# Patient Record
Sex: MALE | Race: BLACK OR AFRICAN AMERICAN | Employment: FULL TIME | URBAN - METROPOLITAN AREA
[De-identification: names, ages, dates, MRNs, and addresses within clinical notes are randomized per-mention and may not be internally consistent; named-entity substitution may affect disease eponyms.]

---

## 2019-08-05 ENCOUNTER — HOSPITAL ENCOUNTER (OUTPATIENT)
Dept: ULTRASOUND IMAGING | Age: 64
Discharge: HOME | End: 2019-08-05
Attending: FAMILY MEDICINE
Payer: COMMERCIAL

## 2019-08-05 DIAGNOSIS — R59.1 LYMPHADENOPATHY: ICD-10-CM

## 2019-08-05 PROCEDURE — 76536 US EXAM OF HEAD AND NECK: CPT

## 2019-08-28 ENCOUNTER — HOSPITAL ENCOUNTER (OUTPATIENT)
Dept: CT IMAGING | Age: 64
Discharge: HOME | End: 2019-08-28
Attending: FAMILY MEDICINE
Payer: COMMERCIAL

## 2019-08-28 DIAGNOSIS — R59.1 LYMPHADENOPATHY: ICD-10-CM

## 2019-08-28 PROCEDURE — 70491 CT SOFT TISSUE NECK W/DYE: CPT

## 2019-08-28 PROCEDURE — 74011250636 HC RX REV CODE- 250/636: Performed by: RADIOLOGY

## 2019-08-28 PROCEDURE — 74011636320 HC RX REV CODE- 636/320: Performed by: RADIOLOGY

## 2019-08-28 RX ORDER — SODIUM CHLORIDE 9 MG/ML
10 INJECTION INTRAMUSCULAR; INTRAVENOUS; SUBCUTANEOUS ONCE
Status: COMPLETED | OUTPATIENT
Start: 2019-08-28 | End: 2019-08-28

## 2019-08-28 RX ORDER — SODIUM CHLORIDE 9 MG/ML
50 INJECTION, SOLUTION INTRAVENOUS
Status: COMPLETED | OUTPATIENT
Start: 2019-08-28 | End: 2019-08-28

## 2019-08-28 RX ADMIN — SODIUM CHLORIDE 10 ML: 9 INJECTION INTRAMUSCULAR; INTRAVENOUS; SUBCUTANEOUS at 19:38

## 2019-08-28 RX ADMIN — SODIUM CHLORIDE 50 ML/HR: 900 INJECTION, SOLUTION INTRAVENOUS at 19:37

## 2019-08-28 RX ADMIN — IOPAMIDOL 100 ML: 612 INJECTION, SOLUTION INTRAVENOUS at 19:38

## 2019-09-10 ENCOUNTER — HOSPITAL ENCOUNTER (OUTPATIENT)
Dept: LAB | Age: 64
Discharge: HOME | End: 2019-09-10

## 2019-09-25 ENCOUNTER — DOCUMENTATION ONLY (OUTPATIENT)
Dept: ONCOLOGY | Age: 64
End: 2019-09-25

## 2019-09-25 ENCOUNTER — OFFICE VISIT (OUTPATIENT)
Dept: ONCOLOGY | Age: 64
End: 2019-09-25

## 2019-09-25 VITALS
WEIGHT: 155.6 LBS | HEIGHT: 69 IN | OXYGEN SATURATION: 99 % | DIASTOLIC BLOOD PRESSURE: 75 MMHG | HEART RATE: 81 BPM | SYSTOLIC BLOOD PRESSURE: 115 MMHG | BODY MASS INDEX: 23.05 KG/M2 | TEMPERATURE: 98.3 F

## 2019-09-25 DIAGNOSIS — C09.9 TONSIL CANCER (HCC): Primary | ICD-10-CM

## 2019-09-25 DIAGNOSIS — F17.200 SMOKING: ICD-10-CM

## 2019-09-25 RX ORDER — HYDROCHLOROTHIAZIDE 12.5 MG/1
12.5 TABLET ORAL DAILY
COMMUNITY

## 2019-09-25 RX ORDER — ATORVASTATIN CALCIUM 20 MG/1
TABLET, FILM COATED ORAL DAILY
COMMUNITY

## 2019-09-25 RX ORDER — OMEPRAZOLE 20 MG/1
20 CAPSULE, DELAYED RELEASE ORAL DAILY
COMMUNITY
End: 2019-12-04

## 2019-09-25 RX ORDER — BISMUTH SUBSALICYLATE 262 MG
1 TABLET,CHEWABLE ORAL DAILY
COMMUNITY

## 2019-09-25 NOTE — PROGRESS NOTES
[facility] [place] at [place] [address] [address], [address]: [phone]  F: [phone]    Reason for Visit:   Escobar Perez is a 60 y.o. male who is seen in consultation at the request of Dr. 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 Size is measuring up to 2.5 cm in short axis. Right internal jugular vein is nearly fully compressed, no contralateral adenopathy seen. · 2019-ENT exam showed very inflamed tonsil on the right side with right Epley and surface, reportedly invading the pharyngeal wall-biopsy of the right tonsil showed invasive nonkeratinizing squamous cell carcinoma moderately differentiated      History of Present Illness:   Patient is a 60 y.o. male seen for SCC of the R tonsil    3 months ago he noted a small R neck swelling. Saw Dr. Carlie Vogt. Tried antibiotics for 10 days. Returned 19 and had an USG that showed adenopathy and then referred to Dr. Deborah Marion. This led to above mentioned diagnosis. He has no fevers, chills, sweats, no dysphagia, has no bleeding, discharge, he has R neck pain and ear pain. He has no hearing impairment. He has no neuropathy. He has SOB. He has no rashes. No weight changes     He walks 5-6 miles a day without SOB  He quit smoking 10 years ago, had smoked 1 ppd for 28 years    Sister had stage IV endometrial cancer in her 46s  Another sister  of ovarian cancer in her 62s     [address] [facility] and [address] [facility] reviewed above       Reviewed under HPI      Reviewed under HPI       Current Outpatient Medications   Medication Sig    hydroCHLOROthiazide (HYDRODIURIL) 12.5 mg tablet Take 12.5 mg by mouth daily.  omeprazole (PRILOSEC) 20 mg capsule Take 20 mg by mouth daily.  atorvastatin (LIPITOR) 20 mg tablet Take  by mouth daily.  multivitamin (ONE A DAY) tablet Take 1 Tab by mouth daily. No current facility-administered medications for this visit. Allergies not on file     Review of Systems: A complete review of systems was obtained, negative except as described above. Physical Exam:     Visit Vitals  /75   Pulse 81   Temp 98.3 °F (36.8 °C)   Ht 5' 9\" (1.753 m)   Wt 155 lb 9.6 oz (70.6 kg)   SpO2 99%   BMI 22.98 kg/m²     ECOG PS: 1  General: No distress  Eyes: PERRLA, anicteric sclerae  HENT: R tonsil enlarged with irregular contour, with erythema, exudates, cant appreciate the posterior pharyngeal wall., palpable Left cervical nodes  Neck: Supple  Lymphatic: No cervical, supraclavicular, or inguinal adenopathy  Respiratory: CTAB, normal respiratory effort  CV: Normal rate, regular rhythm, no murmurs, no peripheral edema  GI: Soft, nontender, nondistended, no masses, no hepatomegaly, no splenomegaly  MS: Normal gait and station. Digits without clubbing or cyanosis. Skin: No rashes, ecchymoses, or petechiae. Normal temperature, turgor, and texture. Psych: Alert, oriented, appropriate affect, normal judgment/insight    Results:   No results found for: WBC, HGB, HCT, PLT, MCV, ANEU, HGBPOC, HCTPOC, HGBEXT, HCTEXT, PLTEXT  No results found for: NA, K, CL, CO2, GLU, BUN, CREA, GFRAA, GFRNA, CA, NAPOC, KPOCT, CLPOC, GLUCPOC, IBUN, CREAPOC, ICAI  No results found for: TBILI, ALT, SGOT, AP, TP, ALB, GLOB  Records reviewed and summarized above. Pathology report(s) reviewed above. Radiology report(s) reviewed above.       Assessment:   1) R tonsil SCC    CT neck and ENT exam reports reviewed  Per discussion with Dr. Devora Rich the tonsillar mass may be involving the pharyngeal wall  CT suggests multiple ipsilateral nodes , atleast one ~ cm and none > 6 cm    Hence clinically at least T3N1- though PET is necessary to accurrately stage as is HPV status  He does have a h/o smoking for 35 years    If HPV positive atleast stage II  If HPV negative atleast stage III    Surgery not recommended by ENT due to disease extent and likelihood of poor functional outcomes    Regardless of HPV status he is likely to need definitive ChemoRT  He has an otherwise excellent PS       [facility] meta analysis: confirmed that regimens that included both chemotherapy and RT significantly decreased the risk of death in 5872 patients with oropharyngeal carcinoma compared with RT alone (hazard ratio [HR] 0.88, 95% CI 0.82-0.93). This corresponded to an absolute improvement of 5 percent in overall survival at five years and higher for concurrent chemoradiotherapy. Standard of care in those with excellent health is Cis 100 mg/m2 Q 3 weeks for 2-3 cycles and 70 Gy in 35 fractions. We discussed the chemotherapy regimen, it's logistics, and potential toxicities in detail. Potential side effects include, but are not limited to, nausea, vomiting, diarrhea, taste changes, myelosuppression, infection, fatigue, allergic reactions, rash, edema, neuropathy, and rarely, death. The patient asked several well thought out questions which I answered to the best of my ability and to their apparent satisfaction. The patient has given consent for chemotherapy. He meets with Dr. Mariah Roe Friday and I will see him right after his PET to finalize plan  He understands the need for port and PEG placement    2) H/O Smoking  Quit  PET    3)  Fh OF Cancers  Should consider genetic counseling    4) Psychosocial  Coping well  Met with SW    Plan:     · PET CT Monday  · See Dr. Mariah Roe as scheduled  · Review in tumor board  · P16 added to path  · RTC Tuesday Oct 1    > 50% of this 60 min encounter spent in counseling and care co ordination    I appreciate the opportunity to participate in Susanna Antonietta Sutton forrest.     Signed By: Iam Saxena MD

## 2019-09-25 NOTE — PROGRESS NOTES
This note will not be viewable in [address] Oncology Navigator  Psychosocial Assessment    Reason for Assessment:    []Depression  []Anxiety  []Caregiver [place]  []Maladaptive Coping with Serious Illness   [] Social Work Referral [x] Initial Assessment  [] Other     Sources of Information:    [x]Patient  [x]Family  []Staff  []Medical Record    Advance Care Planning:  No flowsheet data found. Patient does not have an advanced medical directive, and did not express interest in completing one today.     Mental Status:    [x]Alert  []Lethargic  []Unresponsive   [] Unable to assess   Oriented to:  [x]Person  [x]Place  [x]Time  [x]Situation      Barriers to Learning:    []Language  []Developmental  []Cognitive  []Altered Mental Status  []Visual/Hearing Impairment  []Unable to Read/Write  []Motivational   [x]No Barriers Identified  []Other:    Relationship Status:  []Single  [x]  []Significant Other/Life Partner  []  []  []      Living Circumstances:  []Lives Alone  [x]Family/Significant Other in Household  []Roommates  []Children in the Home  []Paid Caregivers  []Assisted Living Facility/Group Home  []Skilled [address]  []Homeless  []Incarcerated  []Environmental/Care Concerns  []other:    Employment Status:  [x]Employed Full-time []Employed Part-time []Homemaker [] Disabled  [] Retired []Other:    Support System:    [x]Strong  []Fair  []Limited    Financial/Legal Concerns:    []Uninsured  []Limited Income/Resources  []Non-Citizen  [x]No Concerns Identified  []Financial POA:    []Other:    Gnosticist/Spiritual/Existential:  []Strong Sense of Spirituality  []Involved in [employer]  []Request  Visit  []Expressing Cristofer Wallace  [x]No Concerns Identified    Coping with Illness:         Patient: Family/Caregiver:   Understanding and Acceptance of Illness/Prognosis  [x] [x]   Strong Sense of Resilience [x] [x]   Self Reflection [x] [x]   Engaged Support System [x] [x]   Does not Readily Discuss Illness [] []   Denial of Terminal Status [] []   Anger [] []   Depression [] []   Anxiety/Fear [] []   Bargaining [] []   Recent Diagnosis/Prognosis [] []   Difficulties with Body Image [] []   Loss of Identity [] []   Excessive Substance Use [] []   Mental Health History [] []   Enmeshed Relationships [] []   History of Loss [] []   Anticipatory Grief [] []   Concern for Complicated Grief [] []   Suicidal Ideation or Plan [] []   Unable to assess [] []            Narrative:  Met with the patient during his initial office visit along with his siblings, Tova Alvarez, and Hermes Tejeda. 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 He plans to meet with a radiation oncologist, and get a PET scan to help determine a treatment plan soon. The patient lives with his wife. He has 3 grown children who are supportive. The patient is employed at the [employer] in [place], [place]. He also has a \"football team\" of siblings, counting 13. He explained that he is from [facility] [place]. His sister China Myers) lives in [place]. His brother Britt Escobar), present today, is visiting from [place]. Hermes Tejeda has just been diagnosed with a high PSA, and will be seeing an MD tomorrow to determine if he has prostate cancer. Tova  has metastatic breast cancer. They have sister who just  from ovarian cancer. He plans to get genetic testing soon. Discussed [facility] as a back up to family providing transportation. Provided this 's contact information as well as information regarding the complementary services provided by the [facility]. The patient provided FMLA and short term disability paperwork to be completed by our office once a treatment plan is determined; this was given to colleague Liane Elliott RN. Assessment/Action:   1.   Introduced self and role of this  in the [facility]. 2.  Informed the patient of the [employer] and available resources there. 3.  Continue to meet with the patient when he returns to the clinic for ongoing assessment of the patients adjustment to his diagnosis and treatment. 4.  Ongoing psychosocial support as desired by patient.       Plan/Referral:     Transportation referral (Discussed [facility] as a back up to family providing transportation)   Complementary therapies referral  Insurance/Entitlements referral (Patient provided FMLA and short term disability paperwork to be completed by our office once a treatment plan is determined)    Amy Berman LCSW

## 2019-09-27 ENCOUNTER — HOSPITAL ENCOUNTER (OUTPATIENT)
Dept: RADIATION THERAPY | Age: 64
Discharge: HOME | End: 2019-09-27

## 2019-09-30 ENCOUNTER — HOSPITAL ENCOUNTER (OUTPATIENT)
Dept: PET IMAGING | Age: 64
Discharge: HOME | End: 2019-09-30
Attending: OTOLARYNGOLOGY
Payer: COMMERCIAL

## 2019-09-30 VITALS — BODY MASS INDEX: 22.81 KG/M2 | HEIGHT: 69 IN | WEIGHT: 154 LBS

## 2019-09-30 DIAGNOSIS — C09.9 SQUAMOUS CELL CARCINOMA OF TONSILS (HCC): ICD-10-CM

## 2019-09-30 PROCEDURE — A9552 F18 FDG: HCPCS

## 2019-09-30 RX ORDER — SODIUM CHLORIDE 0.9 % (FLUSH) 0.9 %
10 SYRINGE (ML) INJECTION
Status: COMPLETED | OUTPATIENT
Start: 2019-09-30 | End: 2019-09-30

## 2019-09-30 RX ADMIN — Medication 10 ML: at 07:10

## 2019-10-01 ENCOUNTER — DOCUMENTATION ONLY (OUTPATIENT)
Dept: ONCOLOGY | Age: 64
End: 2019-10-01

## 2019-10-01 ENCOUNTER — OFFICE VISIT (OUTPATIENT)
Dept: ONCOLOGY | Age: 64
End: 2019-10-01

## 2019-10-01 VITALS
OXYGEN SATURATION: 98 % | TEMPERATURE: 99.6 F | HEART RATE: 84 BPM | RESPIRATION RATE: 17 BRPM | SYSTOLIC BLOOD PRESSURE: 135 MMHG | WEIGHT: 155.2 LBS | DIASTOLIC BLOOD PRESSURE: 79 MMHG | HEIGHT: 69 IN | BODY MASS INDEX: 22.99 KG/M2

## 2019-10-01 DIAGNOSIS — C09.9 TONSIL CANCER (HCC): Primary | ICD-10-CM

## 2019-10-01 DIAGNOSIS — F17.200 SMOKING: ICD-10-CM

## 2019-10-01 RX ORDER — DIPHENHYDRAMINE HYDROCHLORIDE 50 MG/ML
50 INJECTION, SOLUTION INTRAMUSCULAR; INTRAVENOUS AS NEEDED
Status: CANCELLED
Start: 2019-11-06

## 2019-10-01 RX ORDER — HEPARIN 100 UNIT/ML
300-500 SYRINGE INTRAVENOUS AS NEEDED
Status: CANCELLED
Start: 2019-11-06

## 2019-10-01 RX ORDER — ACETAMINOPHEN 325 MG/1
650 TABLET ORAL AS NEEDED
Status: CANCELLED
Start: 2019-12-18

## 2019-10-01 RX ORDER — HYDROCORTISONE SODIUM SUCCINATE 100 MG/2ML
100 INJECTION, POWDER, FOR SOLUTION INTRAMUSCULAR; INTRAVENOUS AS NEEDED
Status: CANCELLED | OUTPATIENT
Start: 2019-11-27

## 2019-10-01 RX ORDER — HYDROCORTISONE SODIUM SUCCINATE 100 MG/2ML
100 INJECTION, POWDER, FOR SOLUTION INTRAMUSCULAR; INTRAVENOUS AS NEEDED
Status: CANCELLED | OUTPATIENT
Start: 2019-11-06

## 2019-10-01 RX ORDER — SODIUM CHLORIDE 9 MG/ML
25 INJECTION, SOLUTION INTRAVENOUS CONTINUOUS
Status: CANCELLED | OUTPATIENT
Start: 2019-11-27

## 2019-10-01 RX ORDER — DIPHENHYDRAMINE HYDROCHLORIDE 50 MG/ML
50 INJECTION, SOLUTION INTRAMUSCULAR; INTRAVENOUS AS NEEDED
Status: CANCELLED
Start: 2019-11-27

## 2019-10-01 RX ORDER — SODIUM CHLORIDE 0.9 % (FLUSH) 0.9 %
10 SYRINGE (ML) INJECTION AS NEEDED
Status: CANCELLED
Start: 2019-11-06

## 2019-10-01 RX ORDER — SODIUM CHLORIDE 9 MG/ML
25 INJECTION, SOLUTION INTRAVENOUS CONTINUOUS
Status: CANCELLED | OUTPATIENT
Start: 2019-11-06

## 2019-10-01 RX ORDER — SODIUM CHLORIDE 9 MG/ML
25 INJECTION, SOLUTION INTRAVENOUS CONTINUOUS
Status: CANCELLED | OUTPATIENT
Start: 2019-12-18

## 2019-10-01 RX ORDER — ALBUTEROL SULFATE 0.83 MG/ML
2.5 SOLUTION RESPIRATORY (INHALATION) AS NEEDED
Status: CANCELLED
Start: 2019-11-06

## 2019-10-01 RX ORDER — ONDANSETRON 2 MG/ML
8 INJECTION INTRAMUSCULAR; INTRAVENOUS AS NEEDED
Status: CANCELLED | OUTPATIENT
Start: 2019-11-27

## 2019-10-01 RX ORDER — ONDANSETRON 2 MG/ML
8 INJECTION INTRAMUSCULAR; INTRAVENOUS ONCE
Status: CANCELLED | OUTPATIENT
Start: 2019-12-18

## 2019-10-01 RX ORDER — DIPHENHYDRAMINE HYDROCHLORIDE 50 MG/ML
50 INJECTION, SOLUTION INTRAMUSCULAR; INTRAVENOUS AS NEEDED
Status: CANCELLED
Start: 2019-12-18

## 2019-10-01 RX ORDER — SODIUM CHLORIDE 9 MG/ML
10 INJECTION INTRAMUSCULAR; INTRAVENOUS; SUBCUTANEOUS AS NEEDED
Status: CANCELLED | OUTPATIENT
Start: 2019-12-18

## 2019-10-01 RX ORDER — HEPARIN 100 UNIT/ML
300-500 SYRINGE INTRAVENOUS AS NEEDED
Status: CANCELLED
Start: 2019-12-18

## 2019-10-01 RX ORDER — ONDANSETRON 2 MG/ML
8 INJECTION INTRAMUSCULAR; INTRAVENOUS AS NEEDED
Status: CANCELLED | OUTPATIENT
Start: 2019-12-18

## 2019-10-01 RX ORDER — ONDANSETRON 2 MG/ML
8 INJECTION INTRAMUSCULAR; INTRAVENOUS AS NEEDED
Status: CANCELLED | OUTPATIENT
Start: 2019-11-06

## 2019-10-01 RX ORDER — ALBUTEROL SULFATE 0.83 MG/ML
2.5 SOLUTION RESPIRATORY (INHALATION) AS NEEDED
Status: CANCELLED
Start: 2019-12-18

## 2019-10-01 RX ORDER — HYDROCORTISONE SODIUM SUCCINATE 100 MG/2ML
100 INJECTION, POWDER, FOR SOLUTION INTRAMUSCULAR; INTRAVENOUS AS NEEDED
Status: CANCELLED | OUTPATIENT
Start: 2019-12-18

## 2019-10-01 RX ORDER — ONDANSETRON 2 MG/ML
8 INJECTION INTRAMUSCULAR; INTRAVENOUS ONCE
Status: CANCELLED | OUTPATIENT
Start: 2019-11-27

## 2019-10-01 RX ORDER — EPINEPHRINE 1 MG/ML
0.3 INJECTION, SOLUTION, CONCENTRATE INTRAVENOUS AS NEEDED
Status: CANCELLED | OUTPATIENT
Start: 2019-11-06

## 2019-10-01 RX ORDER — SODIUM CHLORIDE 9 MG/ML
10 INJECTION INTRAMUSCULAR; INTRAVENOUS; SUBCUTANEOUS AS NEEDED
Status: CANCELLED | OUTPATIENT
Start: 2019-11-06

## 2019-10-01 RX ORDER — EPINEPHRINE 1 MG/ML
0.3 INJECTION, SOLUTION, CONCENTRATE INTRAVENOUS AS NEEDED
Status: CANCELLED | OUTPATIENT
Start: 2019-12-18

## 2019-10-01 RX ORDER — ONDANSETRON 2 MG/ML
8 INJECTION INTRAMUSCULAR; INTRAVENOUS ONCE
Status: CANCELLED | OUTPATIENT
Start: 2019-11-06

## 2019-10-01 RX ORDER — ALBUTEROL SULFATE 0.83 MG/ML
2.5 SOLUTION RESPIRATORY (INHALATION) AS NEEDED
Status: CANCELLED
Start: 2019-11-27

## 2019-10-01 RX ORDER — ACETAMINOPHEN 325 MG/1
650 TABLET ORAL AS NEEDED
Status: CANCELLED
Start: 2019-11-27

## 2019-10-01 RX ORDER — SODIUM CHLORIDE 0.9 % (FLUSH) 0.9 %
10 SYRINGE (ML) INJECTION AS NEEDED
Status: CANCELLED
Start: 2019-12-18

## 2019-10-01 RX ORDER — HEPARIN 100 UNIT/ML
300-500 SYRINGE INTRAVENOUS AS NEEDED
Status: CANCELLED
Start: 2019-11-27

## 2019-10-01 RX ORDER — ACETAMINOPHEN 325 MG/1
650 TABLET ORAL AS NEEDED
Status: CANCELLED
Start: 2019-11-06

## 2019-10-01 RX ORDER — SODIUM CHLORIDE 0.9 % (FLUSH) 0.9 %
10 SYRINGE (ML) INJECTION AS NEEDED
Status: CANCELLED
Start: 2019-11-27

## 2019-10-01 RX ORDER — SODIUM CHLORIDE 9 MG/ML
10 INJECTION INTRAMUSCULAR; INTRAVENOUS; SUBCUTANEOUS AS NEEDED
Status: CANCELLED | OUTPATIENT
Start: 2019-11-27

## 2019-10-01 RX ORDER — EPINEPHRINE 1 MG/ML
0.3 INJECTION, SOLUTION, CONCENTRATE INTRAVENOUS AS NEEDED
Status: CANCELLED | OUTPATIENT
Start: 2019-11-27

## 2019-10-01 NOTE — PROGRESS NOTES
Dick Rodriguez is a 60 y.o. male  Chief Complaint   Patient presents with    Follow-up     tonsil cancer     1. Have you been to the ER, urgent care clinic since your last visit? Hospitalized since your last visit? No    2. Have you seen or consulted any other health care providers outside of the [address] [place] [address] since your last visit? Include any pap smears or colon screening.  No

## 2019-10-01 NOTE — PROGRESS NOTES
Pharmacy Note- Chemotherapy Education    Wilda Cisneros is a  60 y.o.male  diagnosed with Head and Neck Cancer here today for  chemotherapy counseling and consent. Mr. Danyelle Schreiber is being treated with CISplatin every 3 weeks with radiation. Provided education on CISplatin and premedications - fosaprepitant, ondansetron, dexamethasone. Side effects of chemotherapy reviewed included s/s infection, anemia, appetite changes, thromboyctopenia, fatigue, hair loss/alopecia, bone pain, skin and nail changes, diarrhea/constipation, peripheral neuropathy, electrolyte disturbances, kidney changes, and hearing changes. Patient given ways to manage these side effects and when to contact office. [address] Handout of medications provided to patient. Mr. Danyelle Schreiber verbalized understanding of the information presented and all of the patient's questions were answered.     Addison Harrison, PharmD, BCPS, BCOP

## 2019-10-01 NOTE — PROGRESS NOTES
[facility] [place] at [place] [address] [address], [address]  W: [phone]  F: [phone]    Reason for Visit:   Lalo Guan is a 60 y.o. 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 Size is measuring up to 2.5 cm in short axis. Right internal jugular vein is nearly fully compressed, no contralateral adenopathy seen. · 2019-ENT exam showed very inflamed tonsil on the right side with right Epley and surface, reportedly invading the pharyngeal wall-biopsy of the right tonsil showed invasive nonkeratinizing squamous cell carcinoma moderately differentiated  · 2019: PET CT Right tonsillar mass with hypermetabolic right cervical lymphadenopathy    History of Present Illness:   Patient is a 60 y.o. male seen for SCC of the R tonsil    3 months ago he noted a small R neck swelling. Saw Dr. Kinsey Gómez. Tried antibiotics for 10 days. Returned 19 and had an USG that showed adenopathy and then referred to Dr. Jose Noonan. This led to above mentioned diagnosis. Comes after a PET CT and seeing Rad Onc    He has no fevers, chills, sweats, no dysphagia, has no bleeding, discharge, he has R neck pain and ear pain. He has no hearing impairment. He has no neuropathy. He has SOB. He has no rashes. No weight changes     He walks 5-6 miles a day without SOB  He quit smoking 10 years ago, had smoked 1 ppd for 28 years    Sister had stage IV endometrial cancer in her 46s  Another sister  of ovarian cancer in her 62s     [address] and [address] [facility] reviewed above       Reviewed under HPI    FH  Reviewed under HPI       Current Outpatient Medications   Medication Sig    hydroCHLOROthiazide (HYDRODIURIL) 12.5 mg tablet Take 12.5 mg by mouth daily.  omeprazole (PRILOSEC) 20 mg capsule Take 20 mg by mouth daily.     atorvastatin (LIPITOR) 20 mg tablet Take  by mouth daily.  multivitamin (ONE A DAY) tablet Take 1 Tab by mouth daily. No current facility-administered medications for this visit. Not on File     Review of Systems: A complete review of systems was obtained, negative except as described above. Physical Exam:     Visit Vitals  /79   Pulse 84   Temp 99.6 °F (37.6 °C) (Oral)   Resp 17   Ht 5' 9\" (1.753 m)   Wt 155 lb 3.2 oz (70.4 kg)   SpO2 98%   BMI 22.92 kg/m²     ECOG PS: 1  General: No distress  Eyes: PERRLA, anicteric sclerae  HENT: R tonsil enlarged with irregular contour, with erythema, exudates, cant appreciate the posterior pharyngeal wall., palpable Left cervical nodes  Neck: Supple  Lymphatic: No cervical, supraclavicular, or inguinal adenopathy  Respiratory: CTAB, normal respiratory effort  CV: Normal rate, regular rhythm, no murmurs, no peripheral edema  GI: Soft, nontender, nondistended, no masses, no hepatomegaly, no splenomegaly  MS: Normal gait and station. Digits without clubbing or cyanosis. Skin: No rashes, ecchymoses, or petechiae. Normal temperature, turgor, and texture. Psych: Alert, oriented, appropriate affect, normal judgment/insight    Results:   No results found for: WBC, HGB, HCT, PLT, MCV, ANEU, HGBPOC, HCTPOC, HGBEXT, HCTEXT, PLTEXT, HGBEXT, HCTEXT, PLTEXT  No results found for: NA, K, CL, CO2, GLU, BUN, CREA, GFRAA, GFRNA, CA, NAPOC, KPOCT, CLPOC, GLUCPOC, IBUN, CREAPOC, ICAI  No results found for: TBILI, ALT, SGOT, AP, TP, ALB, GLOB  Records reviewed and summarized above. Pathology report(s) reviewed above. Radiology report(s) reviewed above.       Assessment:   1) R tonsil SCC    CT neck and ENT exam reports reviewed  Per discussion with Dr. Jackie Mckinley the tonsillar mass may be involving the pharyngeal wall  CT suggests multiple ipsilateral nodes , atleast one ~3  cm and none > 6 cm  PET CT shows no distant mets  HPV positive    Clinically at least T2N1- Though could be T3N1 - HPV postive- stage I-II  He does have a h/o smoking for 35 years    Surgery not recommended by ENT due to disease extent and likelihood of poor functional outcomes    He has an otherwise excellent PS   Has met with Michael Truong and we both agree with definitive Chemo RT    [facility] meta analysis: confirmed that regimens that included both chemotherapy and RT significantly decreased the risk of death in 5872 patients with oropharyngeal carcinoma compared with RT alone (hazard ratio [HR] 0.88, 95% CI 0.82-0.93). This corresponded to an absolute improvement of 5 percent in overall survival at five years and higher for concurrent chemoradiotherapy. Standard of care in those with excellent health is Cis 100 mg/m2 Q 3 weeks for 2-3 cycles and 70 Gy in 35 fractions. We discussed the chemotherapy regimen, it's logistics, and potential toxicities in detail. Potential side effects include, but are not limited to, nausea, vomiting, diarrhea, taste changes, myelosuppression, infection, fatigue, allergic reactions, rash, edema, neuropathy, and rarely, death. The patient asked several well thought out questions which I answered to the best of my ability and to their apparent satisfaction. The patient has given consent for chemotherapy. He understands the need for port and PEG placement    2) H/O Smoking  Quit  PET    3)  Fh OF Cancers  Should consider genetic counseling    4) Risk of dysphagia and dehydration  PEG placement and RD consult    5) Psychosocial  Coping well  Met with SW  Son, wife and brother here today    Plan:     · Port and PEG  · RD Consult  · Cisplatin 100 mg/m² q21 Days x 3 Cycles with Concurrent Radiation  · Emend to premeds  · Call in day 2, 3 dexamethasone and zofran prn on day 1 cycle 1  · Weekly OPIC labs and hydration    RTC C1D1    > 50% of this 40 min encounter spent in counseling and care co ordination    I appreciate the opportunity to participate in Mr. Ton clayton.     Signed By: Serg Zheng MD

## 2019-10-02 ENCOUNTER — TELEPHONE (OUTPATIENT)
Dept: ONCOLOGY | Age: 64
End: 2019-10-02

## 2019-10-04 ENCOUNTER — TELEPHONE (OUTPATIENT)
Dept: ONCOLOGY | Age: 64
End: 2019-10-04

## 2019-10-04 NOTE — TELEPHONE ENCOUNTER
Dentist  Dr. Raymundo Juan called and stated that she would like to talk to Dr. Ambar Carrasquillo about patients treatment plan.  # [phone]

## 2019-10-07 ENCOUNTER — TELEPHONE (OUTPATIENT)
Dept: ONCOLOGY | Age: 64
End: 2019-10-07

## 2019-10-07 NOTE — TELEPHONE ENCOUNTER
Cancer [place] at [address], [address] [facility] [address]  [address]: [phone]  F: [phone]    Medical Nutrition Therapy    Nutrition Referral:    Called and left a message. Contact information provided.       Signed By: Hermes Gandhi, [address], [address] [place] , [address] [facility]

## 2019-10-10 ENCOUNTER — HOSPITAL ENCOUNTER (OUTPATIENT)
Dept: INTERVENTIONAL RADIOLOGY/VASCULAR | Age: 64
Discharge: HOME | End: 2019-10-10
Attending: RADIOLOGY | Admitting: RADIOLOGY
Payer: COMMERCIAL

## 2019-10-10 ENCOUNTER — HOSPITAL ENCOUNTER (OUTPATIENT)
Dept: INTERVENTIONAL RADIOLOGY/VASCULAR | Age: 64
Discharge: HOME | End: 2019-10-10
Attending: INTERNAL MEDICINE
Payer: COMMERCIAL

## 2019-10-10 ENCOUNTER — DOCUMENTATION ONLY (OUTPATIENT)
Dept: ONCOLOGY | Age: 64
End: 2019-10-10

## 2019-10-10 VITALS
DIASTOLIC BLOOD PRESSURE: 73 MMHG | RESPIRATION RATE: 15 BRPM | OXYGEN SATURATION: 97 % | HEIGHT: 69 IN | WEIGHT: 154 LBS | TEMPERATURE: 98.4 F | SYSTOLIC BLOOD PRESSURE: 137 MMHG | BODY MASS INDEX: 22.81 KG/M2 | HEART RATE: 79 BPM

## 2019-10-10 DIAGNOSIS — C09.9 TONSIL CANCER (HCC): ICD-10-CM

## 2019-10-10 PROCEDURE — 77030011229 HC DIL VESL COON COOK -A

## 2019-10-10 PROCEDURE — 36561 INSERT TUNNELED CV CATH: CPT

## 2019-10-10 PROCEDURE — 74011250636 HC RX REV CODE- 250/636: Performed by: RADIOLOGY

## 2019-10-10 PROCEDURE — C1769 GUIDE WIRE: HCPCS

## 2019-10-10 PROCEDURE — 77030011893 HC TY CUT DN TRIS -B

## 2019-10-10 PROCEDURE — 77030004561 HC CATH ANGI DX COBRA ANGI -B

## 2019-10-10 PROCEDURE — 74011636320 HC RX REV CODE- 636/320: Performed by: RADIOLOGY

## 2019-10-10 PROCEDURE — C1894 INTRO/SHEATH, NON-LASER: HCPCS

## 2019-10-10 PROCEDURE — 74011000250 HC RX REV CODE- 250: Performed by: RADIOLOGY

## 2019-10-10 PROCEDURE — 77030031139 HC SUT VCRL2 J&J -A

## 2019-10-10 PROCEDURE — 99153 MOD SED SAME PHYS/QHP EA: CPT

## 2019-10-10 PROCEDURE — 77030018617 HC TU FEED GASTMY1 COOK -B

## 2019-10-10 PROCEDURE — 77030039266 HC ADH SKN EXOFIN S2SG -A

## 2019-10-10 RX ORDER — FENTANYL CITRATE 50 UG/ML
25 INJECTION, SOLUTION INTRAMUSCULAR; INTRAVENOUS
Status: DISCONTINUED | OUTPATIENT
Start: 2019-10-10 | End: 2019-10-10 | Stop reason: HOSPADM

## 2019-10-10 RX ORDER — MIDAZOLAM HYDROCHLORIDE 1 MG/ML
5 INJECTION, SOLUTION INTRAMUSCULAR; INTRAVENOUS
Status: DISCONTINUED | OUTPATIENT
Start: 2019-10-10 | End: 2019-10-10 | Stop reason: HOSPADM

## 2019-10-10 RX ORDER — LIDOCAINE HYDROCHLORIDE 20 MG/ML
JELLY TOPICAL AS NEEDED
Status: DISCONTINUED | OUTPATIENT
Start: 2019-10-10 | End: 2019-10-14 | Stop reason: HOSPADM

## 2019-10-10 RX ORDER — LIDOCAINE HYDROCHLORIDE AND EPINEPHRINE 10; 10 MG/ML; UG/ML
30 INJECTION, SOLUTION INFILTRATION; PERINEURAL ONCE
Status: COMPLETED | OUTPATIENT
Start: 2019-10-10 | End: 2019-10-10

## 2019-10-10 RX ORDER — LIDOCAINE HYDROCHLORIDE 20 MG/ML
20 INJECTION, SOLUTION INFILTRATION; PERINEURAL ONCE
Status: DISCONTINUED | OUTPATIENT
Start: 2019-10-10 | End: 2019-10-10

## 2019-10-10 RX ORDER — CEFAZOLIN SODIUM/WATER 2 G/20 ML
2 SYRINGE (ML) INTRAVENOUS
Status: COMPLETED | OUTPATIENT
Start: 2019-10-10 | End: 2019-10-10

## 2019-10-10 RX ORDER — LIDOCAINE HYDROCHLORIDE 10 MG/ML
10 INJECTION, SOLUTION EPIDURAL; INFILTRATION; INTRACAUDAL; PERINEURAL ONCE
Status: COMPLETED | OUTPATIENT
Start: 2019-10-10 | End: 2019-10-10

## 2019-10-10 RX ORDER — SODIUM CHLORIDE 9 MG/ML
25 INJECTION, SOLUTION INTRAVENOUS ONCE
Status: COMPLETED | OUTPATIENT
Start: 2019-10-10 | End: 2019-10-10

## 2019-10-10 RX ORDER — HYDROCODONE BITARTRATE AND ACETAMINOPHEN 5; 325 MG/1; MG/1
1 TABLET ORAL
COMMUNITY
End: 2019-11-25

## 2019-10-10 RX ORDER — HEPARIN 100 UNIT/ML
300 SYRINGE INTRAVENOUS AS NEEDED
Status: DISCONTINUED | OUTPATIENT
Start: 2019-10-10 | End: 2019-10-14 | Stop reason: HOSPADM

## 2019-10-10 RX ADMIN — MIDAZOLAM 1 MG: 1 INJECTION INTRAMUSCULAR; INTRAVENOUS at 13:10

## 2019-10-10 RX ADMIN — MIDAZOLAM 1 MG: 1 INJECTION INTRAMUSCULAR; INTRAVENOUS at 12:55

## 2019-10-10 RX ADMIN — Medication 2 G: at 12:46

## 2019-10-10 RX ADMIN — SODIUM CHLORIDE 25 ML/HR: 900 INJECTION, SOLUTION INTRAVENOUS at 12:07

## 2019-10-10 RX ADMIN — MIDAZOLAM 1 MG: 1 INJECTION INTRAMUSCULAR; INTRAVENOUS at 13:05

## 2019-10-10 RX ADMIN — IOHEXOL 20 ML: 240 INJECTION, SOLUTION INTRATHECAL; INTRAVASCULAR; INTRAVENOUS; ORAL at 13:40

## 2019-10-10 RX ADMIN — HEPARIN 300 UNITS: 100 SYRINGE at 13:10

## 2019-10-10 RX ADMIN — LIDOCAINE HYDROCHLORIDE 8 ML: 10 INJECTION, SOLUTION EPIDURAL; INFILTRATION; INTRACAUDAL; PERINEURAL at 13:05

## 2019-10-10 RX ADMIN — FENTANYL CITRATE 25 MCG: 50 INJECTION INTRAMUSCULAR; INTRAVENOUS at 13:06

## 2019-10-10 RX ADMIN — MIDAZOLAM 1 MG: 1 INJECTION INTRAMUSCULAR; INTRAVENOUS at 13:00

## 2019-10-10 RX ADMIN — LIDOCAINE HYDROCHLORIDE AND EPINEPHRINE 8 ML: 10; 10 INJECTION, SOLUTION INFILTRATION; PERINEURAL at 13:05

## 2019-10-10 RX ADMIN — FENTANYL CITRATE 25 MCG: 50 INJECTION INTRAMUSCULAR; INTRAVENOUS at 13:19

## 2019-10-10 RX ADMIN — FENTANYL CITRATE 25 MCG: 50 INJECTION INTRAMUSCULAR; INTRAVENOUS at 13:01

## 2019-10-10 RX ADMIN — FENTANYL CITRATE 25 MCG: 50 INJECTION INTRAMUSCULAR; INTRAVENOUS at 12:55

## 2019-10-10 RX ADMIN — MIDAZOLAM 1 MG: 1 INJECTION INTRAMUSCULAR; INTRAVENOUS at 13:22

## 2019-10-10 NOTE — PROGRESS NOTES
Escorted to angio holding prior to port and G-tube placement. Wife at bedside. Dr. Severiano Prince in to evaluate patient and obtain consent. Patient verbalizes understanding of procedure and consent signed by same. Anabel Omer RD at bedside to explain G-tube to patient and wife. Both verbalize understanding,.

## 2019-10-10 NOTE — DISCHARGE INSTRUCTIONS
[facility]  Angiography Department      Radiologist:   Ellen Fischer MD      Date:   10-10-19      [facility] Discharge Instructions      Watch for signs of infection:    1. Redness,   2. Fever, chills,   3. Increased pain, and/or drainage from the site. If this occurs, call your physician at once. If you have an appointment with a provider or at the infusion center in the upcoming week,  they may check your site. Continue your previous diet and restart your regularly prescribed medications. You may take Tylenol, as directed on the label, for pain if needed. Avoid ibuprofen (Advil, Motrin) and aspirin as they may cause you to bleed. Because you received sedation, you are not to drive or sign any legal documents for the next 24 hours. Do not lift anything heavier than 5 pounds with the affected arm and avoid pushing and pulling movements for several days. Gastrostomy Feeding Tube Placement Discharge Instructions      Go home and rest and restrict your activity the next 24 hours. You have been given sedating medications, so do not drive or drink alcohol for 24 hours. Resume your previous diet and medications. You may take Tylenol, as directed on the label, for pain or discomfort. Avoid Ibuprofen (Advil, Motrin etc.) and Aspirin today as they may increase your risk of bleeding. Your new tube can be used tomorrow, position was verified with fluoroscopy. Keep the dressing around the tube clean and dry. Replace the dressing if it becomes moist or soiled. Watch for any signs of infection. .... Redness, drainage, pus, foul odor or fever. Follow up with your doctor as previously discussed. You have been instructed on care of the feeding tube by Fabiola Maza RD and she has given her your number if you have any questions. WE are here to help!! Kristy Duncan RN  10-10-19      .        Should you experience any of these significant changes, please call [phone] between the hours of 7:30 am and 10 pm or [phone] after hours.  After hours, ask the  to page the [address] Technologist, and describe the problem to the technologist.

## 2019-10-14 ENCOUNTER — TELEPHONE (OUTPATIENT)
Dept: ONCOLOGY | Age: 64
End: 2019-10-14

## 2019-10-14 NOTE — TELEPHONE ENCOUNTER
[facility] [place] [facility]   [address] [facility] [address], [address]  [place]: [phone]  F: [phone]    Medical Nutrition Therapy    Nutrition Referral:    Called and left a message. Called to see how feeding tube placement went and if he had any questions or concerns. Tube placed on 10/10. Contact information provided.       Signed By: Taniya Liriano, [address] [facility] [address], [address] [place] , [address] [facility]

## 2019-10-14 NOTE — PROGRESS NOTES
Cancer [place] at [address]  [address]: [phone]  F: [phone]    Medical Nutrition Therapy      Nutrition Encounter: Met with patient and wife prior to Gtube placement. He had several teeth removed and reports he still needs to 2-3 on the right side removed. He reports he is still able to eat and drink despite dental work. We discussed importance of nutrition during treatment. Reviewed increased energy demands. Discussed side effects that may impact nutrition status. Discussed feeding tube. The following was discussed:   - During feedings and when giving medications; sit upright and remain sitting up at least 30 minutes afterwards. Do not lay down during feeding.   - Formula Storage- keep at room temperature. Once open store in refrigerator and throw away all unused formula after 24 hours. Allow formula to return to room temperature before giving feeds.    - Explained pinching or bending the end of the feeding tube to keep the contents from leaking out. Used a sample feeding tube to visually show. - Discussed and visually showed patient how to give a water flush using the syringe. -Explained that patient is to give at least one water flush to clean the tubing once daily if she is not using the feeding tube. -Discussed giving formula via tube. Suggested to start with ½ can and give slowly using syringe.   -Discussed formulas that can go through the tube. - Explained site care- cleaning area with warm soapy water.       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 Plan:   - Flush tube once daily with water. - Keep tube site clean  - Continue with oral intake as tolerated. I appreciate the opportunity to participate in Susanna Bogdan Vinaykevin forrest.     Signed By: Kathya Rich, 66 [address], [address] [place] [address] , [address]     Contact: [phone]

## 2019-10-24 ENCOUNTER — TELEPHONE (OUTPATIENT)
Dept: ONCOLOGY | Age: 64
End: 2019-10-24

## 2019-10-25 ENCOUNTER — HOSPITAL ENCOUNTER (OUTPATIENT)
Dept: INFUSION THERAPY | Age: 64
End: 2019-10-25

## 2019-10-28 ENCOUNTER — DOCUMENTATION ONLY (OUTPATIENT)
Dept: ONCOLOGY | Age: 64
End: 2019-10-28

## 2019-10-28 NOTE — PROGRESS NOTES
Received phone call from radiation oncology. Patient has CT simulation on Friday and ready or scheduling. Please schedule chemotherapy for pt. Let me know date so I can notify rad onc. Call patient to schedule, sometime next week but not Friday as rad onc does not start patients on Fridays. Needs OV with me same day.

## 2019-10-29 ENCOUNTER — DOCUMENTATION ONLY (OUTPATIENT)
Dept: ONCOLOGY | Age: 64
End: 2019-10-29

## 2019-11-01 ENCOUNTER — APPOINTMENT (OUTPATIENT)
Dept: INFUSION THERAPY | Age: 64
End: 2019-11-01
Payer: COMMERCIAL

## 2019-11-06 ENCOUNTER — HOSPITAL ENCOUNTER (OUTPATIENT)
Dept: INFUSION THERAPY | Age: 64
Discharge: HOME | End: 2019-11-06
Payer: COMMERCIAL

## 2019-11-06 ENCOUNTER — OFFICE VISIT (OUTPATIENT)
Dept: ONCOLOGY | Age: 64
End: 2019-11-06

## 2019-11-06 VITALS
RESPIRATION RATE: 18 BRPM | BODY MASS INDEX: 22.5 KG/M2 | TEMPERATURE: 99 F | HEIGHT: 69 IN | WEIGHT: 151.9 LBS | SYSTOLIC BLOOD PRESSURE: 138 MMHG | HEART RATE: 78 BPM | DIASTOLIC BLOOD PRESSURE: 65 MMHG

## 2019-11-06 VITALS
RESPIRATION RATE: 18 BRPM | WEIGHT: 151.9 LBS | DIASTOLIC BLOOD PRESSURE: 76 MMHG | TEMPERATURE: 100 F | SYSTOLIC BLOOD PRESSURE: 122 MMHG | HEIGHT: 69 IN | HEART RATE: 78 BPM | OXYGEN SATURATION: 98 % | BODY MASS INDEX: 22.5 KG/M2

## 2019-11-06 DIAGNOSIS — C09.9 TONSIL CANCER (HCC): Primary | ICD-10-CM

## 2019-11-06 LAB
ALBUMIN SERPL-MCNC: 3.8 G/DL (ref 3.5–5)
ALBUMIN/GLOB SERPL: 1 {RATIO} (ref 1.1–2.2)
ALP SERPL-CCNC: 100 U/L (ref 45–117)
ALT SERPL-CCNC: 24 U/L (ref 12–78)
ANION GAP SERPL CALC-SCNC: 4 MMOL/L (ref 5–15)
AST SERPL-CCNC: 17 U/L (ref 15–37)
BASOPHILS # BLD: 0 K/UL (ref 0–0.1)
BASOPHILS NFR BLD: 0 % (ref 0–1)
BILIRUB SERPL-MCNC: 0.3 MG/DL (ref 0.2–1)
BUN SERPL-MCNC: 14 MG/DL (ref 6–20)
BUN/CREAT SERPL: 18 (ref 12–20)
CALCIUM SERPL-MCNC: 9.1 MG/DL (ref 8.5–10.1)
CHLORIDE SERPL-SCNC: 110 MMOL/L (ref 97–108)
CO2 SERPL-SCNC: 29 MMOL/L (ref 21–32)
CREAT SERPL-MCNC: 0.79 MG/DL (ref 0.7–1.3)
DIFFERENTIAL METHOD BLD: ABNORMAL
EOSINOPHIL # BLD: 0.4 K/UL (ref 0–0.4)
EOSINOPHIL NFR BLD: 7 % (ref 0–7)
ERYTHROCYTE [DISTWIDTH] IN BLOOD BY AUTOMATED COUNT: 16.7 % (ref 11.5–14.5)
GLOBULIN SER CALC-MCNC: 3.8 G/DL (ref 2–4)
GLUCOSE SERPL-MCNC: 104 MG/DL (ref 65–100)
HCT VFR BLD AUTO: 34.8 % (ref 36.6–50.3)
HGB BLD-MCNC: 11.4 G/DL (ref 12.1–17)
IMM GRANULOCYTES # BLD AUTO: 0 K/UL (ref 0–0.04)
IMM GRANULOCYTES NFR BLD AUTO: 0 % (ref 0–0.5)
LYMPHOCYTES # BLD: 1.9 K/UL (ref 0.8–3.5)
LYMPHOCYTES NFR BLD: 37 % (ref 12–49)
MAGNESIUM SERPL-MCNC: 2.1 MG/DL (ref 1.6–2.4)
MCH RBC QN AUTO: 22.8 PG (ref 26–34)
MCHC RBC AUTO-ENTMCNC: 32.8 G/DL (ref 30–36.5)
MCV RBC AUTO: 69.7 FL (ref 80–99)
MONOCYTES # BLD: 0.6 K/UL (ref 0–1)
MONOCYTES NFR BLD: 12 % (ref 5–13)
NEUTS SEG # BLD: 2.2 K/UL (ref 1.8–8)
NEUTS SEG NFR BLD: 44 % (ref 32–75)
NRBC # BLD: 0 K/UL (ref 0–0.01)
NRBC BLD-RTO: 0 PER 100 WBC
PLATELET # BLD AUTO: 221 K/UL (ref 150–400)
PMV BLD AUTO: 9.8 FL (ref 8.9–12.9)
POTASSIUM SERPL-SCNC: 4.3 MMOL/L (ref 3.5–5.1)
PROT SERPL-MCNC: 7.6 G/DL (ref 6.4–8.2)
RBC # BLD AUTO: 4.99 M/UL (ref 4.1–5.7)
RBC MORPH BLD: ABNORMAL
SODIUM SERPL-SCNC: 143 MMOL/L (ref 136–145)
WBC # BLD AUTO: 5.1 K/UL (ref 4.1–11.1)

## 2019-11-06 PROCEDURE — 83735 ASSAY OF MAGNESIUM: CPT

## 2019-11-06 PROCEDURE — 96367 TX/PROPH/DG ADDL SEQ IV INF: CPT

## 2019-11-06 PROCEDURE — 74011000258 HC RX REV CODE- 258: Performed by: INTERNAL MEDICINE

## 2019-11-06 PROCEDURE — 77030012965 HC NDL HUBR BBMI -A

## 2019-11-06 PROCEDURE — 96415 CHEMO IV INFUSION ADDL HR: CPT

## 2019-11-06 PROCEDURE — 85025 COMPLETE CBC W/AUTO DIFF WBC: CPT

## 2019-11-06 PROCEDURE — 74011250636 HC RX REV CODE- 250/636: Performed by: INTERNAL MEDICINE

## 2019-11-06 PROCEDURE — 80053 COMPREHEN METABOLIC PANEL: CPT

## 2019-11-06 PROCEDURE — 96368 THER/DIAG CONCURRENT INF: CPT

## 2019-11-06 PROCEDURE — 96375 TX/PRO/DX INJ NEW DRUG ADDON: CPT

## 2019-11-06 PROCEDURE — 36415 COLL VENOUS BLD VENIPUNCTURE: CPT

## 2019-11-06 PROCEDURE — 96413 CHEMO IV INFUSION 1 HR: CPT

## 2019-11-06 RX ORDER — ONDANSETRON 2 MG/ML
8 INJECTION INTRAMUSCULAR; INTRAVENOUS ONCE
Status: COMPLETED | OUTPATIENT
Start: 2019-11-06 | End: 2019-11-06

## 2019-11-06 RX ORDER — DEXAMETHASONE 4 MG/1
TABLET ORAL
Qty: 12 TAB | Refills: 0 | Status: SHIPPED | OUTPATIENT
Start: 2019-11-06 | End: 2019-11-26

## 2019-11-06 RX ORDER — ONDANSETRON 8 MG/1
8 TABLET, ORALLY DISINTEGRATING ORAL
Qty: 30 TAB | Refills: 2 | Status: SHIPPED | OUTPATIENT
Start: 2019-11-06 | End: 2020-11-16

## 2019-11-06 RX ORDER — HEPARIN 100 UNIT/ML
300-500 SYRINGE INTRAVENOUS AS NEEDED
Status: ACTIVE | OUTPATIENT
Start: 2019-11-06 | End: 2019-11-06

## 2019-11-06 RX ORDER — SODIUM CHLORIDE 9 MG/ML
10 INJECTION INTRAMUSCULAR; INTRAVENOUS; SUBCUTANEOUS AS NEEDED
Status: ACTIVE | OUTPATIENT
Start: 2019-11-06 | End: 2019-11-06

## 2019-11-06 RX ORDER — SODIUM CHLORIDE 0.9 % (FLUSH) 0.9 %
10 SYRINGE (ML) INJECTION AS NEEDED
Status: ACTIVE | OUTPATIENT
Start: 2019-11-06 | End: 2019-11-06

## 2019-11-06 RX ORDER — SODIUM CHLORIDE 9 MG/ML
25 INJECTION, SOLUTION INTRAVENOUS CONTINUOUS
Status: DISPENSED | OUTPATIENT
Start: 2019-11-06 | End: 2019-11-06

## 2019-11-06 RX ADMIN — SODIUM CHLORIDE 25 ML/HR: 900 INJECTION, SOLUTION INTRAVENOUS at 13:15

## 2019-11-06 RX ADMIN — CISPLATIN 185 MG: 100 INJECTION, SOLUTION INTRAVENOUS at 14:16

## 2019-11-06 RX ADMIN — POTASSIUM CHLORIDE: 2 INJECTION, SOLUTION, CONCENTRATE INTRAVENOUS at 11:54

## 2019-11-06 RX ADMIN — Medication 500 UNITS: at 17:20

## 2019-11-06 RX ADMIN — DEXAMETHASONE SODIUM PHOSPHATE 12 MG: 4 INJECTION, SOLUTION INTRA-ARTICULAR; INTRALESIONAL; INTRAMUSCULAR; INTRAVENOUS; SOFT TISSUE at 13:15

## 2019-11-06 RX ADMIN — ONDANSETRON 8 MG: 2 INJECTION, SOLUTION INTRAMUSCULAR; INTRAVENOUS at 13:15

## 2019-11-06 RX ADMIN — Medication 10 ML: at 09:15

## 2019-11-06 RX ADMIN — SODIUM CHLORIDE 10 ML: 9 INJECTION INTRAMUSCULAR; INTRAVENOUS; SUBCUTANEOUS at 09:15

## 2019-11-06 RX ADMIN — SODIUM CHLORIDE 150 MG: 900 INJECTION, SOLUTION INTRAVENOUS at 13:35

## 2019-11-06 RX ADMIN — POTASSIUM CHLORIDE: 2 INJECTION, SOLUTION, CONCENTRATE INTRAVENOUS at 14:15

## 2019-11-06 RX ADMIN — Medication 10 ML: at 17:20

## 2019-11-06 NOTE — Clinical Note
Hello! He is starting chemoRT today. Can you check in with him Thursday/Friday? He is doing great so far and has not had to use feeding tube. Thanks!

## 2019-11-06 NOTE — PROGRESS NOTES
Pharmacy Note- Chemotherapy Education     Yarelis Ochoa is a  60 y.o.male  diagnosed with Head and Neck Cancer here today for Cycle 1, Day 1 chemotherapy counseling. Mr. Uma Aquino is being treated with CISplatin every 3 weeks with radiation. Provided education on CISplatin and premedications - fosaprepitant, ondansetron, dexamethasone. Provided Mr. Uma Aquino with a handout and reviewed home use of ondansetron and dexamethasone for nause/vomiting.      Reviewed side effects of chemotherapy reviewed included s/s infection, anemia, appetite changes, thromboyctopenia, fatigue, hair loss/alopecia, bone pain, skin and nail changes, diarrhea/constipation, peripheral neuropathy, electrolyte disturbances, kidney changes, and hearing changes.     Patient given ways to manage these side effects and when to contact office.      [address] Handout of medications provided to patient.  Mr. Uma Aquino verbalized understanding of the information presented and all of the patient's questions were answered.     Lele Jung, PharmD, BCPS, BCOP

## 2019-11-06 NOTE — PROGRESS NOTES
[facility] [place] [facility] [place] [address], [address]  W: [phone]  F: [phone]    Reason for Visit:   Dinh Mary is a 60 y.o. 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 Size is measuring up to 2.5 cm in short axis. Right internal jugular vein is nearly fully compressed, no contralateral adenopathy seen. · 2019-ENT exam showed very inflamed tonsil on the right side with right Epley and surface, reportedly invading the pharyngeal wall-biopsy of the right tonsil showed invasive nonkeratinizing squamous cell carcinoma moderately differentiated  · 2019: PET CT Right tonsillar mass with hypermetabolic right cervical lymphadenopathy  · 19: cycle 1 cisplatin + RT    History of Present Illness:   Patient is a 60 y.o. male seen for SCC of the R tonsil    3 months ago he noted a small R neck swelling. Saw Dr. Pierre Zhang. Tried antibiotics for 10 days. Returned 19 and had an USG that showed adenopathy and then referred to Dr. Alon Paz. This led to above mentioned diagnosis. Comes in today for cycle 1 of Cisplatin + RT. Started radiation this morning. He feels very well. He has no dysphagia or mouth sores. He is eating and drinking without difficulty by mouth. He has PEG in place but has not had to use this, flushes this daily. He denies SOB, CP, cough, fevers/chills or bleeding. He has no baseline hearing impairment. Denies numbness/tingling in extremities.      He quit smoking 10 years ago, had smoked 1 ppd for 28 years    Sister had stage IV endometrial cancer in her 46s  Another sister  of ovarian cancer in her 62s     [address] and [address] reviewed above    [address]   Reviewed under HPI    Kb Garza  Reviewed under HPI    Current Outpatient Medications   Medication Sig    HYDROcodone-acetaminophen (NORCO) 5-325 mg per tablet Take 1 Tab by mouth.  hydroCHLOROthiazide (HYDRODIURIL) 12.5 mg tablet Take 12.5 mg by mouth daily.  omeprazole (PRILOSEC) 20 mg capsule Take 20 mg by mouth daily.  atorvastatin (LIPITOR) 20 mg tablet Take  by mouth daily.  multivitamin (ONE A DAY) tablet Take 1 Tab by mouth daily. No current facility-administered medications for this visit. No Known Allergies     Review of Systems: A complete review of systems was obtained, negative except as described above. Physical Exam:     Visit Vitals  /76   Pulse 78   Temp 100 °F (37.8 °C) (Oral)   Resp 18   Ht 5' 9\" (1.753 m)   Wt 151 lb 14.4 oz (68.9 kg)   SpO2 98%   BMI 22.43 kg/m²     ECOG PS: 1  General: No distress  Eyes: PERRLA, anicteric sclerae  HENT: R tonsil enlarged with irregular contour, with erythema, exudates, cant appreciate the posterior pharyngeal wall, palpable left cervical nodes  Neck: Supple, PAC looks good on R chest wall  Lymphatic: palpable L cervical nodes  Respiratory: CTAB, normal respiratory effort  CV: Normal rate, regular rhythm, no murmurs, no peripheral edema  GI: Soft, nontender, nondistended, no masses, no hepatomegaly, no splenomegaly, PEG tube in place  MS: Normal gait and station. Digits without clubbing or cyanosis. Skin: No rashes, ecchymoses, or petechiae. Normal temperature, turgor, and texture. Psych: Alert, oriented, appropriate affect, normal judgment/insight    Results:   No results found for: WBC, HGB, HCT, PLT, MCV, ANEU, HGBPOC, HCTPOC, HGBEXT, HCTEXT, PLTEXT, HGBEXT, HCTEXT, PLTEXT  No results found for: NA, K, CL, CO2, GLU, BUN, CREA, GFRAA, GFRNA, CA, NAPOC, KPOCT, CLPOC, GLUCPOC, IBUN, CREAPOC, ICAI  No results found for: TBILI, ALT, SGOT, AP, TP, ALB, GLOB  Records reviewed and summarized above. Pathology report(s) reviewed above. Radiology report(s) reviewed above.       Assessment:   1) R tonsil SCC    CT neck and ENT exam reports reviewed  Per discussion with Dr. Yandel Potts the tonsillar mass may be involving the pharyngeal wall  CT suggests multiple ipsilateral nodes , atleast one ~3  cm and none > 6 cm  PET CT shows no distant mets  HPV positive    Clinically at least T2N1- Though could be T3N1 - HPV postive- stage I-II  He does have a h/o smoking for 35 years    Surgery not recommended by ENT due to disease extent and likelihood of poor functional outcomes    He has an otherwise excellent PS     Today is cycle 1 day 1 of cisplatin + RT. Anti-emetics, steroids, and side effects reviewed with him today. 2) H/O Smoking  Quit    3)  FH of Cancers  Should consider genetic counseling    4) Risk of dysphagia and dehydration  PEG placed   RD following   Weekly hydration arranged     5) Psychosocial  Coping well  Met with SW  Supportive family    Plan:     · Proceed today with cycle 1 of Cisplatin 100 mg/m² q21 Days x 3 Cycles with concurrent Radiation  · Emend, Zofran, Dex for premeds   · Labs to include cbc with diff, CMP, and mag   · Dexamethasone 8mg PO daily on days 2&3 sent to pharmacy on file  · Zofran PRN sent to pharmacy on file  · Magic mouthwash PRN rx given to patient   · RD following   · Weekly [facility] apts for IVF and anti-emetics if needed    Follow-up in 1 week for hydration & symptom assessment     I appreciate the opportunity to participate in Mr. Rosa Isela clayton.     Signed By: Ruby Ortiz MD

## 2019-11-06 NOTE — PROGRESS NOTES
Karson Canales is a 60 y.o. male  Chief Complaint   Patient presents with    Follow-up     Tonsil cancer     1. Have you been to the ER, urgent care clinic since your last visit? Hospitalized since your last visit? No    2. Have you seen or consulted any other health care providers outside of the [address] [place] [address] since your last visit? Include any pap smears or colon screening.  Yes Reason for visit: oral surgery

## 2019-11-06 NOTE — PROGRESS NOTES
0915 Pt admit to [facility] for C1D1 Cisplatin ambulatory in stable condition. Assessment completed. No new concerns voiced. Port accessed and flushed with positive blood return. Labs drawn per order and sent for processing. Patient to MD office. Patient returned to \[facility]\"", Normal Saline started at [facility].     Visit Vitals  /65   Pulse 78   Temp 99 °F (37.2 °C)   Resp 18   Ht 5' 9\" (1.753 m)   Wt 68.9 kg (151 lb 14.4 oz)   BMI 22.43 kg/m²       Medications:  Medications Administered     0.9% sodium chloride 1,000 mL with potassium chloride 10 mEq, magnesium sulfate 2 g infusion     Admin Date  11/06/2019 Action  Given Dose   Rate  1,000 mL/hr Route  IntraVENous Administered By  Umer Pradhan RN           Admin Date  11/06/2019 Action  Given Dose   Rate  1,000 mL/hr Route  IntraVENous Administered By  Umer Pradhan RN          0.9% sodium chloride infusion     Admin Date  11/06/2019 Action  New Bag Dose  25 mL/hr Rate  25 mL/hr Route  IntraVENous Administered By  Umer Pradhan RN          CISplatin (PLATINOL) 185 mg in 0.9% sodium chloride 500 mL, overfill volume 50 mL chemo infusion     Admin Date  11/06/2019 Action  New Bag Dose  185 mg Rate  367.5 mL/hr Route  IntraVENous Administered By  Umer Pradhan RN          dexamethasone (DECADRON) 12 mg in 0.9% sodium chloride 50 mL, overfill volume 5 mL IVPB     Admin Date  11/06/2019 Action  Given Dose  12 mg Rate  232 mL/hr Route  IntraVENous Administered By  Umer Pradhan RN          fosaprepitant (EMEND) 150 mg in 0.9% sodium chloride 150 mL IVPB     Admin Date  11/06/2019 Action  Given Dose  150 mg Rate  450 mL/hr Route  IntraVENous Administered By  Umer Pradhan RN          heparin (porcine) pf 300-500 Units     Admin Date  11/06/2019 Action  Given Dose  500 Units Route  InterCATHeter Administered By  Umer Pradhan RN          ondansetron [facility]) injection 8 mg     Admin Date  11/06/2019 Action  Given Dose  8 mg Route  IntraVENous Administered By  Sparkle Christopher RN          saline peripheral flush soln 10 mL     Admin Date  11/06/2019 Action  Given Dose  10 mL Route  InterCATHeter Administered By  Sparkle Christopher RN           Admin Date  11/06/2019 Action  Given Dose  10 mL Route  InterCATHeter Administered By  Sparkle Christopher RN          sodium chloride 0.9% injection 10 mL     Admin Date  11/06/2019 Action  Given Dose  10 mL Route  IntraVENous Administered By  Sparkle Christopher RN                  1720 Pt tolerated treatment well. Port maintained positive blood return throughout treatment, flushed with positive blood return at conclusion and port heparinized and de-accessed. D/c home ambulatory in no distress. Pt aware of [facility] appointment scheduled for 11/13/19. Recent Results (from the past 12 hour(s))   CBC WITH AUTOMATED DIFF    Collection Time: 11/06/19  8:53 AM   Result Value Ref Range    WBC 5.1 4.1 - 11.1 K/uL    RBC 4.99 4. 10 - 5.70 M/uL    HGB 11.4 (L) 12.1 - 17.0 g/dL    HCT 34.8 (L) 36.6 - 50.3 %    MCV 69.7 (L) 80.0 - 99.0 FL    MCH 22.8 (L) 26.0 - 34.0 PG    MCHC 32.8 30.0 - 36.5 g/dL    RDW 16.7 (H) 11.5 - 14.5 %    PLATELET 078 008 - 902 K/uL    MPV 9.8 8.9 - 12.9 FL    NRBC 0.0 0  WBC    ABSOLUTE NRBC 0.00 0.00 - 0.01 K/uL    NEUTROPHILS 44 32 - 75 %    LYMPHOCYTES 37 12 - 49 %    MONOCYTES 12 5 - 13 %    EOSINOPHILS 7 0 - 7 %    BASOPHILS 0 0 - 1 %    IMMATURE GRANULOCYTES 0 0.0 - 0.5 %    ABS. NEUTROPHILS 2.2 1.8 - 8.0 K/UL    ABS. LYMPHOCYTES 1.9 0.8 - 3.5 K/UL    ABS. MONOCYTES 0.6 0.0 - 1.0 K/UL    ABS. EOSINOPHILS 0.4 0.0 - 0.4 K/UL    ABS. BASOPHILS 0.0 0.0 - 0.1 K/UL    ABS. IMM.  GRANS. 0.0 0.00 - 0.04 K/UL    DF SMEAR SCANNED      RBC COMMENTS ANISOCYTOSIS  1+        RBC COMMENTS MICROCYTOSIS  2+        RBC COMMENTS HYPOCHROMIA  1+       METABOLIC PANEL, COMPREHENSIVE    Collection Time: 11/06/19  8:53 AM   Result Value Ref Range    Sodium 143 136 - 145 mmol/L    Potassium 4.3 3.5 - 5.1 mmol/L    Chloride 110 (H) 97 - 108 mmol/L    CO2 29 21 - 32 mmol/L    Anion gap 4 (L) 5 - 15 mmol/L    Glucose 104 (H) 65 - 100 mg/dL    BUN 14 6 - 20 MG/DL    Creatinine 0.79 0.70 - 1.30 MG/DL    BUN/Creatinine ratio 18 12 - 20      GFR est AA >60 >60 ml/min/1.73m2    GFR est non-AA >60 >60 ml/min/1.73m2    Calcium 9.1 8.5 - 10.1 MG/DL    Bilirubin, total 0.3 0.2 - 1.0 MG/DL    ALT (SGPT) 24 12 - 78 U/L    AST (SGOT) 17 15 - 37 U/L    Alk.  phosphatase 100 45 - 117 U/L    Protein, total 7.6 6.4 - 8.2 g/dL    Albumin 3.8 3.5 - 5.0 g/dL    Globulin 3.8 2.0 - 4.0 g/dL    A-G Ratio 1.0 (L) 1.1 - 2.2     MAGNESIUM    Collection Time: 11/06/19  8:53 AM   Result Value Ref Range    Magnesium 2.1 1.6 - 2.4 mg/dL

## 2019-11-07 ENCOUNTER — TELEPHONE (OUTPATIENT)
Dept: ONCOLOGY | Age: 64
End: 2019-11-07

## 2019-11-07 DIAGNOSIS — R06.6 HICCUPS: Primary | ICD-10-CM

## 2019-11-07 RX ORDER — BACLOFEN 10 MG/1
10 TABLET ORAL 3 TIMES DAILY
Qty: 21 TAB | Refills: 0 | Status: SHIPPED | OUTPATIENT
Start: 2019-11-07 | End: 2020-02-17 | Stop reason: SDUPTHER

## 2019-11-07 NOTE — TELEPHONE ENCOUNTER
Patient called and stated that he had his first chemo yesterday and now has the hiccups. Patient stated that they started this morning. Patient would like to know what he can do because nothing is helping.  # [phone]

## 2019-11-07 NOTE — TELEPHONE ENCOUNTER
Called patient per NP Vanessa Quick request; no answer; LVM to call office back     [phone]: patient returned call; Sudarshan Nugent confirmed x2 identifiers   Informed patient that hiccups are most likely to dexamethasone and prescription for baclofen has been ordered   Informed patient to take medication as prescribed, steroid would be modified next treatment, and will re-assess tomorrow afternoon     Patient verbalized understanding

## 2019-11-07 NOTE — TELEPHONE ENCOUNTER
[facility] determine if patient has been scheduled to meeting SLP. He is currently scheduled on November 18th.

## 2019-11-07 NOTE — TELEPHONE ENCOUNTER
I will send him baclofen for hiccups to his pharmacy on file. He can follow directions on bottle. Likely due to dexamethasone so if hiccups continue we can switch to a different steroid for cycle 2.

## 2019-11-08 ENCOUNTER — DOCUMENTATION ONLY (OUTPATIENT)
Dept: ONCOLOGY | Age: 64
End: 2019-11-08

## 2019-11-08 ENCOUNTER — TELEPHONE (OUTPATIENT)
Dept: ONCOLOGY | Age: 64
End: 2019-11-08

## 2019-11-08 ENCOUNTER — APPOINTMENT (OUTPATIENT)
Dept: INFUSION THERAPY | Age: 64
End: 2019-11-08
Payer: COMMERCIAL

## 2019-11-08 NOTE — TELEPHONE ENCOUNTER
Called patient to re-assess; patient answered and HIPPA verified x2 identifiers   Patient stated hiccups have improved   Denies nausea, vomiting, diarrhea     Went over upcoming appointments with patient   Patient verbalized understanding   No new questions or concerns at this time

## 2019-11-11 NOTE — PROGRESS NOTES
[facility] [place] at [facility], [address]  [address]: [phone]  F: [phone]    Medical Nutrition Therapy      Reason for nutrition visit: He started chemo on 11/6/19. He is eating and drinking without difficulty. Stressed importance of nutrition. Drinking a homemade shake. Results:   Diagnosis: Squamous cell carcinoma of the right tonsil - HPV positive   Chemotherapy- cisplatin every 3 weeks - first cycle on 11/6/19  Radiation- Started on 11/6/19 and end date planned for 12/26/19     Wt Readings from Last 5 Encounters:   11/06/19 151 lb 14.4 oz (68.9 kg)   11/06/19 151 lb 14.4 oz (68.9 kg)   10/10/19 154 lb (69.9 kg)   10/01/19 155 lb 3.2 oz (70.4 kg)   09/30/19 154 lb (69.9 kg)     Estimated Nutrition Needs:   Calorie Range: 2380-2756kcal/day     Protein Range: 81-91g/day     Fluid Needs: 2600ml     Assessment:   Inadequate energy intake (potential) related to concurrent chemotherapy/radiation  as evidence by treatment side effects. Plan:   - Increase calorie intake. - Follow up with SLP at scheduled appointment.  - No diet restrictions. I appreciate the opportunity to participate in Mr. Anuj clayton.     Signed By: Taniya Liriano, [address] [place] [address], [address] [place] [address] , [address]     Contact: [phone]

## 2019-11-13 ENCOUNTER — OFFICE VISIT (OUTPATIENT)
Dept: ONCOLOGY | Age: 64
End: 2019-11-13

## 2019-11-13 ENCOUNTER — HOSPITAL ENCOUNTER (OUTPATIENT)
Dept: INFUSION THERAPY | Age: 64
Discharge: HOME | End: 2019-11-13
Payer: COMMERCIAL

## 2019-11-13 VITALS
HEART RATE: 80 BPM | RESPIRATION RATE: 18 BRPM | OXYGEN SATURATION: 97 % | TEMPERATURE: 98.1 F | SYSTOLIC BLOOD PRESSURE: 127 MMHG | DIASTOLIC BLOOD PRESSURE: 63 MMHG

## 2019-11-13 VITALS
OXYGEN SATURATION: 97 % | DIASTOLIC BLOOD PRESSURE: 64 MMHG | HEIGHT: 69 IN | RESPIRATION RATE: 18 BRPM | SYSTOLIC BLOOD PRESSURE: 98 MMHG | TEMPERATURE: 99 F | WEIGHT: 151.9 LBS | BODY MASS INDEX: 22.5 KG/M2 | HEART RATE: 94 BPM

## 2019-11-13 DIAGNOSIS — C09.9 TONSIL CANCER (HCC): Primary | ICD-10-CM

## 2019-11-13 DIAGNOSIS — R11.0 CHEMOTHERAPY-INDUCED NAUSEA: ICD-10-CM

## 2019-11-13 DIAGNOSIS — T45.1X5A CHEMOTHERAPY-INDUCED NAUSEA: ICD-10-CM

## 2019-11-13 PROCEDURE — 74011250636 HC RX REV CODE- 250/636: Performed by: REGISTERED NURSE

## 2019-11-13 RX ORDER — ONDANSETRON 2 MG/ML
8 INJECTION INTRAMUSCULAR; INTRAVENOUS ONCE
Status: COMPLETED | OUTPATIENT
Start: 2019-11-13 | End: 2019-11-13

## 2019-11-13 RX ORDER — OLANZAPINE 10 MG/1
10 TABLET ORAL
Qty: 30 TAB | Refills: 2 | Status: SHIPPED | OUTPATIENT
Start: 2019-11-13 | End: 2019-12-03

## 2019-11-13 RX ADMIN — ONDANSETRON 8 MG: 2 INJECTION INTRAMUSCULAR; INTRAVENOUS at 12:55

## 2019-11-13 RX ADMIN — SODIUM CHLORIDE 1000 ML: 900 INJECTION, SOLUTION INTRAVENOUS at 12:53

## 2019-11-13 NOTE — PROGRESS NOTES
[facility] [place] at [place] [address], [address]  W: [phone]  F: [phone]    Reason for Visit:   Charlotte Hurd is a 60 y.o. 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 Size is measuring up to 2.5 cm in short axis. Right internal jugular vein is nearly fully compressed, no contralateral adenopathy seen. · 2019-ENT exam showed very inflamed tonsil on the right side with right Epley and surface, reportedly invading the pharyngeal wall-biopsy of the right tonsil showed invasive nonkeratinizing squamous cell carcinoma moderately differentiated  · 2019: PET CT Right tonsillar mass with hypermetabolic right cervical lymphadenopathy  · 19: cycle 1 cisplatin + RT    History of Present Illness:   Patient is a 60 y.o. male seen for SCC of the R tonsil    3 months ago he noted a small R neck swelling. Saw Dr. Coni Ortega. Tried antibiotics for 10 days. Returned 19 and had an USG that showed adenopathy and then referred to Dr. Kayla Eastman. This led to above mentioned diagnosis. Comes in today for follow-up. He has nausea. He has been taking zofran but not consistently. He has had no emesis. He feels tired. He has no dysphagia or mouth sores. He is eating and drinking without difficulty by mouth. He has PEG in place but has not had to use this, flushes this daily. He denies SOB, CP, cough, fevers/chills or bleeding. Denies numbness/tingling in extremities.      He quit smoking 10 years ago, had smoked 1 ppd for 28 years    Sister had stage IV endometrial cancer in her 46s  Another sister  of ovarian cancer in her 62s     [address] [facility]    [address]   Reviewed under HPI      Reviewed under HPI    Current Outpatient Medications   Medication Sig    baclofen (LIORESAL) 10 mg tablet Take 1 Tab by mouth three (3) times daily.  dexAMETHasone (DECADRON) 4 mg tablet Take 2 tabs (8mg) on days 2&3 of chemotherapy    aluminum-magnesium hydroxide 200-200 mg/5 mL susp 5 mL, diphenhydrAMINE 12.5 mg/5 mL liqd 12.5 mg, lidocaine 2 % soln 5 mL 5 mL by Swish and Spit route two (2) times a day. Maalox  Lidocaine 2% viscous   Diphenhydramine oral solution     Pharmacy to mix equal portions of ingredients to a total volume as indicated in the dispense amount. Swish&spit 5mL (1 tsp) 4 times daily as needed    ondansetron (ZOFRAN ODT) 8 mg disintegrating tablet Take 1 Tab by mouth every eight (8) hours as needed for Nausea.  HYDROcodone-acetaminophen (NORCO) 5-325 mg per tablet Take 1 Tab by mouth.  hydroCHLOROthiazide (HYDRODIURIL) 12.5 mg tablet Take 12.5 mg by mouth daily.  omeprazole (PRILOSEC) 20 mg capsule Take 20 mg by mouth daily.  atorvastatin (LIPITOR) 20 mg tablet Take  by mouth daily.  multivitamin (ONE A DAY) tablet Take 1 Tab by mouth daily. No current facility-administered medications for this visit. Facility-Administered Medications Ordered in Other Visits   Medication Dose Route Frequency    sodium chloride 0.9 % bolus infusion 1,000 mL  1,000 mL IntraVENous ONCE      No Known Allergies     Review of Systems: A complete review of systems was obtained, negative except as described above.     Physical Exam:     Visit Vitals  BP 98/64   Pulse 94   Temp 99 °F (37.2 °C)   Resp 18   Ht 5' 9\" (1.753 m)   Wt 151 lb 14.4 oz (68.9 kg)   SpO2 97%   BMI 22.43 kg/m²     ECOG PS: 1  General: No distress  Eyes: PERRLA, anicteric sclerae  HENT: R tonsil enlarged with irregular contour, with erythema, exudates, cant appreciate the posterior pharyngeal wall, palpable left cervical nodes  Neck: Supple, PAC looks good on R chest wall  Lymphatic: palpable L cervical nodes  Respiratory: CTAB, normal respiratory effort  CV: Normal rate, regular rhythm, no murmurs, no peripheral edema  GI: Soft, nontender, nondistended, no masses, no hepatomegaly, no splenomegaly, PEG tube in place  MS: Normal gait and station. Digits without clubbing or cyanosis. Skin: No rashes, ecchymoses, or petechiae. Normal temperature, turgor, and texture. Psych: Alert, oriented, appropriate affect, normal judgment/insight    Results:     Lab Results   Component Value Date/Time    WBC 5.1 11/06/2019 08:53 AM    HGB 11.4 (L) 11/06/2019 08:53 AM    HCT 34.8 (L) 11/06/2019 08:53 AM    PLATELET 567 75/57/5010 08:53 AM    MCV 69.7 (L) 11/06/2019 08:53 AM    ABS. NEUTROPHILS 2.2 11/06/2019 08:53 AM     Lab Results   Component Value Date/Time    Sodium 143 11/06/2019 08:53 AM    Potassium 4.3 11/06/2019 08:53 AM    Chloride 110 (H) 11/06/2019 08:53 AM    CO2 29 11/06/2019 08:53 AM    Glucose 104 (H) 11/06/2019 08:53 AM    BUN 14 11/06/2019 08:53 AM    Creatinine 0.79 11/06/2019 08:53 AM    GFR est AA >60 11/06/2019 08:53 AM    GFR est non-AA >60 11/06/2019 08:53 AM    Calcium 9.1 11/06/2019 08:53 AM     Lab Results   Component Value Date/Time    Bilirubin, total 0.3 11/06/2019 08:53 AM    ALT (SGPT) 24 11/06/2019 08:53 AM    AST (SGOT) 17 11/06/2019 08:53 AM    Alk. phosphatase 100 11/06/2019 08:53 AM    Protein, total 7.6 11/06/2019 08:53 AM    Albumin 3.8 11/06/2019 08:53 AM    Globulin 3.8 11/06/2019 08:53 AM     Records reviewed and summarized above. Pathology report(s) reviewed above. Radiology report(s) reviewed above.       Assessment:   1) R tonsil SCC    CT neck and ENT exam reports reviewed  Per discussion with Dr. Jackie Mckinley the tonsillar mass may be involving the pharyngeal wall  CT suggests multiple ipsilateral nodes , atleast one ~3  cm and none > 6 cm  PET CT shows no distant mets  HPV positive    Clinically at least T2N1- Though could be T3N1 - HPV postive- stage I-II  He does have a h/o smoking for 35 years    Surgery not recommended by ENT due to disease extent and likelihood of poor functional outcomes    He has an otherwise excellent PS     Received cycle 1 of Cisplatin last week, continues daily radiation  Today he has grade 1 nausea without emesis, see below  Also has grade 1 fatigue    2) H/O Smoking  Quit    3)  FH of Cancers  Should consider genetic counseling    4) Risk of dysphagia and dehydration  PEG placed   RD following   Weekly hydration arranged     5) Psychosocial  Coping well  Met with SW  Supportive family    6) Nausea  Grade 1  Instructed on taking zofran every 8 hours on a regular schedule  Will add Zyprexa QHS  zofran IV today in [facility]     Plan:     · Continue concurrent chemoRT   · Zofran 8mg q8 hours on a regular schedule  · Zyprexa QHS   · Magic mouthwash PRN  · RD following   · Weekly [facility] apts for IVF     Follow-up in 1 week for hydration & symptom assessment     I appreciate the opportunity to participate in Mr. Taylor clayton.     Signed By: Debra Quinteros MD

## 2019-11-13 NOTE — PROGRESS NOTES
Mala Geiger is a 60 y.o. male  Chief Complaint   Patient presents with    Follow-up     Squamous cell carcinoma      1. Have you been to the ER, urgent care clinic since your last visit? Hospitalized since your last visit? No    2. Have you seen or consulted any other health care providers outside of the [address] [place] [address] since your last visit? Include any pap smears or colon screening.  No

## 2019-11-14 ENCOUNTER — DOCUMENTATION ONLY (OUTPATIENT)
Dept: ONCOLOGY | Age: 64
End: 2019-11-14

## 2019-11-14 ENCOUNTER — PATIENT MESSAGE (OUTPATIENT)
Dept: ONCOLOGY | Age: 64
End: 2019-11-14

## 2019-11-15 ENCOUNTER — APPOINTMENT (OUTPATIENT)
Dept: INFUSION THERAPY | Age: 64
End: 2019-11-15
Payer: COMMERCIAL

## 2019-11-15 NOTE — PROGRESS NOTES
[facility] [place] at [facility]   [address] [facility] [address], [address]  [address]: [phone]  F: [phone] Nutrition Therapy      Reason for nutrition visit:  He is having more fatigue and some nausea. Taking zofran. Yesterday he had a hard boiled egg, homemade shake (yogurt, banana, carrots, almond milk), and for dinner he had rice and a sandwich. He states he did have a boost to drink too during the day. Weight stable, but expressed concern about inadequate calories. Reinforced importance of nutrition and getting enough calories. He report some mild throat pain with swallowing. Results:   Diagnosis: Squamous cell carcinoma of the right tonsil - HPV positive   Chemotherapy- cisplatin every 3 weeks - first cycle on 11/6/19  Radiation- Started on 11/6/19 and end date planned for 12/26/19     Wt Readings from Last 5 Encounters:   11/13/19 151 lb 14.4 oz (68.9 kg)   11/06/19 151 lb 14.4 oz (68.9 kg)   11/06/19 151 lb 14.4 oz (68.9 kg)   10/10/19 154 lb (69.9 kg)   10/01/19 155 lb 3.2 oz (70.4 kg)     Estimated Nutrition Needs:   Calorie Range: 2380-2756kcal/day     Protein Range: 81-91g/day     Fluid Needs: 2600ml     Assessment:   Inadequate energy intake (potential) related to concurrent chemotherapy/radiation  as evidence by treatment side effects. Plan:   - Increase calorie intake. - Follow up with SLP at scheduled appointment.  - No diet restrictions. I appreciate the opportunity to participate in Mr. Roma clayton.     Signed By: Andrew Cormier, [address], [address] [place] [address] , [address]     Contact: [phone]

## 2019-11-19 ENCOUNTER — TELEPHONE (OUTPATIENT)
Dept: ONCOLOGY | Age: 64
End: 2019-11-19

## 2019-11-19 NOTE — TELEPHONE ENCOUNTER
Called the patient and verified ID x 2. Informed the patient that per Eusebio Cortes NP the requested letter is ready for him to . The patient verbalized understanding and denied any questions or concerns.

## 2019-11-20 ENCOUNTER — HOSPITAL ENCOUNTER (OUTPATIENT)
Dept: INFUSION THERAPY | Age: 64
Discharge: HOME | End: 2019-11-20
Payer: COMMERCIAL

## 2019-11-20 ENCOUNTER — OFFICE VISIT (OUTPATIENT)
Dept: ONCOLOGY | Age: 64
End: 2019-11-20

## 2019-11-20 VITALS
TEMPERATURE: 97.1 F | DIASTOLIC BLOOD PRESSURE: 73 MMHG | RESPIRATION RATE: 18 BRPM | SYSTOLIC BLOOD PRESSURE: 111 MMHG | HEART RATE: 73 BPM

## 2019-11-20 VITALS
DIASTOLIC BLOOD PRESSURE: 67 MMHG | WEIGHT: 151 LBS | HEIGHT: 69 IN | SYSTOLIC BLOOD PRESSURE: 96 MMHG | RESPIRATION RATE: 17 BRPM | TEMPERATURE: 98.3 F | OXYGEN SATURATION: 98 % | BODY MASS INDEX: 22.36 KG/M2 | HEART RATE: 89 BPM

## 2019-11-20 DIAGNOSIS — F17.200 SMOKING: ICD-10-CM

## 2019-11-20 DIAGNOSIS — C09.9 TONSIL CANCER (HCC): Primary | ICD-10-CM

## 2019-11-20 PROCEDURE — 74011250636 HC RX REV CODE- 250/636: Performed by: REGISTERED NURSE

## 2019-11-20 PROCEDURE — 77030012965 HC NDL HUBR BBMI -A

## 2019-11-20 PROCEDURE — 74011250636 HC RX REV CODE- 250/636: Performed by: INTERNAL MEDICINE

## 2019-11-20 PROCEDURE — 96360 HYDRATION IV INFUSION INIT: CPT

## 2019-11-20 RX ORDER — SODIUM CHLORIDE 9 MG/ML
10 INJECTION INTRAMUSCULAR; INTRAVENOUS; SUBCUTANEOUS AS NEEDED
Status: DISCONTINUED | OUTPATIENT
Start: 2019-11-20 | End: 2019-11-21 | Stop reason: HOSPADM

## 2019-11-20 RX ORDER — SODIUM CHLORIDE 0.9 % (FLUSH) 0.9 %
5-10 SYRINGE (ML) INJECTION AS NEEDED
Status: DISCONTINUED | OUTPATIENT
Start: 2019-11-20 | End: 2019-11-21 | Stop reason: HOSPADM

## 2019-11-20 RX ORDER — HEPARIN 100 UNIT/ML
500 SYRINGE INTRAVENOUS AS NEEDED
Status: DISCONTINUED | OUTPATIENT
Start: 2019-11-20 | End: 2019-11-21 | Stop reason: HOSPADM

## 2019-11-20 RX ADMIN — Medication 10 ML: at 11:17

## 2019-11-20 RX ADMIN — SODIUM CHLORIDE 1000 ML: 900 INJECTION, SOLUTION INTRAVENOUS at 11:00

## 2019-11-20 RX ADMIN — SODIUM CHLORIDE 10 ML: 9 INJECTION INTRAMUSCULAR; INTRAVENOUS; SUBCUTANEOUS at 11:17

## 2019-11-20 RX ADMIN — Medication 500 UNITS: at 11:16

## 2019-11-20 NOTE — PROGRESS NOTES
Salvador Tejeda is a 60 y.o. male  Chief Complaint   Patient presents with    Follow-up     tonsil cancer      1. Have you been to the ER, urgent care clinic since your last visit? Hospitalized since your last visit? No    2. Have you seen or consulted any other health care providers outside of the [address] [place] [address] since your last visit? Include any pap smears or colon screening.  No

## 2019-11-20 NOTE — PROGRESS NOTES
[facility] [place] at [place] [address], [address]  W: [phone]  F: [phone]    Reason for Visit:   Ligia Bruner is a 60 y.o. 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 Size is measuring up to 2.5 cm in short axis. Right internal jugular vein is nearly fully compressed, no contralateral adenopathy seen. · 2019-ENT exam showed very inflamed tonsil on the right side with right Epley and surface, reportedly invading the pharyngeal wall-biopsy of the right tonsil showed invasive nonkeratinizing squamous cell carcinoma moderately differentiated  · 2019: PET CT Right tonsillar mass with hypermetabolic right cervical lymphadenopathy  · 19: cycle 1 cisplatin + RT    History of Present Illness:   Patient is a 60 y.o. male seen for SCC of the R tonsil    3 months ago he noted a small R neck swelling. Saw Dr. Ellen Lemon. Tried antibiotics for 10 days. Returned 19 and had an USG that showed adenopathy and then referred to Dr. Roscoe Delaney. This led to above mentioned diagnosis. Comes today for interm visit and IVF. Nausea has improved and zofran is helping as needed. He has fatigue that is relieved by rest. He has some soreness over mass that radiates to his right ear. He has no ringing in hears or no diminished hearing. He is eating and drinking well but last lost a little bit of weight. He has not required PEG tube. He denies mouth sores but reports some dysphagia. He denies SOB, CP, cough, fevers/chills or bleeding. Denies numbness/tingling in extremities.      He quit smoking 10 years ago, had smoked 1 ppd for 28 years    Sister had stage IV endometrial cancer in her 46s  Another sister  of ovarian cancer in her 62s     PMH and PSH reviewed above    [address]   Reviewed under HPI      Reviewed under HPI    Current Outpatient Medications   Medication Sig    baclofen (LIORESAL) 10 mg tablet Take 1 Tab by mouth three (3) times daily.  dexAMETHasone (DECADRON) 4 mg tablet Take 2 tabs (8mg) on days 2&3 of chemotherapy    aluminum-magnesium hydroxide 200-200 mg/5 mL susp 5 mL, diphenhydrAMINE 12.5 mg/5 mL liqd 12.5 mg, lidocaine 2 % soln 5 mL 5 mL by Swish and Spit route two (2) times a day. Maalox  Lidocaine 2% viscous   Diphenhydramine oral solution     Pharmacy to mix equal portions of ingredients to a total volume as indicated in the dispense amount. Swish&spit 5mL (1 tsp) 4 times daily as needed    ondansetron (ZOFRAN ODT) 8 mg disintegrating tablet Take 1 Tab by mouth every eight (8) hours as needed for Nausea.  hydroCHLOROthiazide (HYDRODIURIL) 12.5 mg tablet Take 12.5 mg by mouth daily.  atorvastatin (LIPITOR) 20 mg tablet Take  by mouth daily.  OLANZapine (ZYPREXA) 10 mg tablet Take 1 Tab by mouth nightly.  HYDROcodone-acetaminophen (NORCO) 5-325 mg per tablet Take 1 Tab by mouth.  omeprazole (PRILOSEC) 20 mg capsule Take 20 mg by mouth daily.  multivitamin (ONE A DAY) tablet Take 1 Tab by mouth daily. No current facility-administered medications for this visit. Facility-Administered Medications Ordered in Other Visits   Medication Dose Route Frequency    heparin (porcine) pf 500 Units  500 Units IntraVENous PRN    sodium chloride (NS) flush 5-10 mL  5-10 mL IntraVENous PRN    sodium chloride 0.9% injection 10 mL  10 mL IntraVENous PRN      No Known Allergies     Review of Systems: A complete review of systems was obtained, negative except as described above.     Physical Exam:     Visit Vitals  BP 96/67   Pulse 89   Temp 98.3 °F (36.8 °C) (Oral)   Resp 17   Ht 5' 9\" (1.753 m)   Wt 151 lb (68.5 kg)   SpO2 98%   BMI 22.30 kg/m²     ECOG PS: 1  General: No distress  Eyes: PERRLA, anicteric sclerae  HENT: R tonsil enlarged with irregular contour, with erythema, exudates, cant appreciate the posterior pharyngeal wall, palpable left cervical nodes unchanged   Neck: Supple, PAC looks good on R chest wall  Lymphatic: palpable L cervical nodes  Respiratory: CTAB, normal respiratory effort  CV: Normal rate, regular rhythm, no murmurs, no peripheral edema  GI: Soft, nontender, nondistended, no masses, no hepatomegaly, no splenomegaly, PEG tube in place  MS: Normal gait and station. Digits without clubbing or cyanosis. Skin: No rashes, ecchymoses, or petechiae. Normal temperature, turgor, and texture. Psych: Alert, oriented, appropriate affect, normal judgment/insight    Results:     Lab Results   Component Value Date/Time    WBC 5.1 11/06/2019 08:53 AM    HGB 11.4 (L) 11/06/2019 08:53 AM    HCT 34.8 (L) 11/06/2019 08:53 AM    PLATELET 433 95/45/2960 08:53 AM    MCV 69.7 (L) 11/06/2019 08:53 AM    ABS. NEUTROPHILS 2.2 11/06/2019 08:53 AM     Lab Results   Component Value Date/Time    Sodium 143 11/06/2019 08:53 AM    Potassium 4.3 11/06/2019 08:53 AM    Chloride 110 (H) 11/06/2019 08:53 AM    CO2 29 11/06/2019 08:53 AM    Glucose 104 (H) 11/06/2019 08:53 AM    BUN 14 11/06/2019 08:53 AM    Creatinine 0.79 11/06/2019 08:53 AM    GFR est AA >60 11/06/2019 08:53 AM    GFR est non-AA >60 11/06/2019 08:53 AM    Calcium 9.1 11/06/2019 08:53 AM     Lab Results   Component Value Date/Time    Bilirubin, total 0.3 11/06/2019 08:53 AM    ALT (SGPT) 24 11/06/2019 08:53 AM    AST (SGOT) 17 11/06/2019 08:53 AM    Alk. phosphatase 100 11/06/2019 08:53 AM    Protein, total 7.6 11/06/2019 08:53 AM    Albumin 3.8 11/06/2019 08:53 AM    Globulin 3.8 11/06/2019 08:53 AM     Records reviewed and summarized above. Pathology report(s) reviewed above. Radiology report(s) reviewed above.       Assessment:   1) R tonsil SCC    CT neck and ENT exam reports reviewed  Per discussion with Dr. Mariia Sotomayor the tonsillar mass may be involving the pharyngeal wall  CT suggests multiple ipsilateral nodes , atleast one ~3  cm and none > 6 cm  PET CT shows no distant mets  HPV positive    Clinically at least T2N1- Though could be T3N1 - HPV postive- stage I-II  He does have a h/o smoking for 35 years    Surgery not recommended by ENT due to disease extent and likelihood of poor functional outcomes    He has an otherwise excellent PS     Received cycle 1 of Cisplatin, toxicities include grade 1 nausea, grade 1 fatigue, and grade 1 mucositis    2) H/O Smoking  Quit    3)  FH of Cancers  Should consider genetic counseling    4) Risk of dysphagia and dehydration  PEG placed   RD following   Weekly hydration arranged     5) Psychosocial  Coping well  Met with SW  Supportive family    6) Nausea  Grade 1  Instructed on taking zofran every 8 hours on a regular schedule  Will add Zyprexa QHS  Improved some today     7) Dysphagia  Grade 1  Instructed him to swish and swallow magic mouthwash     Plan:     · Continue concurrent chemoRT   · Cycle 2 scheduled Wednesday   · Zofran 8mg q8 hours on a regular schedule  · Zyprexa QHS   · Magic mouthwash PRN; swish and swallow   · RD following   · Weekly [facility] apts for IVF     RTC Wednesday for cycle 2 of chemo    I appreciate the opportunity to participate in Mr. Bogdan Montalvo forrest.     Signed By: Dora Naylor MD

## 2019-11-20 NOTE — PROGRESS NOTES
[facility] Note: 
[id number] arrived at [facility] ambulatory and in no distress for hydration. Assessment stable, no new complaints voiced. Port used. Pt has appt at 1300 with MD 
Medications received: 
NS 1000 ml 
 
1210 Tolerated treatment well, no adverse reaction noted. D/Cd from [facility] ambulatory and in no distress accompanied by family. Next appt 11/26  1400 Visit Vitals /73 Pulse 73 Temp 97.1 °F (36.2 °C) Resp 18 No results found for this or any previous visit (from the past 12 hour(s)).

## 2019-11-22 ENCOUNTER — APPOINTMENT (OUTPATIENT)
Dept: INFUSION THERAPY | Age: 64
End: 2019-11-22
Payer: COMMERCIAL

## 2019-11-22 ENCOUNTER — DOCUMENTATION ONLY (OUTPATIENT)
Dept: ONCOLOGY | Age: 64
End: 2019-11-22

## 2019-11-22 NOTE — PROGRESS NOTES
[facility] [place] at [address], [address] [facility] [place]: [phone]  F: [phone]    Medical Nutrition Therapy      Reason for nutrition visit:  Met with patient and wife. He is having increase dysphagia making it difficult to meet nutrition needs by mouth. He has started to use the feeding tube. He reports putting his own smoothies through the tube. Encouraged him to use only Boost via Gtube to prevent clogging. Reviewed how to give the feeds: give the water before and after the formula. He can remove the plunger and use the feeding tube like a funnel. Explained that we need to start to utilize the feeding tube given inability to meet nutrition needs by mouth and weight loss of  5.72# x 1 week. Anything consumed by mouth will be merely to preserve swallow integrity. Given increased energy demands tube feeds will exceed 2000 calories. Will plan for Boost VHC 6 cans per day to provide 3180kcal, 132g protein and 954ml free water. Will need to give 120ml water flushes before and after which will provide additional 1440ml free water. Results:   Diagnosis: Squamous cell carcinoma of the right tonsil - HPV positive   Chemotherapy- cisplatin every 3 weeks - first cycle on 11/6/19  Radiation- Started on 11/6/19 and end date planned for 12/26/19     Wt Readings from Last 5 Encounters:   11/20/19 151 lb (68.5 kg)   11/13/19 151 lb 14.4 oz (68.9 kg)   11/06/19 151 lb 14.4 oz (68.9 kg)   11/06/19 151 lb 14.4 oz (68.9 kg)   10/10/19 154 lb (69.9 kg)     Radiation weights  9/27/19: 155#  11/12/19: 152. 12#  11/19/19: 146.4#         Estimated Nutrition Needs:   Calorie Range: 2380-2756kcal/day     Protein Range: 81-91g/day     Fluid Needs: 2600ml     Assessment:   Inadequate energy intake (potential) related to concurrent chemotherapy/radiation  as evidence by treatment side effects.     Plan:   -Start tube feeds: Boost VHC bolus feed 1 can 6 times a day flush 120ml water before and after.   - Home health referral through [address]    I appreciate the opportunity to participate in Mr. Matamoros Branch care.     Signed By: Agata Morales, [address], [address] [place] [address] , [address]     Contact: [phone]

## 2019-11-22 NOTE — PROGRESS NOTES
Patient now with progressive dsyphagia and weight loss of 5# x 1 week. He is unable to meet nutrition needs by mouth and tube feeds will be primary source of nutrition.

## 2019-11-25 ENCOUNTER — TELEPHONE (OUTPATIENT)
Dept: ONCOLOGY | Age: 64
End: 2019-11-25

## 2019-11-25 DIAGNOSIS — C09.9 TONSIL CANCER (HCC): ICD-10-CM

## 2019-11-25 DIAGNOSIS — C09.9 TONSIL CANCER (HCC): Primary | ICD-10-CM

## 2019-11-25 RX ORDER — MORPHINE SULFATE ORAL SOLUTION 10 MG/5ML
5 SOLUTION ORAL
Qty: 1 BOTTLE | Refills: 0 | Status: SHIPPED | OUTPATIENT
Start: 2019-11-25 | End: 2019-11-25 | Stop reason: SDUPTHER

## 2019-11-25 RX ORDER — MORPHINE SULFATE ORAL SOLUTION 10 MG/5ML
5 SOLUTION ORAL
Qty: 120 ML | Refills: 0 | Status: SHIPPED | OUTPATIENT
Start: 2019-11-25 | End: 2019-12-18 | Stop reason: SDUPTHER

## 2019-11-25 NOTE — TELEPHONE ENCOUNTER
[facility] [place] at [facility]   [address], [address], [address]   W: [phone]  F: [phone]    Medical Nutrition Therapy      Telephone Encounter:  Called and left a message with patient regarding tube feeds. Provided him with a case of Boost VHC on Friday. Submitted insurance coverage for tube feeds through [address]. [address] reached out to me that his insurance only covers the supplies not the formula. And the formula would be a $500/out of pocket expense per month. Called to discuss this with the patient and discuss options:  He has the following options:  1) pay out of pocket $500/month  2) Order boost vhc online via [employer]/etc and he would need 7 cases of month which would be about $350-400 depending on website. 3) RD can provide samples of other formulas but he would require more than 6 cans per day as they do not have as many calories. Waiting for patient to call back and discuss above items.       Signed By: Renata Prabhakar, [address] [place] [address], [address] [place] [address] , [address]     Contact: [phone]

## 2019-11-25 NOTE — TELEPHONE ENCOUNTER
Patient called and stated he would like a call from [facility] give reason      [phone]

## 2019-11-25 NOTE — TELEPHONE ENCOUNTER
Returned call to patient. States magic mouthwash is helping some but he still has increased pain in his throat. He has been taking tylenol with little relief. I will send him in liquid morphine q4 hours as needed. He understands and has no other questions/concerns at this time. Will evaluate tomorrow in office at previous scheduled appointment.

## 2019-11-26 ENCOUNTER — HOSPITAL ENCOUNTER (OUTPATIENT)
Dept: INFUSION THERAPY | Age: 64
Discharge: HOME | End: 2019-11-26
Payer: COMMERCIAL

## 2019-11-26 ENCOUNTER — OFFICE VISIT (OUTPATIENT)
Dept: ONCOLOGY | Age: 64
End: 2019-11-26

## 2019-11-26 VITALS
HEIGHT: 69 IN | TEMPERATURE: 98.2 F | OXYGEN SATURATION: 98 % | WEIGHT: 149.1 LBS | RESPIRATION RATE: 16 BRPM | HEART RATE: 103 BPM | DIASTOLIC BLOOD PRESSURE: 63 MMHG | SYSTOLIC BLOOD PRESSURE: 120 MMHG | BODY MASS INDEX: 22.08 KG/M2

## 2019-11-26 VITALS
SYSTOLIC BLOOD PRESSURE: 127 MMHG | TEMPERATURE: 97 F | HEART RATE: 103 BPM | RESPIRATION RATE: 18 BRPM | DIASTOLIC BLOOD PRESSURE: 74 MMHG

## 2019-11-26 DIAGNOSIS — C09.9 TONSIL CANCER (HCC): Primary | ICD-10-CM

## 2019-11-26 LAB
ALBUMIN SERPL-MCNC: 3.6 G/DL (ref 3.5–5)
ALBUMIN/GLOB SERPL: 1 {RATIO} (ref 1.1–2.2)
ALP SERPL-CCNC: 102 U/L (ref 45–117)
ALT SERPL-CCNC: 30 U/L (ref 12–78)
ANION GAP SERPL CALC-SCNC: 7 MMOL/L (ref 5–15)
AST SERPL-CCNC: 23 U/L (ref 15–37)
BASOPHILS # BLD: 0 K/UL (ref 0–0.1)
BASOPHILS NFR BLD: 0 % (ref 0–1)
BILIRUB SERPL-MCNC: 0.2 MG/DL (ref 0.2–1)
BUN SERPL-MCNC: 21 MG/DL (ref 6–20)
BUN/CREAT SERPL: 27 (ref 12–20)
CALCIUM SERPL-MCNC: 8.8 MG/DL (ref 8.5–10.1)
CHLORIDE SERPL-SCNC: 103 MMOL/L (ref 97–108)
CO2 SERPL-SCNC: 28 MMOL/L (ref 21–32)
CREAT SERPL-MCNC: 0.77 MG/DL (ref 0.7–1.3)
DIFFERENTIAL METHOD BLD: ABNORMAL
EOSINOPHIL # BLD: 0.1 K/UL (ref 0–0.4)
EOSINOPHIL NFR BLD: 4 % (ref 0–7)
ERYTHROCYTE [DISTWIDTH] IN BLOOD BY AUTOMATED COUNT: 16.2 % (ref 11.5–14.5)
GLOBULIN SER CALC-MCNC: 3.6 G/DL (ref 2–4)
GLUCOSE SERPL-MCNC: 116 MG/DL (ref 65–100)
HCT VFR BLD AUTO: 33.8 % (ref 36.6–50.3)
HGB BLD-MCNC: 11 G/DL (ref 12.1–17)
IMM GRANULOCYTES # BLD AUTO: 0 K/UL (ref 0–0.04)
IMM GRANULOCYTES NFR BLD AUTO: 0 % (ref 0–0.5)
LYMPHOCYTES # BLD: 0.8 K/UL (ref 0.8–3.5)
LYMPHOCYTES NFR BLD: 24 % (ref 12–49)
MAGNESIUM SERPL-MCNC: 2.2 MG/DL (ref 1.6–2.4)
MCH RBC QN AUTO: 22.7 PG (ref 26–34)
MCHC RBC AUTO-ENTMCNC: 32.5 G/DL (ref 30–36.5)
MCV RBC AUTO: 69.8 FL (ref 80–99)
MONOCYTES # BLD: 0.5 K/UL (ref 0–1)
MONOCYTES NFR BLD: 14 % (ref 5–13)
NEUTS SEG # BLD: 1.9 K/UL (ref 1.8–8)
NEUTS SEG NFR BLD: 58 % (ref 32–75)
NRBC # BLD: 0 K/UL (ref 0–0.01)
NRBC BLD-RTO: 0 PER 100 WBC
PLATELET # BLD AUTO: 296 K/UL (ref 150–400)
PMV BLD AUTO: 9.3 FL (ref 8.9–12.9)
POTASSIUM SERPL-SCNC: 3.5 MMOL/L (ref 3.5–5.1)
PROT SERPL-MCNC: 7.2 G/DL (ref 6.4–8.2)
RBC # BLD AUTO: 4.84 M/UL (ref 4.1–5.7)
RBC MORPH BLD: ABNORMAL
SODIUM SERPL-SCNC: 138 MMOL/L (ref 136–145)
WBC # BLD AUTO: 3.3 K/UL (ref 4.1–11.1)

## 2019-11-26 PROCEDURE — 36591 DRAW BLOOD OFF VENOUS DEVICE: CPT

## 2019-11-26 PROCEDURE — 77030012965 HC NDL HUBR BBMI -A

## 2019-11-26 PROCEDURE — 83735 ASSAY OF MAGNESIUM: CPT

## 2019-11-26 PROCEDURE — 36415 COLL VENOUS BLD VENIPUNCTURE: CPT

## 2019-11-26 PROCEDURE — 85025 COMPLETE CBC W/AUTO DIFF WBC: CPT

## 2019-11-26 PROCEDURE — 74011250636 HC RX REV CODE- 250/636: Performed by: INTERNAL MEDICINE

## 2019-11-26 PROCEDURE — 80053 COMPREHEN METABOLIC PANEL: CPT

## 2019-11-26 RX ORDER — SODIUM CHLORIDE 9 MG/ML
10 INJECTION INTRAMUSCULAR; INTRAVENOUS; SUBCUTANEOUS AS NEEDED
Status: DISCONTINUED | OUTPATIENT
Start: 2019-11-26 | End: 2019-11-27 | Stop reason: HOSPADM

## 2019-11-26 RX ORDER — SODIUM CHLORIDE 0.9 % (FLUSH) 0.9 %
5-10 SYRINGE (ML) INJECTION AS NEEDED
Status: DISCONTINUED | OUTPATIENT
Start: 2019-11-26 | End: 2019-11-27 | Stop reason: HOSPADM

## 2019-11-26 RX ORDER — SCOLOPAMINE TRANSDERMAL SYSTEM 1 MG/1
1 PATCH, EXTENDED RELEASE TRANSDERMAL
Qty: 6 PATCH | Refills: 2 | Status: SHIPPED | OUTPATIENT
Start: 2019-11-26 | End: 2020-11-16

## 2019-11-26 RX ORDER — METHYLPREDNISOLONE 8 MG/1
40 TABLET ORAL SEE ADMIN INSTRUCTIONS
Qty: 30 TAB | Refills: 0 | Status: ON HOLD | OUTPATIENT
Start: 2019-11-26 | End: 2022-09-22

## 2019-11-26 RX ORDER — HEPARIN 100 UNIT/ML
500 SYRINGE INTRAVENOUS AS NEEDED
Status: DISCONTINUED | OUTPATIENT
Start: 2019-11-26 | End: 2019-11-27 | Stop reason: HOSPADM

## 2019-11-26 RX ADMIN — SODIUM CHLORIDE, PRESERVATIVE FREE 500 UNITS: 5 INJECTION INTRAVENOUS at 14:25

## 2019-11-26 RX ADMIN — Medication 10 ML: at 14:25

## 2019-11-26 RX ADMIN — SODIUM CHLORIDE 10 ML: 9 INJECTION INTRAMUSCULAR; INTRAVENOUS; SUBCUTANEOUS at 14:21

## 2019-11-26 NOTE — PROGRESS NOTES
Emmy Miguel is a 60 y.o. male  Chief Complaint   Patient presents with    Follow-up     tonsil cancer      1. Have you been to the ER, urgent care clinic since your last visit? Hospitalized since your last visit? No  2. Have you seen or consulted any other health care providers outside of the [address] [place] [address] since your last visit? Include any pap smears or colon screening.  No

## 2019-11-26 NOTE — PROGRESS NOTES
[facility] [place] at [place] [address], [address]  W: [phone]  F: [phone]    Reason for Visit:   Chris Knapp is a 60 y.o. 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 Size is measuring up to 2.5 cm in short axis. Right internal jugular vein is nearly fully compressed, no contralateral adenopathy seen. · 9/5/2019-ENT exam showed very inflamed tonsil on the right side with right Epley and surface, reportedly invading the pharyngeal wall-biopsy of the right tonsil showed invasive nonkeratinizing squamous cell carcinoma moderately differentiated  · 9/2019: PET CT Right tonsillar mass with hypermetabolic right cervical lymphadenopathy  · 11/6/19: cycle 1 cisplatin + RT    History of Present Illness:   Patient is a 60 y.o. male seen for SCC of the R tonsil    3 months ago he noted a small R neck swelling. Saw Dr. Shirin Tate. Tried antibiotics for 10 days. Returned 8/9/19 and had an USG that showed adenopathy and then referred to Dr. Gabriele Chavez. This led to above mentioned diagnosis. Comes in today for follow-up and evaluation for cycle 2 of Cisplatin which is scheduled tomorrow. He has some increased soreness when he swallows but no open sores. He was given liquid morphine yesterday which he states is helping. He also has increased mucous. He has started to use his PEG tube and using 4 cans/days. He is still able to drink by mouth and eat things such as milk shakes. He has rare nausea which is resolved with zofran. He has fatigue that is relieved by rest. He has some soreness over mass that radiates to his right ear. He has no ringing in hears or no diminished hearing. He denies SOB, CP, cough, fevers/chills or bleeding. Denies numbness/tingling in extremities.  He also reports some constipation. He quit smoking 10 years ago, had smoked 1 ppd for 28 years    Sister had stage IV endometrial cancer in her 46s  Another sister  of ovarian cancer in her 62s     [address] reviewed above    [address]   Reviewed under HPI      Reviewed under HPI    Current Outpatient Medications   Medication Sig    morphine 10 mg/5 mL oral solution Take 2.5 mL by mouth every four (4) hours as needed for Pain for up to 30 days. Max Daily Amount: 30 mg.    nut tx, lact-reduced, iron (BOOST VHC) 0.09-2.25 gram-kcal/mL liqd 6 Cans by Per G Tube route daily.  OLANZapine (ZYPREXA) 10 mg tablet Take 1 Tab by mouth nightly.  baclofen (LIORESAL) 10 mg tablet Take 1 Tab by mouth three (3) times daily.  dexAMETHasone (DECADRON) 4 mg tablet Take 2 tabs (8mg) on days 2&3 of chemotherapy    aluminum-magnesium hydroxide 200-200 mg/5 mL susp 5 mL, diphenhydrAMINE 12.5 mg/5 mL liqd 12.5 mg, lidocaine 2 % soln 5 mL 5 mL by Swish and Spit route two (2) times a day. Maalox  Lidocaine 2% viscous   Diphenhydramine oral solution     Pharmacy to mix equal portions of ingredients to a total volume as indicated in the dispense amount. Swish&spit 5mL (1 tsp) 4 times daily as needed    ondansetron (ZOFRAN ODT) 8 mg disintegrating tablet Take 1 Tab by mouth every eight (8) hours as needed for Nausea.  hydroCHLOROthiazide (HYDRODIURIL) 12.5 mg tablet Take 12.5 mg by mouth daily.  omeprazole (PRILOSEC) 20 mg capsule Take 20 mg by mouth daily.  atorvastatin (LIPITOR) 20 mg tablet Take  by mouth daily.  multivitamin (ONE A DAY) tablet Take 1 Tab by mouth daily. No current facility-administered medications for this visit.       Facility-Administered Medications Ordered in Other Visits   Medication Dose Route Frequency    0.9% sodium chloride injection 10 mL  10 mL IntraVENous PRN    heparin (porcine) pf 500 Units  500 Units IntraVENous PRN    sodium chloride (NS) flush 5-10 mL  5-10 mL IntraVENous PRN      No Known Allergies     Review of Systems: A complete review of systems was obtained, negative except as described above. Physical Exam:     Visit Vitals  /63   Pulse (!) 103   Temp 98.2 °F (36.8 °C)   Resp 16   Ht 5' 9\" (1.753 m)   Wt 149 lb 1.6 oz (67.6 kg)   SpO2 98%   BMI 22.02 kg/m²     ECOG PS: 1  General: No distress  Eyes: PERRLA, anicteric sclerae  HENT: R tonsil enlarged with irregular contour, with erythema, exudates, cant appreciate the posterior pharyngeal wall, palpable left cervical nodes unchanged, OP with no sores but erythema and increased mucous noted   Neck: Supple, PAC looks good on R chest wall, not accessed   Lymphatic: palpable L cervical nodes  Respiratory: CTAB, normal respiratory effort  CV: Normal rate, regular rhythm, no murmurs, no peripheral edema  GI: Soft, nontender, nondistended, no masses, no hepatomegaly, no splenomegaly, PEG tube in place  MS: Normal gait and station. Digits without clubbing or cyanosis. Skin: No rashes, ecchymoses, or petechiae. Normal temperature, turgor, and texture. Psych: Alert, oriented, appropriate affect, normal judgment/insight    Results:     Lab Results   Component Value Date/Time    WBC 5.1 11/06/2019 08:53 AM    HGB 11.4 (L) 11/06/2019 08:53 AM    HCT 34.8 (L) 11/06/2019 08:53 AM    PLATELET 523 27/71/1327 08:53 AM    MCV 69.7 (L) 11/06/2019 08:53 AM    ABS.  NEUTROPHILS 2.2 11/06/2019 08:53 AM     Lab Results   Component Value Date/Time    Sodium 143 11/06/2019 08:53 AM    Potassium 4.3 11/06/2019 08:53 AM    Chloride 110 (H) 11/06/2019 08:53 AM    CO2 29 11/06/2019 08:53 AM    Glucose 104 (H) 11/06/2019 08:53 AM    BUN 14 11/06/2019 08:53 AM    Creatinine 0.79 11/06/2019 08:53 AM    GFR est AA >60 11/06/2019 08:53 AM    GFR est non-AA >60 11/06/2019 08:53 AM    Calcium 9.1 11/06/2019 08:53 AM     Lab Results   Component Value Date/Time    Bilirubin, total 0.3 11/06/2019 08:53 AM    ALT (SGPT) 24 11/06/2019 08:53 AM    AST (SGOT) 17 11/06/2019 08:53 AM    Alk. phosphatase 100 11/06/2019 08:53 AM    Protein, total 7.6 11/06/2019 08:53 AM    Albumin 3.8 11/06/2019 08:53 AM    Globulin 3.8 11/06/2019 08:53 AM     Records reviewed and summarized above. Pathology report(s) reviewed above. Radiology report(s) reviewed above. Assessment:   1) R tonsil SCC    CT neck and ENT exam reports reviewed  Per discussion with Dr. Yandel Potts the tonsillar mass may be involving the pharyngeal wall  CT suggests multiple ipsilateral nodes , atleast one ~3  cm and none > 6 cm  PET CT shows no distant mets  HPV positive    Clinically at least T2N1- Though could be T3N1 - HPV postive- stage I-II  He does have a h/o smoking for 35 years    Surgery not recommended by ENT due to disease extent and likelihood of poor functional outcomes    He has an otherwise excellent PS     Received cycle 1 of Cisplatin, toxicities include grade 1 nausea, grade 1 fatigue, and grade 1 mucositis (see below)    Cycle 2 due tomorrow. Labs reviewed today and stable. 2) H/O Smoking  Quit    3)  FH of Cancers  Should consider genetic counseling    4) Psychosocial  Coping well  Met with SW  Supportive family    5) Nausea  Grade 1  Instructed on taking zofran every 8 hours on a regular schedule  Zyprexa QHS  Improved some today     6) Dysphagia and mucositis   Grade 1  Instructed him to swish and swallow magic mouthwash  Liquid morphine PRN for pain   Now requiring PEG tube, RD following     7) Increased secretions  Will trial Mucinex + Scopolamine patch     8) Constipation  Will start Miralax     Plan:     · Continue concurrent chemoRT   · Proceed tomorrow with cycle 2 of Cisplatin (100mg/m2) given every 2 weeks x 3 cycles if tolerated   · Labs to include CBC with diff and CMP prior to each infusion  · Pre-meds to include Emend and Zofran.  Will switch Dexamethasone --> Methylprednisolone due to hiccups  · Methylprednisolone on day 2 & 3 of chemo  · Zofran 8mg q8 hours on a regular schedule  · Zyprexa QHS   · Magic mouthwash PRN; swish and swallow   · Liquid morphine PRN for pain  · Mucinex + Scopolamine patch for increased secretions   · RD following, continue feeds via PEG tube   · Weekly OPIC apts for IVF & OV     RTC in 1 week for IVF & symptom assessment     I appreciate the opportunity to participate in Mr. [place] Freddie clayton.     Signed By: Ana Oliva MD

## 2019-11-26 NOTE — Clinical Note
Sandra Hunter! He had hiccups with dex so I switched to methylpred for pre-med as well as home dosing, can you double check my conversion? Thanks!

## 2019-11-27 ENCOUNTER — DOCUMENTATION ONLY (OUTPATIENT)
Dept: ONCOLOGY | Age: 64
End: 2019-11-27

## 2019-11-27 ENCOUNTER — HOSPITAL ENCOUNTER (OUTPATIENT)
Dept: INFUSION THERAPY | Age: 64
Discharge: HOME | End: 2019-11-27
Payer: COMMERCIAL

## 2019-11-27 VITALS
HEIGHT: 69 IN | DIASTOLIC BLOOD PRESSURE: 66 MMHG | HEART RATE: 110 BPM | RESPIRATION RATE: 18 BRPM | BODY MASS INDEX: 21.89 KG/M2 | WEIGHT: 147.8 LBS | TEMPERATURE: 98.1 F | SYSTOLIC BLOOD PRESSURE: 136 MMHG

## 2019-11-27 DIAGNOSIS — C09.9 TONSIL CANCER (HCC): Primary | ICD-10-CM

## 2019-11-27 PROCEDURE — 96413 CHEMO IV INFUSION 1 HR: CPT

## 2019-11-27 PROCEDURE — 96415 CHEMO IV INFUSION ADDL HR: CPT

## 2019-11-27 PROCEDURE — 96367 TX/PROPH/DG ADDL SEQ IV INF: CPT

## 2019-11-27 PROCEDURE — 74011000258 HC RX REV CODE- 258: Performed by: INTERNAL MEDICINE

## 2019-11-27 PROCEDURE — 96375 TX/PRO/DX INJ NEW DRUG ADDON: CPT

## 2019-11-27 PROCEDURE — 96361 HYDRATE IV INFUSION ADD-ON: CPT

## 2019-11-27 PROCEDURE — 74011250636 HC RX REV CODE- 250/636: Performed by: INTERNAL MEDICINE

## 2019-11-27 PROCEDURE — 74011250636 HC RX REV CODE- 250/636: Performed by: REGISTERED NURSE

## 2019-11-27 RX ORDER — ONDANSETRON 2 MG/ML
8 INJECTION INTRAMUSCULAR; INTRAVENOUS ONCE
Status: COMPLETED | OUTPATIENT
Start: 2019-11-27 | End: 2019-11-27

## 2019-11-27 RX ORDER — SODIUM CHLORIDE 9 MG/ML
25 INJECTION, SOLUTION INTRAVENOUS CONTINUOUS
Status: DISPENSED | OUTPATIENT
Start: 2019-11-27 | End: 2019-11-27

## 2019-11-27 RX ORDER — SODIUM CHLORIDE 9 MG/ML
10 INJECTION INTRAMUSCULAR; INTRAVENOUS; SUBCUTANEOUS AS NEEDED
Status: ACTIVE | OUTPATIENT
Start: 2019-11-27 | End: 2019-11-27

## 2019-11-27 RX ORDER — SODIUM CHLORIDE 0.9 % (FLUSH) 0.9 %
10 SYRINGE (ML) INJECTION AS NEEDED
Status: ACTIVE | OUTPATIENT
Start: 2019-11-27 | End: 2019-11-27

## 2019-11-27 RX ORDER — HEPARIN 100 UNIT/ML
300-500 SYRINGE INTRAVENOUS AS NEEDED
Status: ACTIVE | OUTPATIENT
Start: 2019-11-27 | End: 2019-11-27

## 2019-11-27 RX ADMIN — SODIUM CHLORIDE 1000 ML: 900 INJECTION, SOLUTION INTRAVENOUS at 09:25

## 2019-11-27 RX ADMIN — POTASSIUM CHLORIDE: 2 INJECTION, SOLUTION, CONCENTRATE INTRAVENOUS at 12:15

## 2019-11-27 RX ADMIN — ONDANSETRON 8 MG: 2 INJECTION, SOLUTION INTRAMUSCULAR; INTRAVENOUS at 11:28

## 2019-11-27 RX ADMIN — METHYLPREDNISOLONE SODIUM SUCCINATE 60 MG: 125 INJECTION, POWDER, FOR SOLUTION INTRAMUSCULAR; INTRAVENOUS at 11:31

## 2019-11-27 RX ADMIN — CISPLATIN 185 MG: 100 INJECTION, SOLUTION INTRAVENOUS at 12:16

## 2019-11-27 RX ADMIN — SODIUM CHLORIDE 150 MG: 900 INJECTION, SOLUTION INTRAVENOUS at 11:33

## 2019-11-27 RX ADMIN — POTASSIUM CHLORIDE: 2 INJECTION, SOLUTION, CONCENTRATE INTRAVENOUS at 10:30

## 2019-11-27 NOTE — PROGRESS NOTES
Cancer [place] at [facility]   [address] [facility] [address], [address] [place], [place]: [phone]  F: [phone]    Medical Nutrition Therapy      Reason for nutrition visit:  Met with patient. He is having increase pain with swallowing. Unable to meet nutrition needs by mouth, started to using the feeding tube. He is taking about 2-3 Boost VHC via feeding tube which is providing 1060-1590kcal, 44-66g protein. He is also drinking about 1 Boost VHC by mouth. He is also doing milkshakes from [employer], [employer] etc.    Insurance will cover his tube feed supplies but not the formula. It is more cost effective for him to order the formula online. Provided him with several samples of Nutren 1.5 and Osmolite 1.5 which are less calories so he would need about 8 per day to meet 100% of estimated needs. The Goal is: Boost VHC 6 cans per day to provide 3180kcal, 132g protein and 954ml free water. Will need to give 120ml water flushes before and after which will provide additional 1440ml free water. Results:   Diagnosis: Squamous cell carcinoma of the right tonsil - HPV positive   Chemotherapy- cisplatin every 3 weeks - first cycle on 11/6/19  Radiation- Started on 11/6/19 and end date planned for 12/26/19     Wt Readings from Last 5 Encounters:   11/27/19 147 lb 12.8 oz (67 kg)   11/26/19 149 lb 1.6 oz (67.6 kg)   11/20/19 151 lb (68.5 kg)   11/13/19 151 lb 14.4 oz (68.9 kg)   11/06/19 151 lb 14.4 oz (68.9 kg)     Radiation weights  9/27/19: 155#  11/12/19: 152. 12#  11/19/19: 146.4#         Estimated Nutrition Needs:   Calorie Range: 2380-2756kcal/day     Protein Range: 81-91g/day     Fluid Needs: 2600ml     Assessment:   Inadequate energy intake (potential) related to concurrent chemotherapy/radiation  as evidence by treatment side effects. Plan:   Tube feeds: Boost VHC bolus feed 1 can 6 times a day flush 120ml water before and after. Or Osmolite 1.5 or Nutren 1.5 formula.       I appreciate the opportunity to participate in Mr. Anuj clayton.     Signed By: Leonie Boykin, [address] , [address]     Contact: [phone]

## 2019-11-27 NOTE — PROGRESS NOTES
[facility] Short Visit Progress Note    0915 Pt admit to [facility] for Cisplatin/C1D22 ambulatory in stable condition. Assessment completed. Port accessed yesterday, 11/26; flushed w/ positive blood return; line flushed, hydration infusing. No new concerns voiced. Patient Vitals for the past 12 hrs:   Temp Pulse Resp BP   11/27/19 0915 98.1 °F (36.7 °C) (!) 110 18 136/66        Medications:  Hydration NS 1L bolus  Pre-hydration  SoluMedrol 60 mg IVP  Emend 150 mg  Zofran 8 mg  Cisplatin  Post-hydration    1430 Pt tolerated treatment well. D/c home ambulatory in no distress. Pt aware of next appointment scheduled for 12/04/2019. Please see labs in CC from 11/26/19.

## 2019-12-02 ENCOUNTER — TELEPHONE (OUTPATIENT)
Dept: ONCOLOGY | Age: 64
End: 2019-12-02

## 2019-12-02 ENCOUNTER — HOSPITAL ENCOUNTER (OUTPATIENT)
Dept: INFUSION THERAPY | Age: 64
Discharge: HOME | End: 2019-12-02
Payer: COMMERCIAL

## 2019-12-02 VITALS
TEMPERATURE: 99.5 F | SYSTOLIC BLOOD PRESSURE: 110 MMHG | DIASTOLIC BLOOD PRESSURE: 74 MMHG | HEART RATE: 105 BPM | RESPIRATION RATE: 18 BRPM

## 2019-12-02 DIAGNOSIS — T45.1X5A CHEMOTHERAPY INDUCED NAUSEA AND VOMITING: Primary | ICD-10-CM

## 2019-12-02 DIAGNOSIS — R11.2 CHEMOTHERAPY INDUCED NAUSEA AND VOMITING: Primary | ICD-10-CM

## 2019-12-02 DIAGNOSIS — C09.9 TONSIL CANCER (HCC): Primary | ICD-10-CM

## 2019-12-02 PROCEDURE — 74011250636 HC RX REV CODE- 250/636: Performed by: REGISTERED NURSE

## 2019-12-02 PROCEDURE — 96375 TX/PRO/DX INJ NEW DRUG ADDON: CPT

## 2019-12-02 PROCEDURE — 77030012965 HC NDL HUBR BBMI -A

## 2019-12-02 PROCEDURE — 96360 HYDRATION IV INFUSION INIT: CPT

## 2019-12-02 RX ORDER — HEPARIN 100 UNIT/ML
500 SYRINGE INTRAVENOUS AS NEEDED
Status: DISCONTINUED | OUTPATIENT
Start: 2019-12-02 | End: 2019-12-03 | Stop reason: HOSPADM

## 2019-12-02 RX ORDER — LORAZEPAM 0.5 MG/1
1 TABLET ORAL
Qty: 30 TAB | Refills: 2 | Status: SHIPPED | OUTPATIENT
Start: 2019-12-02 | End: 2019-12-03

## 2019-12-02 RX ORDER — SODIUM CHLORIDE 0.9 % (FLUSH) 0.9 %
5-10 SYRINGE (ML) INJECTION AS NEEDED
Status: DISCONTINUED | OUTPATIENT
Start: 2019-12-02 | End: 2019-12-03 | Stop reason: HOSPADM

## 2019-12-02 RX ORDER — ONDANSETRON 2 MG/ML
8 INJECTION INTRAMUSCULAR; INTRAVENOUS ONCE
Status: COMPLETED | OUTPATIENT
Start: 2019-12-02 | End: 2019-12-02

## 2019-12-02 RX ADMIN — SODIUM CHLORIDE 1000 ML: 900 INJECTION, SOLUTION INTRAVENOUS at 14:08

## 2019-12-02 RX ADMIN — ONDANSETRON 8 MG: 2 INJECTION INTRAMUSCULAR; INTRAVENOUS at 14:49

## 2019-12-02 RX ADMIN — Medication 10 ML: at 15:15

## 2019-12-02 RX ADMIN — Medication 500 UNITS: at 15:15

## 2019-12-02 RX ADMIN — Medication 10 ML: at 14:05

## 2019-12-02 NOTE — TELEPHONE ENCOUNTER
Visited pt in [address] today for hydration. He is nauseated, tired, and has increased secretions. He has scopolamine patch on and using mucinex. We will add Ativan to nausea regimen and give 8mg IV zofran in [address] today with IVF. Next apt is Wednesday with office visit. He is ok with this plan. I will have RD reach out as he is having difficulty with PO intake.

## 2019-12-02 NOTE — PROGRESS NOTES
[address] [facility] @ [facility] VISIT NOTE    0320 Patient arrives for Hydration without acute problems. Please see connect care for complete assessment and education provided. All central lines follow the [facility]. Vital signs stable throughout and prior to discharge, Pt. Tolerated treatment well and discharged without incident. Patient/parent is aware of next [facility] appointment on 12/4/2019. Medications Verified by Tyler Selby RN via [employer]:  1.  NS 1l IV  2.   Zofran 8mg IV push      VITAL SIGNS   Patient Vitals for the past 12 hrs:   Temp Pulse Resp BP   12/02/19 1520 -- (!) 105 18 110/74   12/02/19 1500 99.5 °F (37.5 °C) (!) 121 18 107/68

## 2019-12-02 NOTE — TELEPHONE ENCOUNTER
Patient called stating he is very sick following last week's chemo. All weekend patient has been experiencing fatigue, weakness, and uncontrollable vomiting/diarrhea. Mr. Gale Devlin is requesting to be seen in the office today as well.       [phone]

## 2019-12-02 NOTE — TELEPHONE ENCOUNTER
Pt scheduled in peds at 1pm for IVF and IV anti-emetics, I will meet him over there to discuss side effects and nausea regimen. I called him to explain this, LVM requesting call back.

## 2019-12-03 ENCOUNTER — HOSPITAL ENCOUNTER (EMERGENCY)
Age: 64
Discharge: HOME | End: 2019-12-03
Attending: EMERGENCY MEDICINE | Admitting: EMERGENCY MEDICINE
Payer: COMMERCIAL

## 2019-12-03 ENCOUNTER — APPOINTMENT (OUTPATIENT)
Dept: CT IMAGING | Age: 64
End: 2019-12-03
Attending: EMERGENCY MEDICINE
Payer: COMMERCIAL

## 2019-12-03 VITALS
OXYGEN SATURATION: 99 % | HEART RATE: 98 BPM | BODY MASS INDEX: 21.19 KG/M2 | WEIGHT: 143.08 LBS | TEMPERATURE: 97.9 F | SYSTOLIC BLOOD PRESSURE: 129 MMHG | DIASTOLIC BLOOD PRESSURE: 75 MMHG | RESPIRATION RATE: 20 BRPM | HEIGHT: 69 IN

## 2019-12-03 DIAGNOSIS — I82.C11 INTERNAL JUGULAR (IJ) VEIN THROMBOEMBOLISM, ACUTE, RIGHT (HCC): Primary | ICD-10-CM

## 2019-12-03 DIAGNOSIS — R22.1 NECK MASS: ICD-10-CM

## 2019-12-03 LAB
ALBUMIN SERPL-MCNC: 3.3 G/DL (ref 3.5–5)
ALBUMIN/GLOB SERPL: 0.8 {RATIO} (ref 1.1–2.2)
ALP SERPL-CCNC: 100 U/L (ref 45–117)
ALT SERPL-CCNC: 50 U/L (ref 12–78)
ANION GAP SERPL CALC-SCNC: 7 MMOL/L (ref 5–15)
AST SERPL-CCNC: 22 U/L (ref 15–37)
ATRIAL RATE: 115 BPM
BASOPHILS # BLD: 0 K/UL (ref 0–0.1)
BASOPHILS NFR BLD: 0 % (ref 0–1)
BILIRUB SERPL-MCNC: 0.2 MG/DL (ref 0.2–1)
BUN SERPL-MCNC: 26 MG/DL (ref 6–20)
BUN/CREAT SERPL: 27 (ref 12–20)
CALCIUM SERPL-MCNC: 8.9 MG/DL (ref 8.5–10.1)
CALCULATED P AXIS, ECG09: 69 DEGREES
CALCULATED R AXIS, ECG10: 70 DEGREES
CALCULATED T AXIS, ECG11: 72 DEGREES
CHLORIDE SERPL-SCNC: 107 MMOL/L (ref 97–108)
CO2 SERPL-SCNC: 27 MMOL/L (ref 21–32)
COMMENT, HOLDF: NORMAL
CREAT SERPL-MCNC: 0.98 MG/DL (ref 0.7–1.3)
DIAGNOSIS, 93000: NORMAL
DIFFERENTIAL METHOD BLD: ABNORMAL
EOSINOPHIL # BLD: 0 K/UL (ref 0–0.4)
EOSINOPHIL NFR BLD: 0 % (ref 0–7)
ERYTHROCYTE [DISTWIDTH] IN BLOOD BY AUTOMATED COUNT: 16 % (ref 11.5–14.5)
GLOBULIN SER CALC-MCNC: 4.3 G/DL (ref 2–4)
GLUCOSE SERPL-MCNC: 138 MG/DL (ref 65–100)
HCT VFR BLD AUTO: 37.2 % (ref 36.6–50.3)
HGB BLD-MCNC: 12.3 G/DL (ref 12.1–17)
IMM GRANULOCYTES # BLD AUTO: 0.1 K/UL (ref 0–0.04)
IMM GRANULOCYTES NFR BLD AUTO: 1 % (ref 0–0.5)
LACTATE BLD-SCNC: 0.75 MMOL/L (ref 0.4–2)
LYMPHOCYTES # BLD: 0.5 K/UL (ref 0.8–3.5)
LYMPHOCYTES NFR BLD: 9 % (ref 12–49)
MCH RBC QN AUTO: 23 PG (ref 26–34)
MCHC RBC AUTO-ENTMCNC: 33.1 G/DL (ref 30–36.5)
MCV RBC AUTO: 69.5 FL (ref 80–99)
MONOCYTES # BLD: 1.1 K/UL (ref 0–1)
MONOCYTES NFR BLD: 20 % (ref 5–13)
NEUTS SEG # BLD: 3.9 K/UL (ref 1.8–8)
NEUTS SEG NFR BLD: 70 % (ref 32–75)
NRBC # BLD: 0 K/UL (ref 0–0.01)
NRBC BLD-RTO: 0 PER 100 WBC
P-R INTERVAL, ECG05: 134 MS
PLATELET # BLD AUTO: 285 K/UL (ref 150–400)
PMV BLD AUTO: 9.9 FL (ref 8.9–12.9)
POTASSIUM SERPL-SCNC: 3.6 MMOL/L (ref 3.5–5.1)
PROT SERPL-MCNC: 7.6 G/DL (ref 6.4–8.2)
Q-T INTERVAL, ECG07: 290 MS
QRS DURATION, ECG06: 74 MS
QTC CALCULATION (BEZET), ECG08: 401 MS
RBC # BLD AUTO: 5.35 M/UL (ref 4.1–5.7)
RBC MORPH BLD: ABNORMAL
RBC MORPH BLD: ABNORMAL
SAMPLES BEING HELD,HOLD: NORMAL
SODIUM SERPL-SCNC: 141 MMOL/L (ref 136–145)
VENTRICULAR RATE, ECG03: 115 BPM
WBC # BLD AUTO: 5.6 K/UL (ref 4.1–11.1)

## 2019-12-03 PROCEDURE — 96361 HYDRATE IV INFUSION ADD-ON: CPT

## 2019-12-03 PROCEDURE — 93005 ELECTROCARDIOGRAM TRACING: CPT

## 2019-12-03 PROCEDURE — 83605 ASSAY OF LACTIC ACID: CPT

## 2019-12-03 PROCEDURE — 74011636320 HC RX REV CODE- 636/320: Performed by: RADIOLOGY

## 2019-12-03 PROCEDURE — 74011250636 HC RX REV CODE- 250/636: Performed by: EMERGENCY MEDICINE

## 2019-12-03 PROCEDURE — 74011000258 HC RX REV CODE- 258: Performed by: RADIOLOGY

## 2019-12-03 PROCEDURE — 87040 BLOOD CULTURE FOR BACTERIA: CPT

## 2019-12-03 PROCEDURE — 85025 COMPLETE CBC W/AUTO DIFF WBC: CPT

## 2019-12-03 PROCEDURE — 99285 EMERGENCY DEPT VISIT HI MDM: CPT

## 2019-12-03 PROCEDURE — 70491 CT SOFT TISSUE NECK W/DYE: CPT

## 2019-12-03 PROCEDURE — 80053 COMPREHEN METABOLIC PANEL: CPT

## 2019-12-03 PROCEDURE — 36415 COLL VENOUS BLD VENIPUNCTURE: CPT

## 2019-12-03 PROCEDURE — 96374 THER/PROPH/DIAG INJ IV PUSH: CPT

## 2019-12-03 RX ORDER — LANOLIN ALCOHOL/MO/W.PET/CERES
1000 CREAM (GRAM) TOPICAL DAILY
COMMUNITY
End: 2020-11-16

## 2019-12-03 RX ORDER — SODIUM CHLORIDE 0.9 % (FLUSH) 0.9 %
10 SYRINGE (ML) INJECTION
Status: COMPLETED | OUTPATIENT
Start: 2019-12-03 | End: 2019-12-03

## 2019-12-03 RX ORDER — DEXAMETHASONE SODIUM PHOSPHATE 10 MG/ML
10 INJECTION INTRAMUSCULAR; INTRAVENOUS ONCE
Status: COMPLETED | OUTPATIENT
Start: 2019-12-03 | End: 2019-12-03

## 2019-12-03 RX ADMIN — Medication 10 ML: at 15:50

## 2019-12-03 RX ADMIN — SODIUM CHLORIDE 100 ML: 900 INJECTION, SOLUTION INTRAVENOUS at 15:50

## 2019-12-03 RX ADMIN — IOPAMIDOL 100 ML: 755 INJECTION, SOLUTION INTRAVENOUS at 15:50

## 2019-12-03 RX ADMIN — DEXAMETHASONE SODIUM PHOSPHATE 10 MG: 10 INJECTION INTRAMUSCULAR; INTRAVENOUS at 15:23

## 2019-12-03 RX ADMIN — SODIUM CHLORIDE 1000 ML: 900 INJECTION, SOLUTION INTRAVENOUS at 13:58

## 2019-12-03 NOTE — PROGRESS NOTES
Sent Mr. Delmis Marsh to the ED after he presented for radiation treatment today and complained of acute swelling in his right neck overnight. He is known to have an enlarged lymph node conglomerate in this area, however we perform cone beam CT scans every day for treatment alignment and his mass has been shrinking in size and measuring only half or less of the size that he presents with today. I reviewed his CT scan from yesterday and the mass was smaller than prior to treatment and significant smaller than what he presents with today. I do not think this is cancer progression or related to his radiation therapy as this is an acute change overnight. In clinic his pulse was 125 and temperature 100.4F. BP, RR, and O2 sats were within normal.     Concerned for abcess or thrombosis, I sent him to the ED for further workup and evaluation.

## 2019-12-03 NOTE — ED PROVIDER NOTES
HPI .  Patient has tonsillar cancer. His last infusion of chemo was 6 days ago. He reports having had 19 of his 35 planned radiation treatments. Patient was felt to be responding well to his treatment until he came to radiation oncology today with an acute increase in right neck swelling. He is having bilateral pain with swallowing. He has had some discomfort in his right ear. He feels weak but feels like this is from his chemotherapy. He was noted to be tachycardic with a pulse rate of 125 at radiation oncology and was found to have a fever of 100.4. Patient has seen Dr. Ebony Craven in the past for his diagnostic procedure/biopsy. Past Medical History:   Diagnosis Date    Cancer [facility])     Throat       Past Surgical History:   Procedure Laterality Date    IR INSERT GASTROSTOMY TUBE PERC  10/10/2019    IR INSERT TUNL CVC W PORT OVER 5 YEARS  10/10/2019         History reviewed. No pertinent family history.     Social History     Socioeconomic History    Marital status:      Spouse name: Not on file    Number of children: Not on file    Years of education: Not on file    Highest education level: Not on file   Occupational History    Not on file   Social Needs    Financial resource strain: Not on file    Food insecurity:     Worry: Not on file     Inability: Not on file    Transportation needs:     Medical: Not on file     Non-medical: Not on file   Tobacco Use    Smoking status: Former Smoker    Smokeless tobacco: Never Used   Substance and Sexual Activity    Alcohol use: Not on file     Comment: Socially    Drug use: Never    Sexual activity: Not on file   Lifestyle    Physical activity:     Days per week: Not on file     Minutes per session: Not on file    Stress: Not on file   Relationships    Social connections:     Talks on phone: Not on file     Gets together: Not on file     Attends Religion service: Not on file     Active member of club or organization: Not on file     Attends meetings of clubs or organizations: Not on file     Relationship status: Not on file    Intimate partner violence:     Fear of current or ex partner: Not on file     Emotionally abused: Not on file     Physically abused: Not on file     Forced sexual activity: Not on file   Other Topics Concern    Not on file   Social History Narrative    Not on file         ALLERGIES: Patient has no known allergies. Review of Systems   Constitutional: Positive for activity change and appetite change. HENT: Positive for ear pain and voice change. Negative for drooling. Respiratory: Negative for cough, chest tightness and shortness of breath. Cardiovascular: Negative for chest pain. Gastrointestinal: Negative for abdominal distention and abdominal pain. Genitourinary: Negative for difficulty urinating, dysuria and hematuria. Neurological: Positive for weakness. Psychiatric/Behavioral: Negative for agitation. The patient is nervous/anxious. Vitals:    12/03/19 1056 12/03/19 1335   BP: 122/76 111/72   Pulse: (!) 125 (!) 112   Resp: 22 20   Temp: 98.6 °F (37 °C) 98.4 °F (36.9 °C)   SpO2: 98% 97%   Weight: 64.9 kg (143 lb 1.3 oz)    Height: 5' 9\" (1.753 m)             Physical Exam  Vitals signs and nursing note reviewed. Constitutional:       Appearance: He is well-developed. HENT:      Head: Normocephalic and atraumatic. Eyes:      Pupils: Pupils are equal, round, and reactive to light. Neck:      Musculoskeletal: Normal range of motion and neck supple. Comments: Large firm nonpulsatile right anterior neck mass  Cardiovascular:      Rate and Rhythm: Normal rate and regular rhythm. Heart sounds: Normal heart sounds. No murmur. No friction rub. No gallop. Pulmonary:      Effort: Pulmonary effort is normal. No respiratory distress. Breath sounds: No wheezing or rales. Abdominal:      Palpations: Abdomen is soft. Tenderness: There is no tenderness. There is no rebound. Musculoskeletal: Normal range of motion. General: No tenderness. Skin:     Findings: No erythema. Neurological:      Mental Status: He is alert. Cranial Nerves: No cranial nerve deficit. Comments: Motor; symmetric; voice is deep   Psychiatric:         Behavior: Behavior normal.          MDM       Procedures    CONSULT NOTE:  6:10 PM Devora Mckinley DO spoke with Dr. Nilda Underwood MD. Consult for Vascular Surgery. Discussed available diagnostic tests and clinical findings. Dr. Nilda Underwood recommends starting pt on Xarelto and have pt follow up with radiation oncology. No indication of operative management at this time.

## 2019-12-03 NOTE — PROGRESS NOTES
Admission Medication Reconciliation:    Information obtained from:  Patient, chart  RxQuery data available¹:  YES    Comments/Recommendations: Updated PTA meds/reviewed patient's allergies. Patient provided information. Wife states Esequiel Handler has no idea what he takes. \"    Notes:  Magic mouthwash: patient wasn't sure whether he should be swishing and spitting or swishing and swallowing, or how often (2x daily or 4 times daily, as needed or scheduled). Spent several minutes explaining the role of magic Mouthwash in his therapy, why and how to take, strongly encouraged him to follow up with MD to ensure that he understands instructions. Reviewed \"SWISH\" technique. Cisplatin: last dose 11/27/19  Oral steroids: has prescriptions for both Medrol and Dexamethasone, confused as to which to take and why. Again, urged him to follow up with MD to ensure that he understands instructions  Peg tube: can take meds orally and eat, feeding tube us supplemental  Scopolamine patch due to be replaced today  Baclofen is taken prn hiccups  Boost for tube feeds at this time  Biotene: not sure whether he should be using with Magic Mouthwash, again, encouraged him to follow up with MD    Medication changes (since last review): Added  Cyanocobalamin    Adjusted  Baclofen to PRN    Removed  1. Olanzapine (states he does not take)  2. Lorazepam (as above)    Thank you for allowing me to participate in the care of your patient. Marques Ferrer PharmD, RN #[id number] benefit data reflects medications filled and processed through the patient's insurance, however   this data does NOT capture whether the medication was picked up or is currently being taken by the patient. Allergies:  Patient has no known allergies.     Significant PMH/Disease States:   Past Medical History:   Diagnosis Date    Cancer [facility])     Throat     Chief Complaint for this Admission:    Chief Complaint   Patient presents with    Referral / Consult    Neck Swelling     Prior to Admission Medications:   Prior to Admission Medications   Prescriptions Last Dose Informant Patient Reported? Taking?   aluminum-magnesium hydroxide 200-200 mg/5 mL susp 5 mL, diphenhydrAMINE 12.5 mg/5 mL liqd 12.5 mg, lidocaine 2 % soln 5 mL 12/3/2019 at Unknown time  No Yes   Si mL by Swish and Spit route two (2) times a day. Maalox  Lidocaine 2% viscous   Diphenhydramine oral solution     Pharmacy to mix equal portions of ingredients to a total volume as indicated in the dispense amount. Swish&spit 5mL (1 tsp) 4 times daily as needed   atorvastatin (LIPITOR) 20 mg tablet 2019 at Unknown time  Yes Yes   Sig: Take  by mouth daily. baclofen (LIORESAL) 10 mg tablet 2019 at Unknown time  No Yes   Sig: Take 1 Tab by mouth three (3) times daily. Patient taking differently: Take 10 mg by mouth three (3) times daily as needed for Other (Hiccups). cyanocobalamin 1,000 mcg tablet 2019 at Unknown time  Yes Yes   Sig: Take 1,000 mcg by mouth daily. hydroCHLOROthiazide (HYDRODIURIL) 12.5 mg tablet 2019 at Unknown time  Yes Yes   Sig: Take 12.5 mg by mouth daily. methylPREDNISolone (MEDROL) 8 mg tablet 2019 at Unknown time  No Yes   Sig: Take 5 Tabs by mouth See Admin Instructions. Take 5 tabs by mouth (40mg) on days 2&3 of chemotherapy   morphine 10 mg/5 mL oral solution 2019 at Unknown time  No Yes   Sig: Take 2.5 mL by mouth every four (4) hours as needed for Pain for up to 30 days. Max Daily Amount: 30 mg.   multivitamin (ONE A DAY) tablet 2019 at Unknown time  Yes Yes   Sig: Take 1 Tab by mouth daily. nut tx, lact-reduced, iron (BOOST VHC) 0.09-2.25 gram-kcal/mL liqd 12/3/2019 at Unknown time  No Yes   Si Cans by Per G Tube route daily. omeprazole (PRILOSEC) 20 mg capsule 2019 at Unknown time  Yes Yes   Sig: Take 20 mg by mouth daily.    ondansetron (ZOFRAN ODT) 8 mg disintegrating tablet   No Yes   Sig: Take 1 Tab by mouth every eight (8) hours as needed for Nausea. saliva substitution (BIOTENE DRY MOUTH ORAL RINSE) mwsh mouthwash   Yes Yes   Sig: 15 mL by Mucous Membrane route as needed. scopolamine (TRANSDERM-SCOP) 1 mg over 3 days pt3d   No Yes   Si Patch by TransDERmal route every seventy-two (72) hours. Facility-Administered Medications: None       Please contact the main inpatient pharmacy with any questions or concerns at [phone] and we will direct you to the clinical pharmacist covering this patient's care while in-house.    Ángel Orourke

## 2019-12-03 NOTE — ED NOTES
Patient discharged home by Dr. Edy Shi. Prescription, discharge instructions and [employer]  coupon card given. Patient and family verbalized understanding of discharge instructions.

## 2019-12-03 NOTE — ED TRIAGE NOTES
Triage Note: Patient is coming in for neck swelling and fever. Patient is on chemo and radiation for throat cancer. Patient states pain has been consistent during the 5 weeks of radiation and chemo. Mask placed on patient.

## 2019-12-03 NOTE — ED NOTES
Per radiology patient has clot in his internal jugular vein     Notified by apolonia.  Dr. Hayden Ramos made aware

## 2019-12-03 NOTE — TELEPHONE ENCOUNTER
Called  Jose M Manav this morning. He reports increased discomfort in his throat and unable to eat by mouth. Reinforced importance of using feeding tube, he should be aiming for 6 cans of Boost VHC or 8 cans of any other product. He reports feeling weak, explained that the supplements through the feeding tube will help provide him with energy and fuel. He may benefit from gravity bags for tube feeds. Will have some available for him to try.

## 2019-12-03 NOTE — ED NOTES
Care assumed from Dr. Iesha Puente at 3:15 PM.  Please see his note for full H&P.  72-year-old male with a history of tonsillar cancer currently undergoing radiation with rapid increase in the size of his neck mass. Referred to the emergency department by his radiation oncologist for further evaluation and CT. At time of signout awaiting CT results and plan to consult ENT, Dr. Raul Steve for further evaluation. CT scan shows no abscess or liquifaction but does show right IJ thrombus, with concern for vascular congestion surrounding with associated stranding, and concern that this is the etiology for his increased swelling. ENT consult was cancelled. 5:15 PM discussed with the patient's radiation oncologist Dr. Zan Adames and notified him of his scan results. He agrees with plan for vascular surgery consultation and defers to their management plan for this thrombus. He does state that in October during his planning scan he did have a small amount of IJ thrombus present but was not occlusive at that time. CONSULT NOTE:  6:10 PM Blas Gillespie DO spoke with Dr. Ana Cardoza MD. Consult for Vascular Surgery. Discussed available diagnostic tests and clinical findings. Dr. Ana Cardoza recommends starting pt on Xarelto, as for any DVT, and have pt follow up with radiation oncology as an outpatient. No indication of operative management at this time.

## 2019-12-04 ENCOUNTER — HOSPITAL ENCOUNTER (OUTPATIENT)
Dept: INFUSION THERAPY | Age: 64
Discharge: HOME | End: 2019-12-04
Payer: COMMERCIAL

## 2019-12-04 ENCOUNTER — OFFICE VISIT (OUTPATIENT)
Dept: ONCOLOGY | Age: 64
End: 2019-12-04

## 2019-12-04 VITALS
DIASTOLIC BLOOD PRESSURE: 68 MMHG | HEART RATE: 100 BPM | BODY MASS INDEX: 21.8 KG/M2 | HEIGHT: 69 IN | SYSTOLIC BLOOD PRESSURE: 117 MMHG | WEIGHT: 147.2 LBS | TEMPERATURE: 98 F | OXYGEN SATURATION: 99 %

## 2019-12-04 VITALS
HEART RATE: 101 BPM | DIASTOLIC BLOOD PRESSURE: 77 MMHG | SYSTOLIC BLOOD PRESSURE: 124 MMHG | RESPIRATION RATE: 18 BRPM | TEMPERATURE: 98 F

## 2019-12-04 DIAGNOSIS — C09.9 TONSIL CANCER (HCC): Primary | ICD-10-CM

## 2019-12-04 DIAGNOSIS — B37.0 ORAL THRUSH: ICD-10-CM

## 2019-12-04 PROCEDURE — 96375 TX/PRO/DX INJ NEW DRUG ADDON: CPT

## 2019-12-04 PROCEDURE — 96360 HYDRATION IV INFUSION INIT: CPT

## 2019-12-04 PROCEDURE — 74011000250 HC RX REV CODE- 250: Performed by: INTERNAL MEDICINE

## 2019-12-04 PROCEDURE — 74011250636 HC RX REV CODE- 250/636: Performed by: REGISTERED NURSE

## 2019-12-04 PROCEDURE — 77030012965 HC NDL HUBR BBMI -A

## 2019-12-04 PROCEDURE — 74011250636 HC RX REV CODE- 250/636: Performed by: INTERNAL MEDICINE

## 2019-12-04 RX ORDER — FLUCONAZOLE 200 MG/1
200 TABLET ORAL DAILY
Qty: 14 TAB | Refills: 0 | Status: SHIPPED | OUTPATIENT
Start: 2019-12-04 | End: 2019-12-18

## 2019-12-04 RX ORDER — PANTOPRAZOLE SODIUM 40 MG/1
40 TABLET, DELAYED RELEASE ORAL DAILY
Qty: 30 TAB | Refills: 2 | Status: SHIPPED | OUTPATIENT
Start: 2019-12-04 | End: 2020-03-10 | Stop reason: SDUPTHER

## 2019-12-04 RX ORDER — HEPARIN 100 UNIT/ML
500 SYRINGE INTRAVENOUS AS NEEDED
Status: DISCONTINUED | OUTPATIENT
Start: 2019-12-04 | End: 2019-12-08 | Stop reason: HOSPADM

## 2019-12-04 RX ORDER — SODIUM CHLORIDE 9 MG/ML
10 INJECTION INTRAMUSCULAR; INTRAVENOUS; SUBCUTANEOUS AS NEEDED
Status: DISCONTINUED | OUTPATIENT
Start: 2019-12-04 | End: 2019-12-05 | Stop reason: HOSPADM

## 2019-12-04 RX ORDER — FAMOTIDINE 10 MG/ML
20 INJECTION INTRAVENOUS ONCE
Status: COMPLETED | OUTPATIENT
Start: 2019-12-04 | End: 2019-12-04

## 2019-12-04 RX ORDER — SODIUM CHLORIDE 0.9 % (FLUSH) 0.9 %
10 SYRINGE (ML) INJECTION AS NEEDED
Status: DISCONTINUED | OUTPATIENT
Start: 2019-12-04 | End: 2019-12-05 | Stop reason: HOSPADM

## 2019-12-04 RX ADMIN — SODIUM CHLORIDE 10 ML: 9 INJECTION INTRAMUSCULAR; INTRAVENOUS; SUBCUTANEOUS at 11:40

## 2019-12-04 RX ADMIN — FAMOTIDINE 20 MG: 10 INJECTION INTRAVENOUS at 12:21

## 2019-12-04 RX ADMIN — SODIUM CHLORIDE 1000 ML: 900 INJECTION, SOLUTION INTRAVENOUS at 11:42

## 2019-12-04 RX ADMIN — Medication 10 ML: at 12:47

## 2019-12-04 RX ADMIN — Medication 500 UNITS: at 12:48

## 2019-12-04 NOTE — PROGRESS NOTES
[facility] at [address], [id number] [place] [address] Road, [address] [place] [address]  W: [phone]  F: [phone]    Reason for Visit:   Burak Talbert is a 60 y.o. 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 Size is measuring up to 2.5 cm in short axis. Right internal jugular vein is nearly fully compressed, no contralateral adenopathy seen. · 9/5/2019-ENT exam showed very inflamed tonsil on the right side with right Epley and surface, reportedly invading the pharyngeal wall-biopsy of the right tonsil showed invasive nonkeratinizing squamous cell carcinoma moderately differentiated  · 9/2019: PET CT Right tonsillar mass with hypermetabolic right cervical lymphadenopathy  · 11/6/19: cycle 1 cisplatin + RT    History of Present Illness:   Patient is a 60 y.o. male seen for SCC of the R tonsil    3 months ago he noted a small R neck swelling. Saw Dr. Alexandra Skinner. Tried antibiotics for 10 days. Returned 8/9/19 and had an USG that showed adenopathy and then referred to Dr. Kimberley Aguilar. This led to above mentioned diagnosis. Comes in today for follow-up. He was in the ER yesterday with right neck swelling and found to have a thrombus in his right jugular vein. He was started on Xarelto and took first dose this morning. He already believes swelling has decreased. His nausea is better controlled now with current regimen. He has increased secretions. His pain and dysphagia is managed with magic mouthwash and liquid morphine however he prefers not to use this frequently. He denies fevers/chills, SOB, CP, cough, or bleeding. Denies numbness/tingling in extremities. Has had intermittent diarrhea but this has improved since starting Imodium. Had no episodes yesterday and 1 episode today.  He is using PEG tube and about 8 cans/day. He quit smoking 10 years ago, had smoked 1 ppd for 28 years    Sister had stage IV endometrial cancer in her 46s  Another sister  of ovarian cancer in her 62s     [address] and [address] reviewed above    [address]   Reviewed under HPI      Reviewed under HPI    Current Outpatient Medications   Medication Sig    cyanocobalamin 1,000 mcg tablet Take 1,000 mcg by mouth daily.  rivaroxaban (XARELTO) 15 mg (42)- 20 mg (9) DsPk Take one 15 mg tablet twice a day with food for the first 21 days. Then, take one 20 mg tablet once a day with food for 9 days.  scopolamine (TRANSDERM-SCOP) 1 mg over 3 days pt3d 1 Patch by TransDERmal route every seventy-two (72) hours.  methylPREDNISolone (MEDROL) 8 mg tablet Take 5 Tabs by mouth See Admin Instructions. Take 5 tabs by mouth (40mg) on days 2&3 of chemotherapy    morphine 10 mg/5 mL oral solution Take 2.5 mL by mouth every four (4) hours as needed for Pain for up to 30 days. Max Daily Amount: 30 mg.    nut tx, lact-reduced, iron (BOOST VHC) 0.09-2.25 gram-kcal/mL liqd 6 Cans by Per G Tube route daily.  aluminum-magnesium hydroxide 200-200 mg/5 mL susp 5 mL, diphenhydrAMINE 12.5 mg/5 mL liqd 12.5 mg, lidocaine 2 % soln 5 mL 5 mL by Swish and Spit route two (2) times a day. Maalox  Lidocaine 2% viscous   Diphenhydramine oral solution     Pharmacy to mix equal portions of ingredients to a total volume as indicated in the dispense amount. Swish&spit 5mL (1 tsp) 4 times daily as needed    hydroCHLOROthiazide (HYDRODIURIL) 12.5 mg tablet Take 12.5 mg by mouth daily.  omeprazole (PRILOSEC) 20 mg capsule Take 20 mg by mouth daily.  atorvastatin (LIPITOR) 20 mg tablet Take  by mouth daily.  multivitamin (ONE A DAY) tablet Take 1 Tab by mouth daily.  saliva substitution (BIOTENE DRY MOUTH ORAL RINSE) mwsh mouthwash 15 mL by Mucous Membrane route as needed.  baclofen (LIORESAL) 10 mg tablet Take 1 Tab by mouth three (3) times daily.  (Patient taking differently: Take 10 mg by mouth three (3) times daily as needed for Other (Hiccups). )    ondansetron (ZOFRAN ODT) 8 mg disintegrating tablet Take 1 Tab by mouth every eight (8) hours as needed for Nausea. No current facility-administered medications for this visit. Facility-Administered Medications Ordered in Other Visits   Medication Dose Route Frequency    sodium chloride (NS) flush 10 mL  10 mL IntraVENous PRN    heparin (porcine) pf 500 Units  500 Units InterCATHeter PRN    0.9% sodium chloride injection 10 mL  10 mL IntraVENous PRN      No Known Allergies     Review of Systems: A complete review of systems was obtained, negative except as described above. Physical Exam:     Visit Vitals  /68 (BP 1 Location: Left arm, BP Patient Position: Sitting)   Pulse 100   Temp 98 °F (36.7 °C) (Oral)   Ht 5' 9\" (1.753 m)   Wt 147 lb 3.2 oz (66.8 kg)   SpO2 99%   BMI 21.74 kg/m²     ECOG PS: 1  General: No distress  Eyes: PERRLA, anicteric sclerae  HENT: R tonsil enlarged with irregular contour, with erythema, exudates, cant appreciate the posterior pharyngeal wall, palpable left cervical nodes unchanged, OP erythema and oral thrush noted on back of throat, large firm area of swelling below jaw line on right side d/t thrombus   Neck: Supple, PAC looks good on R chest wall, not accessed   Lymphatic: palpable L cervical nodes  Respiratory: CTAB, normal respiratory effort  CV: Normal rate, regular rhythm, no murmurs, no peripheral edema  GI: Soft, nontender, nondistended, no masses, no hepatomegaly, no splenomegaly, PEG tube in place  MS: Normal gait and station. Digits without clubbing or cyanosis. Skin: No rashes, ecchymoses, or petechiae. Normal temperature, turgor, and texture.   Psych: Alert, oriented, appropriate affect, normal judgment/insight    Results:     Lab Results   Component Value Date/Time    WBC 5.6 12/03/2019 11:49 AM    HGB 12.3 12/03/2019 11:49 AM    HCT 37.2 12/03/2019 11:49 AM PLATELET 907 04/34/0811 11:49 AM    MCV 69.5 (L) 12/03/2019 11:49 AM    ABS. NEUTROPHILS 3.9 12/03/2019 11:49 AM     Lab Results   Component Value Date/Time    Sodium 141 12/03/2019 11:49 AM    Potassium 3.6 12/03/2019 11:49 AM    Chloride 107 12/03/2019 11:49 AM    CO2 27 12/03/2019 11:49 AM    Glucose 138 (H) 12/03/2019 11:49 AM    BUN 26 (H) 12/03/2019 11:49 AM    Creatinine 0.98 12/03/2019 11:49 AM    GFR est AA >60 12/03/2019 11:49 AM    GFR est non-AA >60 12/03/2019 11:49 AM    Calcium 8.9 12/03/2019 11:49 AM     Lab Results   Component Value Date/Time    Bilirubin, total 0.2 12/03/2019 11:49 AM    ALT (SGPT) 50 12/03/2019 11:49 AM    AST (SGOT) 22 12/03/2019 11:49 AM    Alk. phosphatase 100 12/03/2019 11:49 AM    Protein, total 7.6 12/03/2019 11:49 AM    Albumin 3.3 (L) 12/03/2019 11:49 AM    Globulin 4.3 (H) 12/03/2019 11:49 AM     Records reviewed and summarized above. Pathology report(s) reviewed above. Radiology report(s) reviewed above. CT neck 12/3/19: IMPRESSION:   1. Extensive right cervical lymphadenopathy as previously described, with a  very slight interval decrease in size but continued heterogeneous enhancement  consistent with necrosis. There is no abscess formation or liquefaction,  however. 2. Stranding of surrounding fat which may be related to treatment and or venous  congestion.   3. Intraluminal thrombus in the right internal jugular vein, new since the prior  study    Assessment:   1) R tonsil SCC    CT neck and ENT exam reports reviewed  Per discussion with Dr. Jackie Mckinley the tonsillar mass may be involving the pharyngeal wall  CT suggests multiple ipsilateral nodes , atleast one ~3  cm and none > 6 cm  PET CT shows no distant mets  HPV positive    Clinically at least T2N1- Though could be T3N1 - HPV postive- stage I-II  He does have a h/o smoking for 35 years    Surgery not recommended by ENT due to disease extent and likelihood of poor functional outcomes    He has an otherwise excellent PS     S/p 2 cycles of Cisplatin, toxicities include grade 1-2 nausea, grade 1 fatigue, and grade 2 mucositis (see below)    CT of neck obtained yesterday in ER 12/3 which showed very slight interval decrease in size of right cervical lymphadenopathy as well as a right internal jugular vein thrombus (see below)     Pt unsure if he is willing to receive cycle 3 of Cisplatin however will continue to evaluate with weekly hydration     IVF hydration given today, labs stable     2) H/O Smoking  Quit    3)  FH of Cancers  Should consider genetic counseling    4) Psychosocial  Coping well  Met with SW  Supportive family    5) Nausea  Grade 1 today  Zofran every 8 hours on a regular schedule  Zyprexa QHS  Ativan PRN for breakthrough     6) Dysphagia and mucositis and oral thrush   Grade 2  Instructed him to swish and swallow magic mouthwash  Liquid morphine PRN for pain   Now requiring PEG tube, RD following; 8 cans/day   Fluconazole sent to pharmacy on file for oral thrush (no interactions with Xarelto)     7) Increased secretions  Mucinex + Scopolamine patch     8) Diarrhea  Grade 1 but improving  Imodium PRN    9) Right IJ thrombus  On Xarelto and will need this for 3 months     Plan:     · Continue concurrent chemoRT   · Cycle 3 Cisplatin 12/18 however pt unsure if he will proceed   · Weekly IVF + Labs + OV   · Zofran 8mg q8 hours on a regular schedule  · Zyprexa QHS   · Ativan 0.5mg q6 hours PRN breakthrough, not past 6pm due to Zyprexa QHS   · Magic mouthwash PRN; swish and swallow   · Liquid morphine PRN for pain  · Mucinex + Scopolamine patch for increased secretions   · RD following, continue feeds via PEG tube, 8 cans/day is goal   · Weekly [facility] apts for IVF & OV   · Fluconazole sent to pharmacy on file for oral thrush   · Continue Xarelto x 3 months     RTC in 1 week for IVF & symptom assessment   Seen in conjunction with Onur Perez NP    I appreciate the opportunity to participate in Mr. Breanna Hodge Shakira's care.     Signed By: Ana Oliva MD

## 2019-12-04 NOTE — PROGRESS NOTES
[facility] VISIT NOTE     1130 Patient arrives for IV Hydration therapy via Port without acute problems. Please see connect care for complete assessment and education provided. Vital signs stable throughout and prior to discharge, Pt. Tolerated treatment well and discharged without incident. Patient is aware of next [facility] appointment on 12/18/2019. Appointment card given to patient. Medications Verified by Nimco Moya RN via [employer]:  1. NS IV Bolus over 1 hour  2. Pepcid 20 mg IVP  4. NS IV flush via Port  5.  Heparin 500 units IV flush via [address]  Patient Vitals for the past 12 hrs:   Temp Pulse Resp BP   12/04/19 1249 98 °F (36.7 °C) (!) 101 18 124/77   12/04/19 1136 98.6 °F (37 °C) 81 18 92/59

## 2019-12-04 NOTE — PROGRESS NOTES
Shelly Pineda is a 60 y.o. 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 Have you been to the ER, urgent care clinic since your last visit? Hospitalized since your last visit? No.    2. Have you seen or consulted any other health care providers outside of the [address] [place] [address] since your last visit? Include any pap smears or colon screening.  No.

## 2019-12-05 ENCOUNTER — DOCUMENTATION ONLY (OUTPATIENT)
Dept: ONCOLOGY | Age: 64
End: 2019-12-05

## 2019-12-05 DIAGNOSIS — R50.9 FEVER, UNSPECIFIED FEVER CAUSE: Primary | ICD-10-CM

## 2019-12-05 RX ORDER — AMOXICILLIN AND CLAVULANATE POTASSIUM 875; 125 MG/1; MG/1
1 TABLET, FILM COATED ORAL EVERY 12 HOURS
Qty: 14 TAB | Refills: 0 | Status: SHIPPED | OUTPATIENT
Start: 2019-12-05 | End: 2019-12-12

## 2019-12-05 RX ORDER — NYSTATIN 100000 [USP'U]/ML
1 SUSPENSION ORAL 4 TIMES DAILY
Qty: 500 ML | Refills: 3 | OUTPATIENT
Start: 2019-12-05 | End: 2020-04-15

## 2019-12-05 NOTE — PROGRESS NOTES
[facility] [place] at [address], [address] [facility] [address]  [address] 103: [phone]  F: [phone]    Medical Nutrition Therapy      Reason for nutrition visit:  Met with patient and wife. He is having increased mouth pain, was told he has yeast in his throat. Started on diflucan yesterday but has not picked it up yet. Will also try nystatin rinse to swish and swallow. He is struggling to get enough fluid and nutrition stating, he has so many appointments it's hard to get it all in. He is averaging 3 cans of Boost VHC which is providing 1590kcal, 66g protein. The Goal is: Boost VHC 6 cans per day to provide 3180kcal, 132g protein and 954ml free water. Will need to give 120ml water flushes before and after which will provide additional 1440ml free water. He has been trying to do some arby shakes by mouth. He reports having a fever. Temperature checked at radiation and was 101.6, NP notified and antibiotics sent in. 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 12#  11/19/19: 146.4#       Estimated Nutrition Needs:   Calorie Range: 2380-2756kcal/day     Protein Range: 81-91g/day     Fluid Needs: 2600ml     Assessment:   Inadequate energy intake (potential) related to concurrent chemotherapy/radiation  as evidence by treatment side effects. Plan:   Tube feeds: Boost VHC bolus feed 1 can 6 times a day flush 120ml water before and after. Or Osmolite 1.5 or Nutren 1.5 formula.     - Provided him with gravity bags and explained how to use the gravity feeds.  Suggest that he put 1 can of formula and about 4oz of water in gravity bag and allow it to infuse over 20-30 mins. I appreciate the opportunity to participate in Mr. Negar Cabrera forrest.     Signed By: Gabrielle Apgar, [address] [place] [address], [address] [place] [address] , [address]     Contact: [phone]

## 2019-12-06 ENCOUNTER — TELEPHONE (OUTPATIENT)
Dept: ONCOLOGY | Age: 64
End: 2019-12-06

## 2019-12-06 NOTE — TELEPHONE ENCOUNTER
[facility] [place] at [address], [address] [facility] [address]  Randee Geelaurelrahat 103: [phone]  F: [phone]    Medical Nutrition Therapy      Reason for nutrition visit:  Called patient to check and see if he tried to use the gravity bags. No answer, left a message.       Signed By: Leena Pan, [address] [place] [address], [address] [place] [address] , [address]     Contact: [phone]

## 2019-12-08 LAB
BACTERIA SPEC CULT: NORMAL
SERVICE CMNT-IMP: NORMAL

## 2019-12-11 ENCOUNTER — TELEPHONE (OUTPATIENT)
Dept: ONCOLOGY | Age: 64
End: 2019-12-11

## 2019-12-11 NOTE — TELEPHONE ENCOUNTER
Called patient as he has not checked into [facility] making sure he was still coming for apt. No answer, LVM requesting call back.

## 2019-12-13 ENCOUNTER — DOCUMENTATION ONLY (OUTPATIENT)
Dept: ONCOLOGY | Age: 64
End: 2019-12-13

## 2019-12-13 NOTE — PROGRESS NOTES
12/13/2019    1330: called patient to assess; patient answered and HIPPA verified x2 identifiers   Patient stated after last radiation appointment he was tired and had forgotten about appointment   Asked patient if possible to schedule follow-up after hydration appointment on 12/18/2019; patient agreed with plan and stated he would arrive after his radiation appointment (scheduled for 10AM 12/18/)     Will schedule patient accordingly   Patient stated having cough; patient has taken musinex with some relief but cough worsens at night     Will notify STERLING Nielsen     No new questions or concerns at this time

## 2019-12-13 NOTE — PROGRESS NOTES
Pt did not show for visit last week. I called him and he never returned my call. Please call him and see if he is ok. We would like to see him. His next hydration is 12/18/19.

## 2019-12-18 ENCOUNTER — HOSPITAL ENCOUNTER (OUTPATIENT)
Dept: INFUSION THERAPY | Age: 64
Discharge: HOME | End: 2019-12-18
Payer: COMMERCIAL

## 2019-12-18 VITALS
DIASTOLIC BLOOD PRESSURE: 67 MMHG | SYSTOLIC BLOOD PRESSURE: 115 MMHG | TEMPERATURE: 98 F | RESPIRATION RATE: 18 BRPM | HEART RATE: 100 BPM

## 2019-12-18 DIAGNOSIS — C09.9 TONSIL CANCER (HCC): ICD-10-CM

## 2019-12-18 LAB
ALBUMIN SERPL-MCNC: 3.2 G/DL (ref 3.5–5)
ALBUMIN/GLOB SERPL: 0.7 {RATIO} (ref 1.1–2.2)
ALP SERPL-CCNC: 107 U/L (ref 45–117)
ALT SERPL-CCNC: 34 U/L (ref 12–78)
ANION GAP SERPL CALC-SCNC: 6 MMOL/L (ref 5–15)
AST SERPL-CCNC: 20 U/L (ref 15–37)
BASOPHILS # BLD: 0 K/UL (ref 0–0.1)
BASOPHILS NFR BLD: 0 % (ref 0–1)
BILIRUB SERPL-MCNC: 0.3 MG/DL (ref 0.2–1)
BUN SERPL-MCNC: 18 MG/DL (ref 6–20)
BUN/CREAT SERPL: 20 (ref 12–20)
CALCIUM SERPL-MCNC: 8.9 MG/DL (ref 8.5–10.1)
CHLORIDE SERPL-SCNC: 105 MMOL/L (ref 97–108)
CO2 SERPL-SCNC: 26 MMOL/L (ref 21–32)
CREAT SERPL-MCNC: 0.88 MG/DL (ref 0.7–1.3)
DIFFERENTIAL METHOD BLD: ABNORMAL
EOSINOPHIL # BLD: 0 K/UL (ref 0–0.4)
EOSINOPHIL NFR BLD: 0 % (ref 0–7)
ERYTHROCYTE [DISTWIDTH] IN BLOOD BY AUTOMATED COUNT: 16.1 % (ref 11.5–14.5)
GLOBULIN SER CALC-MCNC: 4.3 G/DL (ref 2–4)
GLUCOSE SERPL-MCNC: 150 MG/DL (ref 65–100)
HCT VFR BLD AUTO: 29.9 % (ref 36.6–50.3)
HGB BLD-MCNC: 9.7 G/DL (ref 12.1–17)
IMM GRANULOCYTES # BLD AUTO: 0 K/UL
IMM GRANULOCYTES NFR BLD AUTO: 0 %
LYMPHOCYTES # BLD: 0.3 K/UL (ref 0.8–3.5)
LYMPHOCYTES NFR BLD: 10 % (ref 12–49)
MAGNESIUM SERPL-MCNC: 2 MG/DL (ref 1.6–2.4)
MCH RBC QN AUTO: 22.9 PG (ref 26–34)
MCHC RBC AUTO-ENTMCNC: 32.4 G/DL (ref 30–36.5)
MCV RBC AUTO: 70.7 FL (ref 80–99)
MONOCYTES # BLD: 0.3 K/UL (ref 0–1)
MONOCYTES NFR BLD: 10 % (ref 5–13)
NEUTS SEG # BLD: 2.6 K/UL (ref 1.8–8)
NEUTS SEG NFR BLD: 80 % (ref 32–75)
NRBC # BLD: 0 K/UL (ref 0–0.01)
NRBC BLD-RTO: 0 PER 100 WBC
PLATELET # BLD AUTO: 279 K/UL (ref 150–400)
PMV BLD AUTO: 9.8 FL (ref 8.9–12.9)
POTASSIUM SERPL-SCNC: 4 MMOL/L (ref 3.5–5.1)
PROT SERPL-MCNC: 7.5 G/DL (ref 6.4–8.2)
RBC # BLD AUTO: 4.23 M/UL (ref 4.1–5.7)
RBC MORPH BLD: ABNORMAL
SODIUM SERPL-SCNC: 137 MMOL/L (ref 136–145)
WBC # BLD AUTO: 3.2 K/UL (ref 4.1–11.1)

## 2019-12-18 PROCEDURE — 96360 HYDRATION IV INFUSION INIT: CPT

## 2019-12-18 PROCEDURE — 80053 COMPREHEN METABOLIC PANEL: CPT

## 2019-12-18 PROCEDURE — 36415 COLL VENOUS BLD VENIPUNCTURE: CPT

## 2019-12-18 PROCEDURE — 77030012965 HC NDL HUBR BBMI -A

## 2019-12-18 PROCEDURE — 85025 COMPLETE CBC W/AUTO DIFF WBC: CPT

## 2019-12-18 PROCEDURE — 83735 ASSAY OF MAGNESIUM: CPT

## 2019-12-18 PROCEDURE — 74011250636 HC RX REV CODE- 250/636: Performed by: REGISTERED NURSE

## 2019-12-18 RX ORDER — MORPHINE SULFATE ORAL SOLUTION 10 MG/5ML
5 SOLUTION ORAL
Qty: 120 ML | Refills: 0 | Status: SHIPPED | OUTPATIENT
Start: 2019-12-18 | End: 2020-01-17

## 2019-12-18 RX ADMIN — SODIUM CHLORIDE 1000 ML: 900 INJECTION, SOLUTION INTRAVENOUS at 10:47

## 2019-12-18 NOTE — PROGRESS NOTES
[facility] [place] at [place] [address], [address]  W: [phone]  F: [phone]    Reason for Visit:   Astrid Jorge is a 60 y.o. 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 Size is measuring up to 2.5 cm in short axis. Right internal jugular vein is nearly fully compressed, no contralateral adenopathy seen. · 2019-ENT exam showed very inflamed tonsil on the right side with right Epley and surface, reportedly invading the pharyngeal wall-biopsy of the right tonsil showed invasive nonkeratinizing squamous cell carcinoma moderately differentiated  · 2019: PET CT Right tonsillar mass with hypermetabolic right cervical lymphadenopathy  · 19: cycle 1 cisplatin + RT    History of Present Illness:   Patient is a 60 y.o. male seen for SCC of the R tonsil    3 months ago he noted a small R neck swelling. Saw Dr. Libra Rosales. Tried antibiotics for 10 days. Returned 19 and had an USG that showed adenopathy and then referred to Dr. Kay Nolan. This led to above mentioned diagnosis. Pt seen in [place] today. Cycle 3 of cisplatin is due today however pt refusing additional chemo. He has 6 days of radiation left and will complete this. He is fatigued. He has some mouth pain and dysphagia and liquid morphine helps and he is requesting refill. Also using magic mouthwash. He is on Xarelto and has no bleeding. He denies nausea/vomiting or diarrhea/constipation. He denies fevers/chills, SOB, CP, cough, or bleeding. Denies numbness/tingling in extremities. He is using PEG tube and about 8 cans/day.      He quit smoking 10 years ago, had smoked 1 ppd for 28 years    Sister had stage IV endometrial cancer in her 46s  Another sister  of ovarian cancer in her 62s     PMH and PSH reviewed above       Reviewed under HPI      Reviewed under HPI    Current Outpatient Medications   Medication Sig    morphine 10 mg/5 mL oral solution Take 2.5 mL by mouth every four (4) hours as needed for Pain for up to 30 days. Max Daily Amount: 30 mg.    nystatin (MYCOSTATIN) 100,000 unit/mL suspension Take 5 mL by mouth four (4) times daily. swish and swallow    pantoprazole (PROTONIX) 40 mg tablet Take 1 Tab by mouth daily.  fluconazole (DIFLUCAN) 200 mg tablet Take 1 Tab by mouth daily for 14 days. FDA advises cautious prescribing of oral fluconazole in pregnancy.  saliva substitution (BIOTENE DRY MOUTH ORAL RINSE) mwsh mouthwash 15 mL by Mucous Membrane route as needed.  cyanocobalamin 1,000 mcg tablet Take 1,000 mcg by mouth daily.  rivaroxaban (XARELTO) 15 mg (42)- 20 mg (9) DsPk Take one 15 mg tablet twice a day with food for the first 21 days. Then, take one 20 mg tablet once a day with food for 9 days.  scopolamine (TRANSDERM-SCOP) 1 mg over 3 days pt3d 1 Patch by TransDERmal route every seventy-two (72) hours.  methylPREDNISolone (MEDROL) 8 mg tablet Take 5 Tabs by mouth See Admin Instructions. Take 5 tabs by mouth (40mg) on days 2&3 of chemotherapy    nut tx, lact-reduced, iron (BOOST VHC) 0.09-2.25 gram-kcal/mL liqd 6 Cans by Per G Tube route daily.  baclofen (LIORESAL) 10 mg tablet Take 1 Tab by mouth three (3) times daily. (Patient taking differently: Take 10 mg by mouth three (3) times daily as needed for Other (Hiccups). )    aluminum-magnesium hydroxide 200-200 mg/5 mL susp 5 mL, diphenhydrAMINE 12.5 mg/5 mL liqd 12.5 mg, lidocaine 2 % soln 5 mL 5 mL by Swish and Spit route two (2) times a day. Maalox  Lidocaine 2% viscous   Diphenhydramine oral solution     Pharmacy to mix equal portions of ingredients to a total volume as indicated in the dispense amount.   Swish&spit 5mL (1 tsp) 4 times daily as needed    ondansetron (ZOFRAN ODT) 8 mg disintegrating tablet Take 1 Tab by mouth every eight (8) hours as needed for Nausea.  hydroCHLOROthiazide (HYDRODIURIL) 12.5 mg tablet Take 12.5 mg by mouth daily.  atorvastatin (LIPITOR) 20 mg tablet Take  by mouth daily.  multivitamin (ONE A DAY) tablet Take 1 Tab by mouth daily. No current facility-administered medications for this encounter. No Known Allergies     Review of Systems: A complete review of systems was obtained, negative except as described above. Physical Exam:     Visit Vitals  /67   Pulse 100   Temp 98 °F (36.7 °C)   Resp 18     ECOG PS: 1  General: No distress  Eyes: PERRLA, anicteric sclerae  HENT: R tonsil enlarged with irregular contour, cant appreciate the posterior pharyngeal wall, OP erythema  Neck: Supple, PAC looks good on R chest wall  Lymphatic: palpable L cervical nodes  Respiratory: CTAB, normal respiratory effort  CV: Normal rate, regular rhythm, no murmurs, no peripheral edema  GI: Soft, nontender, nondistended, no masses, no hepatomegaly, no splenomegaly, PEG tube in place  MS: Normal gait and station. Digits without clubbing or cyanosis. Skin: No rashes, ecchymoses, or petechiae. Normal temperature, turgor, and texture. Psych: Alert, oriented, appropriate affect, normal judgment/insight    Results:     Lab Results   Component Value Date/Time    WBC 3.2 (L) 12/18/2019 10:34 AM    HGB 9.7 (L) 12/18/2019 10:34 AM    HCT 29.9 (L) 12/18/2019 10:34 AM    PLATELET 033 90/28/8352 10:34 AM    MCV 70.7 (L) 12/18/2019 10:34 AM    ABS.  NEUTROPHILS PENDING 12/18/2019 10:34 AM     Lab Results   Component Value Date/Time    Sodium 137 12/18/2019 10:34 AM    Potassium 4.0 12/18/2019 10:34 AM    Chloride 105 12/18/2019 10:34 AM    CO2 26 12/18/2019 10:34 AM    Glucose 150 (H) 12/18/2019 10:34 AM    BUN 18 12/18/2019 10:34 AM    Creatinine 0.88 12/18/2019 10:34 AM    GFR est AA >60 12/18/2019 10:34 AM    GFR est non-AA >60 12/18/2019 10:34 AM    Calcium 8.9 12/18/2019 10:34 AM     Lab Results Component Value Date/Time    Bilirubin, total 0.3 12/18/2019 10:34 AM    ALT (SGPT) 34 12/18/2019 10:34 AM    AST (SGOT) 20 12/18/2019 10:34 AM    Alk. phosphatase 107 12/18/2019 10:34 AM    Protein, total 7.5 12/18/2019 10:34 AM    Albumin 3.2 (L) 12/18/2019 10:34 AM    Globulin 4.3 (H) 12/18/2019 10:34 AM     Records reviewed and summarized above. Pathology report(s) reviewed above. Radiology report(s) reviewed above. CT neck 12/3/19: IMPRESSION:   1. Extensive right cervical lymphadenopathy as previously described, with a  very slight interval decrease in size but continued heterogeneous enhancement  consistent with necrosis. There is no abscess formation or liquefaction,  however. 2. Stranding of surrounding fat which may be related to treatment and or venous  congestion. 3. Intraluminal thrombus in the right internal jugular vein, new since the prior  study    Assessment:   1) R tonsil SCC    CT neck and ENT exam reports reviewed  Per discussion with Dr. Abdias Bond the tonsillar mass may be involving the pharyngeal wall  CT suggests multiple ipsilateral nodes , atleast one ~3  cm and none > 6 cm  PET CT shows no distant mets  HPV positive    Clinically at least T2N1- Though could be T3N1 - HPV postive- stage I-II  He does have a h/o smoking for 35 years    Surgery not recommended by ENT due to disease extent and likelihood of poor functional outcomes    He has an otherwise excellent PS     S/p 2 cycles of Cisplatin, toxicities include grade 1-2 nausea, grade 1 fatigue, and grade 2 mucositis (see below)    CT of neck obtained yesterday in ER 12/3 which showed very slight interval decrease in size of right cervical lymphadenopathy as well as a right internal jugular vein thrombus (see below)     Cycle 3 of cisplatin due today however pt refusing additional chemotherapy. Has 6 days of radiation left.      Will give IVF today and weekly for as long as needed     2) H/O Smoking  Quit    3)  FH of Cancers  Should consider genetic counseling    4) Psychosocial  Coping well  Met with SW  Supportive family    5) Nausea  No symptoms today    6) Dysphagia and mucositis and oral thrush   Grade 2  Instructed him to swish and swallow magic mouthwash  Liquid morphine PRN for pain refilled   Now requiring PEG tube, 8 cans/day   No recurrent thrush     7) Increased secretions  Mucinex + Scopolamine patch     8) Diarrhea  Resolved     9) Right IJ thrombus  On Xarelto and will need this for 3 months     Plan:     · Continue concurrent RT   · No more Cisplatin   · Weekly IVF + Labs + OV   · Zofran 8mg q8 hours on a regular schedule  · Zyprexa QHS   · Ativan 0.5mg q6 hours PRN breakthrough, not past 6pm due to Zyprexa QHS   · Magic mouthwash PRN; swish and swallow   · Liquid morphine PRN for pain refilled   · Mucinex + Scopolamine patch for increased secretions   · RD following, continue feeds via PEG tube, 8 cans/day is goal   · Weekly [facility] apts for IVF & OV   · Continue Xarelto x 3 months     RTC in 1 week for IVF, labs, symptom assessment     I appreciate the opportunity to participate in Mr. Roma clayton.     Signed By: Marleni Hudson NP

## 2019-12-18 NOTE — PROGRESS NOTES
[facility] Short Visit Note:  Medications Administered     sodium chloride 0.9 % bolus infusion 1,000 mL     Admin Date  12/18/2019 Action  New Bag Dose  1000 mL Rate  1,000 mL/hr Route  IntraVENous Administered By  Nico Mcintosh RN              2172:  Pt arrived ambulatory and in no distress for CISPLATIN (HELD PER PATIENT REQUEST)  And HYDRATION that he tolerated well. Port accessed and labs drawn and sent for processing. Port flushed and de accessed per rachel at the end of hydration  D/Cd from [facility] ambulatory and in no distress. Next visit 12/27/19 for hydration , next chemo planned for 1-8-20.     Visit Vitals  /67   Pulse 100   Temp 98 °F (36.7 °C)   Resp 18

## 2019-12-27 ENCOUNTER — HOSPITAL ENCOUNTER (OUTPATIENT)
Dept: INFUSION THERAPY | Age: 64
Discharge: HOME | End: 2019-12-27
Payer: COMMERCIAL

## 2019-12-27 VITALS
SYSTOLIC BLOOD PRESSURE: 134 MMHG | DIASTOLIC BLOOD PRESSURE: 75 MMHG | RESPIRATION RATE: 18 BRPM | HEART RATE: 107 BPM | TEMPERATURE: 98.3 F

## 2019-12-27 DIAGNOSIS — C09.9 TONSIL CANCER (HCC): Primary | ICD-10-CM

## 2019-12-27 PROCEDURE — 77030012965 HC NDL HUBR BBMI -A

## 2019-12-27 PROCEDURE — 96360 HYDRATION IV INFUSION INIT: CPT

## 2019-12-27 PROCEDURE — 74011250636 HC RX REV CODE- 250/636: Performed by: REGISTERED NURSE

## 2019-12-27 RX ORDER — HEPARIN 100 UNIT/ML
500 SYRINGE INTRAVENOUS AS NEEDED
Status: DISCONTINUED | OUTPATIENT
Start: 2019-12-27 | End: 2019-12-28 | Stop reason: HOSPADM

## 2019-12-27 RX ORDER — SODIUM CHLORIDE 0.9 % (FLUSH) 0.9 %
5-10 SYRINGE (ML) INJECTION AS NEEDED
Status: DISCONTINUED | OUTPATIENT
Start: 2019-12-27 | End: 2019-12-28 | Stop reason: HOSPADM

## 2019-12-27 RX ADMIN — Medication 10 ML: at 12:20

## 2019-12-27 RX ADMIN — SODIUM CHLORIDE 1000 ML: 900 INJECTION, SOLUTION INTRAVENOUS at 11:20

## 2019-12-27 NOTE — PROGRESS NOTES
[facility] [place] [facility] [place] [address] [address], [address]  W: [phone]  F: [phone]    Reason for Visit:   Janny Flores is a 60 y.o. 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 Size is measuring up to 2.5 cm in short axis. Right internal jugular vein is nearly fully compressed, no contralateral adenopathy seen. · 2019-ENT exam showed very inflamed tonsil on the right side with right Epley and surface, reportedly invading the pharyngeal wall-biopsy of the right tonsil showed invasive nonkeratinizing squamous cell carcinoma moderately differentiated  · 2019: PET CT Right tonsillar mass with hypermetabolic right cervical lymphadenopathy  · 19: cycle 1 cisplatin + RT  · 19: radiation completed     History of Present Illness:   Patient is a 60 y.o. male seen for SCC of the R tonsil    3 months ago he noted a small R neck swelling. Saw Dr. Dilma Aguilar. Tried antibiotics for 10 days. Returned 19 and had an USG that showed adenopathy and then referred to Dr. Annie Castro. This led to above mentioned diagnosis. Pt seen in [facility] today. He completed radiation today. He feels ok. Has had no nausea/vomiting. Has increased secretions and some mouth pain. Using magic mouthwash and morphine which helps. He is not using mucinex or scopolamine patch. He denies diarrhea/constipation. He denies fevers/chills, SOB, CP, cough, or bleeding. Denies numbness/tingling in extremities. He is using PEG tube and about 8 cans/day. With supportive wife today.      He quit smoking 10 years ago, had smoked 1 ppd for 28 years    Sister had stage IV endometrial cancer in her 46s  Another sister  of ovarian cancer in her 62s     PMH and PSH reviewed above    [facility]   Reviewed under HPI    [place]  Reviewed under HPI    Current Outpatient Medications   Medication Sig    morphine 10 mg/5 mL oral solution Take 2.5 mL by mouth every four (4) hours as needed for Pain for up to 30 days. Max Daily Amount: 30 mg.    nystatin (MYCOSTATIN) 100,000 unit/mL suspension Take 5 mL by mouth four (4) times daily. swish and swallow    pantoprazole (PROTONIX) 40 mg tablet Take 1 Tab by mouth daily.  saliva substitution (BIOTENE DRY MOUTH ORAL RINSE) mwsh mouthwash 15 mL by Mucous Membrane route as needed.  cyanocobalamin 1,000 mcg tablet Take 1,000 mcg by mouth daily.  rivaroxaban (XARELTO) 15 mg (42)- 20 mg (9) DsPk Take one 15 mg tablet twice a day with food for the first 21 days. Then, take one 20 mg tablet once a day with food for 9 days.  scopolamine (TRANSDERM-SCOP) 1 mg over 3 days pt3d 1 Patch by TransDERmal route every seventy-two (72) hours.  methylPREDNISolone (MEDROL) 8 mg tablet Take 5 Tabs by mouth See Admin Instructions. Take 5 tabs by mouth (40mg) on days 2&3 of chemotherapy    nut tx, lact-reduced, iron (BOOST VHC) 0.09-2.25 gram-kcal/mL liqd 6 Cans by Per G Tube route daily.  baclofen (LIORESAL) 10 mg tablet Take 1 Tab by mouth three (3) times daily. (Patient taking differently: Take 10 mg by mouth three (3) times daily as needed for Other (Hiccups). )    aluminum-magnesium hydroxide 200-200 mg/5 mL susp 5 mL, diphenhydrAMINE 12.5 mg/5 mL liqd 12.5 mg, lidocaine 2 % soln 5 mL 5 mL by Swish and Spit route two (2) times a day. Maalox  Lidocaine 2% viscous   Diphenhydramine oral solution     Pharmacy to mix equal portions of ingredients to a total volume as indicated in the dispense amount. Swish&spit 5mL (1 tsp) 4 times daily as needed    ondansetron (ZOFRAN ODT) 8 mg disintegrating tablet Take 1 Tab by mouth every eight (8) hours as needed for Nausea.  hydroCHLOROthiazide (HYDRODIURIL) 12.5 mg tablet Take 12.5 mg by mouth daily.  atorvastatin (LIPITOR) 20 mg tablet Take  by mouth daily.     multivitamin (ONE A DAY) tablet Take 1 Tab by mouth daily. Current Facility-Administered Medications   Medication Dose Route Frequency    sodium chloride 0.9 % bolus infusion 1,000 mL  1,000 mL IntraVENous ONCE    heparin (porcine) pf 500 Units  500 Units IntraVENous PRN    sodium chloride (NS) flush 5-10 mL  5-10 mL IntraVENous PRN      No Known Allergies     Review of Systems: A complete review of systems was obtained, negative except as described above. Physical Exam:     Visit Vitals  /63 (BP 1 Location: Right arm)   Pulse (!) 113   Temp 98.3 °F (36.8 °C)   Resp 18     ECOG PS: 1  General: No distress  Eyes: PERRLA, anicteric sclerae  HENT: R tonsil enlarged with irregular contour, cant appreciate the posterior pharyngeal wall, OP erythema  Neck: Supple, PAC looks good on R chest wall  Lymphatic: palpable L cervical nodes  Respiratory: CTAB, normal respiratory effort  CV: Normal rate, regular rhythm, no murmurs, no peripheral edema  GI: Soft, nontender, nondistended, no masses, no hepatomegaly, no splenomegaly, PEG tube in place  MS: Normal gait and station. Digits without clubbing or cyanosis. Skin: No rashes, ecchymoses, or petechiae. Normal temperature, turgor, and texture. Psych: Alert, oriented, appropriate affect, normal judgment/insight    Results:     Lab Results   Component Value Date/Time    WBC 3.2 (L) 12/18/2019 10:34 AM    HGB 9.7 (L) 12/18/2019 10:34 AM    HCT 29.9 (L) 12/18/2019 10:34 AM    PLATELET 979 89/11/2609 10:34 AM    MCV 70.7 (L) 12/18/2019 10:34 AM    ABS.  NEUTROPHILS 2.6 12/18/2019 10:34 AM     Lab Results   Component Value Date/Time    Sodium 137 12/18/2019 10:34 AM    Potassium 4.0 12/18/2019 10:34 AM    Chloride 105 12/18/2019 10:34 AM    CO2 26 12/18/2019 10:34 AM    Glucose 150 (H) 12/18/2019 10:34 AM    BUN 18 12/18/2019 10:34 AM    Creatinine 0.88 12/18/2019 10:34 AM    GFR est AA >60 12/18/2019 10:34 AM    GFR est non-AA >60 12/18/2019 10:34 AM    Calcium 8.9 12/18/2019 10:34 AM     Lab Results   Component Value Date/Time    Bilirubin, total 0.3 12/18/2019 10:34 AM    ALT (SGPT) 34 12/18/2019 10:34 AM    AST (SGOT) 20 12/18/2019 10:34 AM    Alk. phosphatase 107 12/18/2019 10:34 AM    Protein, total 7.5 12/18/2019 10:34 AM    Albumin 3.2 (L) 12/18/2019 10:34 AM    Globulin 4.3 (H) 12/18/2019 10:34 AM     Records reviewed and summarized above. Pathology report(s) reviewed above. Radiology report(s) reviewed above. CT neck 12/3/19: IMPRESSION:   1. Extensive right cervical lymphadenopathy as previously described, with a  very slight interval decrease in size but continued heterogeneous enhancement  consistent with necrosis. There is no abscess formation or liquefaction,  however. 2. Stranding of surrounding fat which may be related to treatment and or venous  congestion. 3. Intraluminal thrombus in the right internal jugular vein, new since the prior  study    Assessment:   1) R tonsil SCC    CT neck and ENT exam reports reviewed  Per discussion with Dr. Ronaldo Martinez the tonsillar mass may be involving the pharyngeal wall  CT suggests multiple ipsilateral nodes , atleast one ~3  cm and none > 6 cm  PET CT shows no distant mets  HPV positive    Clinically at least T2N1- Though could be T3N1 - HPV postive- stage I-II  He does have a h/o smoking for 35 years    Surgery not recommended by ENT due to disease extent and likelihood of poor functional outcomes    He has an otherwise excellent PS     S/p 2 cycles of Cisplatin, toxicities include grade 1-2 nausea, grade 1 fatigue, and grade 2 mucositis (see below)    CT of neck obtained yesterday in ER 12/3 which showed very slight interval decrease in size of right cervical lymphadenopathy as well as a right internal jugular vein thrombus (see below)     He refused cycle 3 of Cisplatin. Completed RT today.      Will give IVF today and weekly for as long as needed, next OV will be 1/9/20.     2) H/O Smoking  Quit    3)  FH of Cancers  Should consider genetic counseling    4) Psychosocial  Coping well  Met with SW  Supportive family    5) Nausea  No symptoms today    6) Dysphagia and mucositis and oral thrush   Grade 2  Instructed him to swish and swallow magic mouthwash  Liquid morphine PRN for pain  Now requiring PEG tube, 8 cans/day   No recurrent thrush     7) Increased secretions  Mucinex + Scopolamine patch --encouraged compliance which he agrees     8) Diarrhea  Resolved     9) Right IJ thrombus  On Xarelto and will need this for 3 months     Plan:     · Continue concurrent RT, completed today  · No more Cisplatin   · Weekly IVF + Labs + OV   · Zofran 8mg q8 hours on a regular schedule  · Zyprexa QHS   · Ativan 0.5mg q6 hours PRN breakthrough, not past 6pm due to Zyprexa QHS   · Magic mouthwash PRN; swish and swallow   · Liquid morphine PRN for pain refilled   · Mucinex + Scopolamine patch for increased secretions   · RD following, continue feeds via PEG tube, 8 cans/day is goal   · Weekly [facility] apts for IVF & OV   · Continue Xarelto x 3 months     [facility] 1/1 for hydration and 1/8, OV 1/8    I appreciate the opportunity to participate in Mr. Trista clayton.     Signed By: Angela Villanueva NP

## 2019-12-27 NOTE — PROGRESS NOTES
SHORT VISIT PROGRESS NOTE:     1110 Pt admit to [facility] ambulatory in stable condition. Assessment completed, no new concerns voiced. HR slightly elevated. Port accessed without difficulty; (0.75 inch castle), positive blood return, pt connected to NS. Patient Vitals for the past 12 hrs:   Temp Pulse Resp BP   12/27/19 1225 -- (!) 107 -- 134/75   12/27/19 1112 98.3 °F (36.8 °C) (!) 113 18 100/63     Medications Administered     sodium chloride (NS) flush 5-10 mL     Admin Date  12/27/2019 Action  Given Dose  10 mL Route  IntraVENous Administered By  Gatito Perez, RN          sodium chloride 0.9 % bolus infusion 1,000 mL     Admin Date  12/27/2019 Action  New Bag Dose  1000 mL Rate  1,000 mL/hr Route  IntraVENous Administered By  Gatito [place], TONIA              3160 Pt tolerated treatment well. Port flushed, heparinized and de-accessed per protocol. D/C ambulatory home in no distress. Pt aware of next apt on 1/1/2020.      Future Appointments   Date Time Provider Mayda Miguel   1/1/2020  8:30 AM [facility] NURSE [address] [facility]    1/8/2020  9:00 AM [facility]FUSION NURSE [address] [facility]

## 2020-01-01 ENCOUNTER — HOSPITAL ENCOUNTER (OUTPATIENT)
Dept: INFUSION THERAPY | Age: 65
Discharge: HOME | End: 2020-01-01
Payer: COMMERCIAL

## 2020-01-01 VITALS
DIASTOLIC BLOOD PRESSURE: 61 MMHG | SYSTOLIC BLOOD PRESSURE: 112 MMHG | HEART RATE: 112 BPM | TEMPERATURE: 98.1 F | RESPIRATION RATE: 18 BRPM

## 2020-01-01 DIAGNOSIS — C09.9 TONSIL CANCER (HCC): Primary | ICD-10-CM

## 2020-01-01 PROCEDURE — 77030012965 HC NDL HUBR BBMI -A

## 2020-01-01 PROCEDURE — 74011250636 HC RX REV CODE- 250/636: Performed by: INTERNAL MEDICINE

## 2020-01-01 PROCEDURE — 96360 HYDRATION IV INFUSION INIT: CPT

## 2020-01-01 PROCEDURE — 74011250636 HC RX REV CODE- 250/636: Performed by: REGISTERED NURSE

## 2020-01-01 RX ORDER — SODIUM CHLORIDE 9 MG/ML
10 INJECTION INTRAMUSCULAR; INTRAVENOUS; SUBCUTANEOUS AS NEEDED
Status: DISCONTINUED | OUTPATIENT
Start: 2020-01-01 | End: 2020-01-02 | Stop reason: HOSPADM

## 2020-01-01 RX ORDER — HEPARIN 100 UNIT/ML
500 SYRINGE INTRAVENOUS AS NEEDED
Status: DISCONTINUED | OUTPATIENT
Start: 2020-01-01 | End: 2020-01-02 | Stop reason: HOSPADM

## 2020-01-01 RX ORDER — SODIUM CHLORIDE 0.9 % (FLUSH) 0.9 %
5-10 SYRINGE (ML) INJECTION AS NEEDED
Status: DISCONTINUED | OUTPATIENT
Start: 2020-01-01 | End: 2020-01-02 | Stop reason: HOSPADM

## 2020-01-01 RX ADMIN — SODIUM CHLORIDE 10 ML: 9 INJECTION INTRAMUSCULAR; INTRAVENOUS; SUBCUTANEOUS at 10:02

## 2020-01-01 RX ADMIN — SODIUM CHLORIDE 10 ML: 9 INJECTION INTRAMUSCULAR; INTRAVENOUS; SUBCUTANEOUS at 09:00

## 2020-01-01 RX ADMIN — Medication 10 ML: at 09:00

## 2020-01-01 RX ADMIN — HEPARIN 500 UNITS: 100 SYRINGE at 10:02

## 2020-01-01 RX ADMIN — SODIUM CHLORIDE 1000 ML: 900 INJECTION, SOLUTION INTRAVENOUS at 09:00

## 2020-01-01 NOTE — PROGRESS NOTES
Outpatient Infusion Center   Visit Progress Note    3932 Patient admitted to [facility] for hydration ambulatory in stable condition. Assessment completed. No new concerns voiced. Visit Vitals  /61   Pulse (!) 112   Temp 98.1 °F (36.7 °C)   Resp 18       PAC with positive blood return. Medications:  1L NS over 60 min    1005 Patient tolerated treatment well. Patient discharged from [facility] ambulatory in no distress. Patient aware of next appointment.

## 2020-01-08 ENCOUNTER — TELEPHONE (OUTPATIENT)
Dept: ONCOLOGY | Age: 65
End: 2020-01-08

## 2020-01-08 ENCOUNTER — OFFICE VISIT (OUTPATIENT)
Dept: ONCOLOGY | Age: 65
End: 2020-01-08

## 2020-01-08 ENCOUNTER — HOSPITAL ENCOUNTER (OUTPATIENT)
Dept: INTERVENTIONAL RADIOLOGY/VASCULAR | Age: 65
Discharge: HOME | End: 2020-01-08
Attending: REGISTERED NURSE
Payer: COMMERCIAL

## 2020-01-08 ENCOUNTER — HOSPITAL ENCOUNTER (OUTPATIENT)
Dept: INFUSION THERAPY | Age: 65
Discharge: HOME | End: 2020-01-08
Payer: COMMERCIAL

## 2020-01-08 VITALS
TEMPERATURE: 98.2 F | HEART RATE: 101 BPM | RESPIRATION RATE: 18 BRPM | DIASTOLIC BLOOD PRESSURE: 71 MMHG | SYSTOLIC BLOOD PRESSURE: 97 MMHG

## 2020-01-08 VITALS
HEART RATE: 104 BPM | SYSTOLIC BLOOD PRESSURE: 121 MMHG | RESPIRATION RATE: 17 BRPM | TEMPERATURE: 98 F | BODY MASS INDEX: 21.48 KG/M2 | DIASTOLIC BLOOD PRESSURE: 74 MMHG | OXYGEN SATURATION: 98 % | HEIGHT: 69 IN | WEIGHT: 145 LBS

## 2020-01-08 VITALS
BODY MASS INDEX: 21.48 KG/M2 | SYSTOLIC BLOOD PRESSURE: 141 MMHG | DIASTOLIC BLOOD PRESSURE: 67 MMHG | HEART RATE: 96 BPM | HEIGHT: 69 IN | OXYGEN SATURATION: 100 % | TEMPERATURE: 98.4 F | WEIGHT: 145 LBS

## 2020-01-08 DIAGNOSIS — I82.90 ACUTE DEEP VEIN THROMBOSIS (DVT) OF NON-EXTREMITY VEIN: ICD-10-CM

## 2020-01-08 DIAGNOSIS — C09.9 TONSIL CANCER (HCC): Primary | ICD-10-CM

## 2020-01-08 DIAGNOSIS — C09.9 TONSIL CANCER (HCC): ICD-10-CM

## 2020-01-08 DIAGNOSIS — C76.0 HEAD AND NECK CANCER (HCC): ICD-10-CM

## 2020-01-08 LAB
ALBUMIN SERPL-MCNC: 2.8 G/DL (ref 3.5–5)
ALBUMIN/GLOB SERPL: 1 {RATIO} (ref 1.1–2.2)
ALP SERPL-CCNC: 81 U/L (ref 45–117)
ALT SERPL-CCNC: 18 U/L (ref 12–78)
ANION GAP SERPL CALC-SCNC: 4 MMOL/L (ref 5–15)
AST SERPL-CCNC: 13 U/L (ref 15–37)
BASOPHILS # BLD: 0 K/UL (ref 0–0.1)
BASOPHILS NFR BLD: 0 % (ref 0–1)
BILIRUB SERPL-MCNC: 0.2 MG/DL (ref 0.2–1)
BUN SERPL-MCNC: 17 MG/DL (ref 6–20)
BUN/CREAT SERPL: 27 (ref 12–20)
CALCIUM SERPL-MCNC: 8.3 MG/DL (ref 8.5–10.1)
CHLORIDE SERPL-SCNC: 111 MMOL/L (ref 97–108)
CO2 SERPL-SCNC: 28 MMOL/L (ref 21–32)
CREAT SERPL-MCNC: 0.64 MG/DL (ref 0.7–1.3)
DIFFERENTIAL METHOD BLD: ABNORMAL
EOSINOPHIL # BLD: 0.1 K/UL (ref 0–0.4)
EOSINOPHIL NFR BLD: 2 % (ref 0–7)
ERYTHROCYTE [DISTWIDTH] IN BLOOD BY AUTOMATED COUNT: 17.8 % (ref 11.5–14.5)
GLOBULIN SER CALC-MCNC: 2.9 G/DL (ref 2–4)
GLUCOSE SERPL-MCNC: 97 MG/DL (ref 65–100)
HCT VFR BLD AUTO: 22.9 % (ref 36.6–50.3)
HGB BLD-MCNC: 7.3 G/DL (ref 12.1–17)
IMM GRANULOCYTES # BLD AUTO: 0 K/UL (ref 0–0.04)
IMM GRANULOCYTES NFR BLD AUTO: 1 % (ref 0–0.5)
LYMPHOCYTES # BLD: 0.7 K/UL (ref 0.8–3.5)
LYMPHOCYTES NFR BLD: 15 % (ref 12–49)
MCH RBC QN AUTO: 23.3 PG (ref 26–34)
MCHC RBC AUTO-ENTMCNC: 31.9 G/DL (ref 30–36.5)
MCV RBC AUTO: 73.2 FL (ref 80–99)
MONOCYTES # BLD: 0.7 K/UL (ref 0–1)
MONOCYTES NFR BLD: 17 % (ref 5–13)
NEUTS SEG # BLD: 2.9 K/UL (ref 1.8–8)
NEUTS SEG NFR BLD: 65 % (ref 32–75)
NRBC # BLD: 0 K/UL (ref 0–0.01)
NRBC BLD-RTO: 0 PER 100 WBC
PLATELET # BLD AUTO: 223 K/UL (ref 150–400)
PMV BLD AUTO: 10.3 FL (ref 8.9–12.9)
POTASSIUM SERPL-SCNC: 4.2 MMOL/L (ref 3.5–5.1)
PROT SERPL-MCNC: 5.7 G/DL (ref 6.4–8.2)
RBC # BLD AUTO: 3.13 M/UL (ref 4.1–5.7)
RBC MORPH BLD: ABNORMAL
SODIUM SERPL-SCNC: 143 MMOL/L (ref 136–145)
WBC # BLD AUTO: 4.4 K/UL (ref 4.1–11.1)

## 2020-01-08 PROCEDURE — 77030018617 HC TU FEED GASTMY1 COOK -B

## 2020-01-08 PROCEDURE — 74011250636 HC RX REV CODE- 250/636: Performed by: INTERNAL MEDICINE

## 2020-01-08 PROCEDURE — 49450 REPLACE G/C TUBE PERC: CPT

## 2020-01-08 PROCEDURE — 80053 COMPREHEN METABOLIC PANEL: CPT

## 2020-01-08 PROCEDURE — 85025 COMPLETE CBC W/AUTO DIFF WBC: CPT

## 2020-01-08 PROCEDURE — 36415 COLL VENOUS BLD VENIPUNCTURE: CPT

## 2020-01-08 PROCEDURE — 77030012965 HC NDL HUBR BBMI -A

## 2020-01-08 PROCEDURE — 74011250636 HC RX REV CODE- 250/636: Performed by: REGISTERED NURSE

## 2020-01-08 PROCEDURE — 96360 HYDRATION IV INFUSION INIT: CPT

## 2020-01-08 RX ORDER — LIDOCAINE HYDROCHLORIDE 20 MG/ML
JELLY TOPICAL AS NEEDED
Status: DISCONTINUED | OUTPATIENT
Start: 2020-01-08 | End: 2020-01-12 | Stop reason: HOSPADM

## 2020-01-08 RX ORDER — SODIUM CHLORIDE 0.9 % (FLUSH) 0.9 %
5-10 SYRINGE (ML) INJECTION AS NEEDED
Status: DISCONTINUED | OUTPATIENT
Start: 2020-01-08 | End: 2020-01-09 | Stop reason: HOSPADM

## 2020-01-08 RX ORDER — HEPARIN 100 UNIT/ML
500 SYRINGE INTRAVENOUS AS NEEDED
Status: DISCONTINUED | OUTPATIENT
Start: 2020-01-08 | End: 2020-01-09 | Stop reason: HOSPADM

## 2020-01-08 RX ORDER — SODIUM CHLORIDE 9 MG/ML
10 INJECTION INTRAMUSCULAR; INTRAVENOUS; SUBCUTANEOUS AS NEEDED
Status: DISCONTINUED | OUTPATIENT
Start: 2020-01-08 | End: 2020-01-09 | Stop reason: HOSPADM

## 2020-01-08 RX ADMIN — Medication 10 ML: at 10:32

## 2020-01-08 RX ADMIN — SODIUM CHLORIDE 10 ML: 9 INJECTION INTRAMUSCULAR; INTRAVENOUS; SUBCUTANEOUS at 09:30

## 2020-01-08 RX ADMIN — SODIUM CHLORIDE 1000 ML: 900 INJECTION, SOLUTION INTRAVENOUS at 09:30

## 2020-01-08 RX ADMIN — Medication 500 UNITS: at 10:32

## 2020-01-08 NOTE — TELEPHONE ENCOUNTER
Patient called and stated that he would like his medication sent to [facility].

## 2020-01-08 NOTE — PROGRESS NOTES
Jj Rahman is a 60 y.o. male  Chief Complaint   Patient presents with    Follow-up     Tonsil cancer     1. Have you been to the ER, urgent care clinic since your last visit? Hospitalized since your last visit? No    2. Have you seen or consulted any other health care providers outside of the [address] [place] [address] since your last visit? Include any pap smears or colon screening.  No

## 2020-01-08 NOTE — PROGRESS NOTES
Pt. Received ambulatory into angio holding after seeing Dr. Devora Yanes for eval of PEG tube. 18 Somali tube \"sliding in and out\" of ostomy site  and dressing noted to be holding it intact. Dressing removed and tube fell out. Tube to be replaced.

## 2020-01-08 NOTE — PROGRESS NOTES
[facility] [place] [facility] [place] [address] [address], [address]  W: [phone]  F: [phone]    Reason for Visit:   Jose Boogie is a 60 y.o. 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 Size is measuring up to 2.5 cm in short axis. Right internal jugular vein is nearly fully compressed, no contralateral adenopathy seen. · 9/5/2019-ENT exam showed very inflamed tonsil on the right side with right Epley and surface, reportedly invading the pharyngeal wall-biopsy of the right tonsil showed invasive nonkeratinizing squamous cell carcinoma moderately differentiated  · 9/2019: PET CT Right tonsillar mass with hypermetabolic right cervical lymphadenopathy  · 11/6/19: cycle 1 cisplatin + RT    History of Present Illness:   Patient is a 60 y.o. male seen for SCC of the R tonsil    3 months ago he noted a small R neck swelling. Saw Dr. Veronica Moore. Tried antibiotics for 10 days. Returned 8/9/19 and had an USG that showed adenopathy and then referred to Dr. Mohsen Jauregui. This led to above mentioned diagnosis. Comes in today for follow-up. He was in the ER yesterday with right neck swelling and found to have a thrombus in his right jugular vein. He was started on Xarelto and took first dose this morning. He already believes swelling has decreased. His nausea is better controlled now with current regimen. He has increased secretions. His pain and dysphagia is managed with magic mouthwash and liquid morphine however he prefers not to use this frequently. He denies fevers/chills, SOB, CP, cough, or bleeding. Denies numbness/tingling in extremities. Has had intermittent diarrhea but this has improved since starting Imodium. Had no episodes yesterday and 1 episode today.  He is using PEG tube and about 8 cans/day. Secretions have improved greatly. Still has some mild neck pain. Dnies nausea vomiting or diarrhea/constipation. Uses magic mouthwash for pain which is helping. No longer requiring liquid morphine. He quit smoking 10 years ago, had smoked 1 ppd for 28 years    Sister had stage IV endometrial cancer in her 46s  Another sister  of ovarian cancer in her 62s     [address] reviewed above    [address]   Reviewed under HPI      Reviewed under HPI    Current Outpatient Medications   Medication Sig    morphine 10 mg/5 mL oral solution Take 2.5 mL by mouth every four (4) hours as needed for Pain for up to 30 days. Max Daily Amount: 30 mg.    nystatin (MYCOSTATIN) 100,000 unit/mL suspension Take 5 mL by mouth four (4) times daily. swish and swallow    pantoprazole (PROTONIX) 40 mg tablet Take 1 Tab by mouth daily.  saliva substitution (BIOTENE DRY MOUTH ORAL RINSE) mwsh mouthwash 15 mL by Mucous Membrane route as needed.  cyanocobalamin 1,000 mcg tablet Take 1,000 mcg by mouth daily.  rivaroxaban (XARELTO) 15 mg (42)- 20 mg (9) DsPk Take one 15 mg tablet twice a day with food for the first 21 days. Then, take one 20 mg tablet once a day with food for 9 days.  scopolamine (TRANSDERM-SCOP) 1 mg over 3 days pt3d 1 Patch by TransDERmal route every seventy-two (72) hours.  methylPREDNISolone (MEDROL) 8 mg tablet Take 5 Tabs by mouth See Admin Instructions. Take 5 tabs by mouth (40mg) on days 2&3 of chemotherapy    nut tx, lact-reduced, iron (BOOST VHC) 0.09-2.25 gram-kcal/mL liqd 6 Cans by Per G Tube route daily.  baclofen (LIORESAL) 10 mg tablet Take 1 Tab by mouth three (3) times daily. (Patient taking differently: Take 10 mg by mouth three (3) times daily as needed for Other (Hiccups). )    aluminum-magnesium hydroxide 200-200 mg/5 mL susp 5 mL, diphenhydrAMINE 12.5 mg/5 mL liqd 12.5 mg, lidocaine 2 % soln 5 mL 5 mL by Swish and Spit route two (2) times a day.  Maalox  Lidocaine 2% viscous Diphenhydramine oral solution     Pharmacy to mix equal portions of ingredients to a total volume as indicated in the dispense amount. Swish&spit 5mL (1 tsp) 4 times daily as needed    ondansetron (ZOFRAN ODT) 8 mg disintegrating tablet Take 1 Tab by mouth every eight (8) hours as needed for Nausea.  hydroCHLOROthiazide (HYDRODIURIL) 12.5 mg tablet Take 12.5 mg by mouth daily.  atorvastatin (LIPITOR) 20 mg tablet Take  by mouth daily.  multivitamin (ONE A DAY) tablet Take 1 Tab by mouth daily. No current facility-administered medications for this visit. Facility-Administered Medications Ordered in Other Visits   Medication Dose Route Frequency    heparin (porcine) pf 500 Units  500 Units IntraVENous PRN    sodium chloride (NS) flush 5-10 mL  5-10 mL IntraVENous PRN    0.9% sodium chloride injection 10 mL  10 mL IntraVENous PRN      No Known Allergies     Review of Systems: A complete review of systems was obtained, negative except as described above. Physical Exam:     Visit Vitals  /74   Pulse (!) 104   Temp 98 °F (36.7 °C) (Oral)   Resp 17   Ht 5' 9\" (1.753 m)   Wt 145 lb (65.8 kg)   SpO2 98%   BMI 21.41 kg/m²     ECOG PS: 1  General: No distress  Eyes: PERRLA, anicteric sclerae  HENT: R tonsil enlarged with irregular contour, with erythema, exudates, cant appreciate the posterior pharyngeal wall, palpable left cervical nodes unchanged, OP erythema and oral thrush noted on back of throat, large firm area of swelling below jaw line on right side d/t thrombus   Neck: Supple, PAC looks good on R chest wall, not accessed   Lymphatic: palpable L cervical nodes  Respiratory: CTAB, normal respiratory effort  CV: Normal rate, regular rhythm, no murmurs, no peripheral edema  GI: Soft, nontender, nondistended, no masses, no hepatomegaly, no splenomegaly, PEG tube in place  MS: Normal gait and station. Digits without clubbing or cyanosis. Skin: No rashes, ecchymoses, or petechiae.  Normal temperature, turgor, and texture. Psych: Alert, oriented, appropriate affect, normal judgment/insight    Results:     Lab Results   Component Value Date/Time    WBC 3.2 (L) 12/18/2019 10:34 AM    HGB 9.7 (L) 12/18/2019 10:34 AM    HCT 29.9 (L) 12/18/2019 10:34 AM    PLATELET 151 10/88/9619 10:34 AM    MCV 70.7 (L) 12/18/2019 10:34 AM    ABS. NEUTROPHILS 2.6 12/18/2019 10:34 AM     Lab Results   Component Value Date/Time    Sodium 137 12/18/2019 10:34 AM    Potassium 4.0 12/18/2019 10:34 AM    Chloride 105 12/18/2019 10:34 AM    CO2 26 12/18/2019 10:34 AM    Glucose 150 (H) 12/18/2019 10:34 AM    BUN 18 12/18/2019 10:34 AM    Creatinine 0.88 12/18/2019 10:34 AM    GFR est AA >60 12/18/2019 10:34 AM    GFR est non-AA >60 12/18/2019 10:34 AM    Calcium 8.9 12/18/2019 10:34 AM     Lab Results   Component Value Date/Time    Bilirubin, total 0.3 12/18/2019 10:34 AM    ALT (SGPT) 34 12/18/2019 10:34 AM    AST (SGOT) 20 12/18/2019 10:34 AM    Alk. phosphatase 107 12/18/2019 10:34 AM    Protein, total 7.5 12/18/2019 10:34 AM    Albumin 3.2 (L) 12/18/2019 10:34 AM    Globulin 4.3 (H) 12/18/2019 10:34 AM     Records reviewed and summarized above. Pathology report(s) reviewed above. Radiology report(s) reviewed above. CT neck 12/3/19: IMPRESSION:   1. Extensive right cervical lymphadenopathy as previously described, with a  very slight interval decrease in size but continued heterogeneous enhancement  consistent with necrosis. There is no abscess formation or liquefaction,  however. 2. Stranding of surrounding fat which may be related to treatment and or venous  congestion.   3. Intraluminal thrombus in the right internal jugular vein, new since the prior  study    Assessment:   1) R tonsil SCC    CT neck and ENT exam reports reviewed  Per discussion with Dr. Cuca Keene the tonsillar mass may be involving the pharyngeal wall  CT suggests multiple ipsilateral nodes , atleast one ~3  cm and none > 6 cm  PET CT shows no distant mets  HPV positive    Clinically at least T2N1- Though could be T3N1 - HPV postive- stage I-II  He does have a h/o smoking for 35 years    Surgery not recommended by ENT due to disease extent and likelihood of poor functional outcomes    He has an otherwise excellent PS     S/p 2 cycles of Cisplatin, toxicities include grade 1-2 nausea, grade 1 fatigue, and grade 2 mucositis (see below)    CT of neck obtained yesterday in ER 12/3 which showed very slight interval decrease in size of right cervical lymphadenopathy as well as a right internal jugular vein thrombus (see below)     Pt unsure if he is willing to receive cycle 3 of Cisplatin however will continue to evaluate with weekly hydration     IVF hydration given today, labs stable     2) H/O Smoking  Quit    3)  FH of Cancers  Should consider genetic counseling    4) Psychosocial  Coping well  Met with SW  Supportive family    5) Nausea  Grade 1 today  Zofran every 8 hours on a regular schedule  Zyprexa QHS  Ativan PRN for breakthrough     6) Dysphagia and mucositis and oral thrush   Grade 2  Instructed him to swish and swallow magic mouthwash  Liquid morphine PRN for pain   Now requiring PEG tube, RD following; 8 cans/day   Fluconazole sent to pharmacy on file for oral thrush (no interactions with Xarelto)     7) Increased secretions  Mucinex + Scopolamine patch     8) Diarrhea  Grade 1 but improving  Imodium PRN    9) Right IJ thrombus  On Xarelto and will need this for 3 months     Plan:     · Continue concurrent chemoRT   · Cycle 3 Cisplatin 12/18 however pt unsure if he will proceed   · Weekly IVF + Labs + OV   · Zofran 8mg q8 hours on a regular schedule  · Zyprexa QHS   · Ativan 0.5mg q6 hours PRN breakthrough, not past 6pm due to Zyprexa QHS   · Magic mouthwash PRN; swish and swallow   · Liquid morphine PRN for pain  · Mucinex + Scopolamine patch for increased secretions   · RD following, continue feeds via PEG tube, 8 cans/day is goal   · Weekly [facility] apts for IVF & OV   · Fluconazole sent to pharmacy on file for oral thrush   · Continue Xarelto x 3 months     RTC in 1 week for IVF & symptom assessment   Seen in conjunction with Beverly Crouch NP    I appreciate the opportunity to participate in Mr. [facility].     Signed By: Beverly Crouch, NP

## 2020-01-08 NOTE — PROGRESS NOTES
[facility] at [address] [place] [address]  [address], [address]  W: [phone]  F: [phone]    Reason for Visit:   Nicole Win is a 60 y.o. 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 Size is measuring up to 2.5 cm in short axis. Right internal jugular vein is nearly fully compressed, no contralateral adenopathy seen. · 2019-ENT exam showed very inflamed tonsil on the right side with right Epley and surface, reportedly invading the pharyngeal wall-biopsy of the right tonsil showed invasive nonkeratinizing squamous cell carcinoma moderately differentiated  · 2019: PET CT Right tonsillar mass with hypermetabolic right cervical lymphadenopathy  · 19: cycle 1 cisplatin + RT  · 19: radiation completed     History of Present Illness:   Patient is a 60 y.o. male seen for SCC of the R tonsil    3 months ago he noted a small R neck swelling. Saw Dr. Jackson [facility]. Tried antibiotics for 10 days. Returned 19 and had an USG that showed adenopathy and then referred to Dr. Bard Chavira. This led to above mentioned diagnosis. Pt comes today for follow-up and IV hydration. He completed radiation on 19. He has been feeling much better. Secretions have decreased and mouth pain has decreased. Only requiring magic mouthwash intermittently. No neck swelling and he feels LN has decreased in size. He denies diarrhea/constipation. He denies fevers/chills, SOB, CP, cough, or bleeding. Denies numbness/tingling in extremities. He is using PEG tube and about 6 cans/day. Weight is stable. On Xarelto.      He quit smoking 10 years ago, had smoked 1 ppd for 28 years    Sister had stage IV endometrial cancer in her 46s  Another sister  of ovarian cancer in her 62s     PMH and PSH reviewed above       Reviewed under HPI      Reviewed under HPI    Current Outpatient Medications   Medication Sig    morphine 10 mg/5 mL oral solution Take 2.5 mL by mouth every four (4) hours as needed for Pain for up to 30 days. Max Daily Amount: 30 mg.    nystatin (MYCOSTATIN) 100,000 unit/mL suspension Take 5 mL by mouth four (4) times daily. swish and swallow    pantoprazole (PROTONIX) 40 mg tablet Take 1 Tab by mouth daily.  saliva substitution (BIOTENE DRY MOUTH ORAL RINSE) mwsh mouthwash 15 mL by Mucous Membrane route as needed.  cyanocobalamin 1,000 mcg tablet Take 1,000 mcg by mouth daily.  rivaroxaban (XARELTO) 15 mg (42)- 20 mg (9) DsPk Take one 15 mg tablet twice a day with food for the first 21 days. Then, take one 20 mg tablet once a day with food for 9 days.  scopolamine (TRANSDERM-SCOP) 1 mg over 3 days pt3d 1 Patch by TransDERmal route every seventy-two (72) hours.  methylPREDNISolone (MEDROL) 8 mg tablet Take 5 Tabs by mouth See Admin Instructions. Take 5 tabs by mouth (40mg) on days 2&3 of chemotherapy    nut tx, lact-reduced, iron (BOOST VHC) 0.09-2.25 gram-kcal/mL liqd 6 Cans by Per G Tube route daily.  baclofen (LIORESAL) 10 mg tablet Take 1 Tab by mouth three (3) times daily. (Patient taking differently: Take 10 mg by mouth three (3) times daily as needed for Other (Hiccups). )    aluminum-magnesium hydroxide 200-200 mg/5 mL susp 5 mL, diphenhydrAMINE 12.5 mg/5 mL liqd 12.5 mg, lidocaine 2 % soln 5 mL 5 mL by Swish and Spit route two (2) times a day. Maalox  Lidocaine 2% viscous   Diphenhydramine oral solution     Pharmacy to mix equal portions of ingredients to a total volume as indicated in the dispense amount. Swish&spit 5mL (1 tsp) 4 times daily as needed    ondansetron (ZOFRAN ODT) 8 mg disintegrating tablet Take 1 Tab by mouth every eight (8) hours as needed for Nausea.  hydroCHLOROthiazide (HYDRODIURIL) 12.5 mg tablet Take 12.5 mg by mouth daily.     atorvastatin (LIPITOR) 20 mg tablet Take  by mouth daily.  multivitamin (ONE A DAY) tablet Take 1 Tab by mouth daily. No current facility-administered medications for this visit. Facility-Administered Medications Ordered in Other Visits   Medication Dose Route Frequency    heparin (porcine) pf 500 Units  500 Units IntraVENous PRN    sodium chloride (NS) flush 5-10 mL  5-10 mL IntraVENous PRN    0.9% sodium chloride injection 10 mL  10 mL IntraVENous PRN      No Known Allergies     Review of Systems: A complete review of systems was obtained, negative except as described above. Physical Exam:     Visit Vitals  /74   Pulse (!) 104   Temp 98 °F (36.7 °C) (Oral)   Resp 17   Ht 5' 9\" (1.753 m)   Wt 145 lb (65.8 kg)   SpO2 98%   BMI 21.41 kg/m²     ECOG PS: 1  General: No distress  Eyes: PERRLA, anicteric sclerae  Neck: Supple, PAC looks good on R chest wall  Lymphatic: L cervical node barely palpable  Respiratory: CTAB, normal respiratory effort  CV: Normal rate, regular rhythm, no murmurs, no peripheral edema  GI: Soft, nontender, nondistended, no masses, no hepatomegaly, no splenomegaly, PEG tube in place  MS: Normal gait and station. Digits without clubbing or cyanosis. Skin: No rashes, ecchymoses, or petechiae. Normal temperature, turgor, and texture. Psych: Alert, oriented, appropriate affect, normal judgment/insight    Results:     Lab Results   Component Value Date/Time    WBC 3.2 (L) 12/18/2019 10:34 AM    HGB 9.7 (L) 12/18/2019 10:34 AM    HCT 29.9 (L) 12/18/2019 10:34 AM    PLATELET 537 79/91/6388 10:34 AM    MCV 70.7 (L) 12/18/2019 10:34 AM    ABS.  NEUTROPHILS 2.6 12/18/2019 10:34 AM     Lab Results   Component Value Date/Time    Sodium 137 12/18/2019 10:34 AM    Potassium 4.0 12/18/2019 10:34 AM    Chloride 105 12/18/2019 10:34 AM    CO2 26 12/18/2019 10:34 AM    Glucose 150 (H) 12/18/2019 10:34 AM    BUN 18 12/18/2019 10:34 AM    Creatinine 0.88 12/18/2019 10:34 AM    GFR est AA >60 12/18/2019 10:34 AM    GFR est non-AA >60 12/18/2019 10:34 AM    Calcium 8.9 12/18/2019 10:34 AM     Lab Results   Component Value Date/Time    Bilirubin, total 0.3 12/18/2019 10:34 AM    ALT (SGPT) 34 12/18/2019 10:34 AM    AST (SGOT) 20 12/18/2019 10:34 AM    Alk. phosphatase 107 12/18/2019 10:34 AM    Protein, total 7.5 12/18/2019 10:34 AM    Albumin 3.2 (L) 12/18/2019 10:34 AM    Globulin 4.3 (H) 12/18/2019 10:34 AM     Records reviewed and summarized above. Pathology report(s) reviewed above. Radiology report(s) reviewed above. CT neck 12/3/19: IMPRESSION:   1. Extensive right cervical lymphadenopathy as previously described, with a  very slight interval decrease in size but continued heterogeneous enhancement  consistent with necrosis. There is no abscess formation or liquefaction,  however. 2. Stranding of surrounding fat which may be related to treatment and or venous  congestion. 3. Intraluminal thrombus in the right internal jugular vein, new since the prior  study    Assessment:   1) R tonsil SCC    CT neck and ENT exam reports reviewed  Per discussion with Dr. Dow Halo the tonsillar mass may be involving the pharyngeal wall  CT suggests multiple ipsilateral nodes , atleast one ~3  cm and none > 6 cm  PET CT shows no distant mets  HPV positive    Clinically at least T2N1- Though could be T3N1 - HPV postive- stage I-II  He does have a h/o smoking for 35 years    Surgery not recommended by ENT due to disease extent and likelihood of poor functional outcomes    He has an otherwise excellent PS     S/p 2 cycles of Cisplatin, toxicities include grade 1-2 nausea, grade 1 fatigue, and grade 2 mucositis (see below)    CT of neck obtained in ER 12/3 which showed very slight interval decrease in size of right cervical lymphadenopathy as well as a right internal jugular vein thrombus (see below)     He refused cycle 3 of Cisplatin. Completed RT 12/26/19. Has grade 1 mucositis but otherwise toxicities have resolved. Will plan to repeat PET 9-12 weeks after completion of RT. Will continue to support with weekly hydration for now. 2) H/O Smoking  Quit    3)  FH of Cancers  Should consider genetic counseling    4) Dysphagia and mucositis and oral thrush   Grade 1  Instructed him to swish and swallow magic mouthwash  Now requiring PEG tube, 6 cans/day   No recurrent thrush       5) Right IJ thrombus  On Xarelto-will need this until port is removed or for atleast 3 months which ever comes later    Plan:     · IVF today and weekly   · No longer requiring anti-emetics  · Magic mouthwash PRN; swish and swallow   · Liquid morphine PRN for pain however has not required this recently   · Continue feeds via PEG tube, 6 cans/day  · Weekly [facility] apts for IVF & OV   · Continue Xarelto for as long as PAC is in  · PET/CT 9-12 weeks after completion of radiation     [facility] apt weekly  RTC in 2 weeks for symptom assessment     Seen in conjunction with Boubacar Jean NP      I appreciate the opportunity to participate in Mr. Natalie clayton.     Signed By: Lexx Sage MD

## 2020-01-15 ENCOUNTER — HOSPITAL ENCOUNTER (OUTPATIENT)
Dept: INFUSION THERAPY | Age: 65
Discharge: HOME | End: 2020-01-15
Payer: COMMERCIAL

## 2020-01-15 VITALS
HEART RATE: 115 BPM | TEMPERATURE: 99.2 F | RESPIRATION RATE: 18 BRPM | SYSTOLIC BLOOD PRESSURE: 121 MMHG | DIASTOLIC BLOOD PRESSURE: 74 MMHG

## 2020-01-15 DIAGNOSIS — C09.9 TONSIL CANCER (HCC): Primary | ICD-10-CM

## 2020-01-15 PROCEDURE — 77030012965 HC NDL HUBR BBMI -A

## 2020-01-15 PROCEDURE — 74011250636 HC RX REV CODE- 250/636: Performed by: REGISTERED NURSE

## 2020-01-15 PROCEDURE — 96360 HYDRATION IV INFUSION INIT: CPT

## 2020-01-15 RX ADMIN — SODIUM CHLORIDE 1000 ML: 900 INJECTION, SOLUTION INTRAVENOUS at 09:46

## 2020-01-15 NOTE — PROGRESS NOTES
0940 Pt admit to [place] for Hydration ambulatory in stable condition. Assessment completed. No new concerns voiced. Port accessed and flushed with positive blood return. Visit Vitals  /74   Pulse (!) 115   Temp 99.2 °F (37.3 °C)   Resp 18       Medications:  Medications Administered     sodium chloride 0.9 % bolus infusion 1,000 mL     Admin Date  01/15/2020 Action  New Bag Dose  1000 mL Rate  1,000 mL/hr Route  IntraVENous Administered By  Akil Aguiar, RN                  4564 Pt tolerated treatment well. Port maintained positive blood return throughout treatment, flushed with positive blood return at conclusion and port heparinized and de-accessed per protocol. D/c home ambulatory in no distress. Pt aware of next [facility] appointment scheduled for 1/22/20.

## 2020-01-22 ENCOUNTER — HOSPITAL ENCOUNTER (OUTPATIENT)
Dept: INFUSION THERAPY | Age: 65
Discharge: HOME | End: 2020-01-22
Payer: COMMERCIAL

## 2020-01-22 ENCOUNTER — OFFICE VISIT (OUTPATIENT)
Dept: ONCOLOGY | Age: 65
End: 2020-01-22

## 2020-01-22 VITALS — DIASTOLIC BLOOD PRESSURE: 71 MMHG | HEART RATE: 104 BPM | SYSTOLIC BLOOD PRESSURE: 122 MMHG

## 2020-01-22 VITALS
SYSTOLIC BLOOD PRESSURE: 116 MMHG | OXYGEN SATURATION: 98 % | WEIGHT: 148.6 LBS | BODY MASS INDEX: 22.01 KG/M2 | TEMPERATURE: 97.9 F | DIASTOLIC BLOOD PRESSURE: 78 MMHG | HEIGHT: 69 IN

## 2020-01-22 DIAGNOSIS — C09.9 TONSIL CANCER (HCC): Primary | ICD-10-CM

## 2020-01-22 DIAGNOSIS — B37.0 ORAL THRUSH: ICD-10-CM

## 2020-01-22 PROCEDURE — 96360 HYDRATION IV INFUSION INIT: CPT

## 2020-01-22 PROCEDURE — 77030012965 HC NDL HUBR BBMI -A

## 2020-01-22 PROCEDURE — 74011250636 HC RX REV CODE- 250/636: Performed by: REGISTERED NURSE

## 2020-01-22 RX ORDER — FLUCONAZOLE 200 MG/1
200 TABLET ORAL DAILY
Qty: 7 TAB | Refills: 0 | Status: SHIPPED | OUTPATIENT
Start: 2020-01-22 | End: 2020-01-29

## 2020-01-22 RX ADMIN — SODIUM CHLORIDE 1000 ML: 900 INJECTION, SOLUTION INTRAVENOUS at 09:22

## 2020-01-22 NOTE — PROGRESS NOTES
[facility] [place] at [place] [address] [address], [address]  W: [phone]  F: [phone]    Reason for Visit:   Nicola Dale is a 60 y.o. 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 Size is measuring up to 2.5 cm in short axis. Right internal jugular vein is nearly fully compressed, no contralateral adenopathy seen. · 2019-ENT exam showed very inflamed tonsil on the right side with right Epley and surface, reportedly invading the pharyngeal wall-biopsy of the right tonsil showed invasive nonkeratinizing squamous cell carcinoma moderately differentiated  · 2019: PET CT Right tonsillar mass with hypermetabolic right cervical lymphadenopathy  · 19: cycle 1 cisplatin + RT  · 19: radiation completed     History of Present Illness:   Patient is a 60 y.o. male seen for SCC of the R tonsil    3 months ago he noted a small R neck swelling. Saw Dr. Jackson [facility]. Tried antibiotics for 10 days. Returned 19 and had an USG that showed adenopathy and then referred to Dr. Myranda Duncan. This led to above mentioned diagnosis. Pt comes today for follow-up and IV hydration. He completed radiation on 19. Continues to have some mouth soreness and dysphagia. He is eating very little by mouth and taking in 6 cans/day via PEG. His weight is stable today. He has increased secretions some days. Otherwise, has no complaints. He has no neck swelling and feels LN has continued to decreased in size. He has no bleeding on Xarelto.      He quit smoking 10 years ago, had smoked 1 ppd for 28 years    Sister had stage IV endometrial cancer in her 46s  Another sister  of ovarian cancer in her 62s     [address] and [address] reviewed above    [address]   Reviewed under HPI    [place]  Reviewed under HPI    Current Outpatient Medications   Medication Sig    rivaroxaban (XARELTO) 20 mg tab tablet Take 1 Tab by mouth daily (with breakfast).  nystatin (MYCOSTATIN) 100,000 unit/mL suspension Take 5 mL by mouth four (4) times daily. swish and swallow    pantoprazole (PROTONIX) 40 mg tablet Take 1 Tab by mouth daily.  saliva substitution (BIOTENE DRY MOUTH ORAL RINSE) mwsh mouthwash 15 mL by Mucous Membrane route as needed.  cyanocobalamin 1,000 mcg tablet Take 1,000 mcg by mouth daily.  scopolamine (TRANSDERM-SCOP) 1 mg over 3 days pt3d 1 Patch by TransDERmal route every seventy-two (72) hours.  methylPREDNISolone (MEDROL) 8 mg tablet Take 5 Tabs by mouth See Admin Instructions. Take 5 tabs by mouth (40mg) on days 2&3 of chemotherapy    nut tx, lact-reduced, iron (BOOST VHC) 0.09-2.25 gram-kcal/mL liqd 6 Cans by Per G Tube route daily.  baclofen (LIORESAL) 10 mg tablet Take 1 Tab by mouth three (3) times daily. (Patient taking differently: Take 10 mg by mouth three (3) times daily as needed for Other (Hiccups). )    aluminum-magnesium hydroxide 200-200 mg/5 mL susp 5 mL, diphenhydrAMINE 12.5 mg/5 mL liqd 12.5 mg, lidocaine 2 % soln 5 mL 5 mL by Swish and Spit route two (2) times a day. Maalox  Lidocaine 2% viscous   Diphenhydramine oral solution     Pharmacy to mix equal portions of ingredients to a total volume as indicated in the dispense amount. Swish&spit 5mL (1 tsp) 4 times daily as needed    ondansetron (ZOFRAN ODT) 8 mg disintegrating tablet Take 1 Tab by mouth every eight (8) hours as needed for Nausea.  hydroCHLOROthiazide (HYDRODIURIL) 12.5 mg tablet Take 12.5 mg by mouth daily.  atorvastatin (LIPITOR) 20 mg tablet Take  by mouth daily.  multivitamin (ONE A DAY) tablet Take 1 Tab by mouth daily. No current facility-administered medications for this visit.        No Known Allergies     Review of Systems: A complete review of systems was obtained, negative except as described above.    Physical Exam:     Visit Vitals  /78 (BP 1 Location: Right arm)   Temp 97.9 °F (36.6 °C)   Ht 5' 9\" (1.753 m)   Wt 148 lb 9.6 oz (67.4 kg)   SpO2 98%   BMI 21.94 kg/m²     ECOG PS: 1  General: No distress  Eyes: PERRLA, anicteric sclerae  Neck: Supple, PAC site looks good, not accessed today   Lymphatic: L cervical node barely palpable  Respiratory: CTAB, normal respiratory effort  CV: Normal rate, regular rhythm, no murmurs, no peripheral edema  GI: Soft, nontender, nondistended, no masses, no hepatomegaly, no splenomegaly, PEG tube in place  MS: Normal gait and station. Digits without clubbing or cyanosis. Skin: No rashes, ecchymoses, or petechiae. Normal temperature, turgor, and texture. Psych: Alert, oriented, appropriate affect, normal judgment/insight    Results:     Lab Results   Component Value Date/Time    WBC 4.4 01/08/2020 10:26 AM    HGB 7.3 (L) 01/08/2020 10:26 AM    HCT 22.9 (L) 01/08/2020 10:26 AM    PLATELET 228 19/90/1463 10:26 AM    MCV 73.2 (L) 01/08/2020 10:26 AM    ABS. NEUTROPHILS 2.9 01/08/2020 10:26 AM     Lab Results   Component Value Date/Time    Sodium 143 01/08/2020 10:26 AM    Potassium 4.2 01/08/2020 10:26 AM    Chloride 111 (H) 01/08/2020 10:26 AM    CO2 28 01/08/2020 10:26 AM    Glucose 97 01/08/2020 10:26 AM    BUN 17 01/08/2020 10:26 AM    Creatinine 0.64 (L) 01/08/2020 10:26 AM    GFR est AA >60 01/08/2020 10:26 AM    GFR est non-AA >60 01/08/2020 10:26 AM    Calcium 8.3 (L) 01/08/2020 10:26 AM     Lab Results   Component Value Date/Time    Bilirubin, total 0.2 01/08/2020 10:26 AM    ALT (SGPT) 18 01/08/2020 10:26 AM    AST (SGOT) 13 (L) 01/08/2020 10:26 AM    Alk. phosphatase 81 01/08/2020 10:26 AM    Protein, total 5.7 (L) 01/08/2020 10:26 AM    Albumin 2.8 (L) 01/08/2020 10:26 AM    Globulin 2.9 01/08/2020 10:26 AM     Records reviewed and summarized above. Pathology report(s) reviewed above. Radiology report(s) reviewed above.     CT neck 12/3/19: IMPRESSION:   1. Extensive right cervical lymphadenopathy as previously described, with a  very slight interval decrease in size but continued heterogeneous enhancement  consistent with necrosis. There is no abscess formation or liquefaction,  however. 2. Stranding of surrounding fat which may be related to treatment and or venous  congestion. 3. Intraluminal thrombus in the right internal jugular vein, new since the prior  study    Assessment:   1) R tonsil SCC  Per discussion with Dr. Lo Arrant the tonsillar mass may be involving the pharyngeal wall  CT suggests multiple ipsilateral nodes , atleast one ~3  cm and none > 6 cm  PET CT shows no distant mets  HPV positive    Clinically at least T2N1- Though could be T3N1 - HPV postive- stage I-II    Completed 2 cycles of Cisplatin (pt refused 3rd cycle) and completed RT on 12/26/19. Has grade 1 mucositis but otherwise toxicities have resolved. Will plan to repeat PET 9-12 weeks after completion of RT. Will continue to support with weekly hydration for now. CT of neck obtained in ER 12/3 which showed very slight interval decrease in size of right cervical lymphadenopathy as well as a right internal jugular vein thrombus (see below).       2) H/O Smoking  Quit    3)  FH of Cancers  Should consider genetic counseling    4) Dysphagia and mucositis and oral thrush   Grade 1  Instructed him to swish and swallow magic mouthwash  Continue using PEG tube, 6 cans/day   Swallow test pending, will f/u with scheduling   No OP thrush noted, will give fluconazole as he may have esophageal thrush   Ok to give fluconazole with Xarelto, d/w pharmacy     5) Right IJ thrombus  On Xarelto-will need this until port is removed or for atleast 3 months which ever comes later  Started 12/3/19     Plan:     · IVF today and weekly   · Magic mouthwash PRN; swish and swallow   · Continue feeds via PEG tube, 6 cans/day  · Weekly [facility] apts for IVF  · Continue Xarelto for as long as PAC is in  · Swallow study   · Fluconazole for possible oral thrush   · PET/CT 9-12 weeks after completion of radiation     [facility] apt weekly  RTC in 2 weeks for symptom assessment     Seen in conjunction with Rodriguez Reed NP    I appreciate the opportunity to participate in Mr. Magnolia clayton.     Signed By: Vero Mckinley MD

## 2020-01-22 NOTE — PROGRESS NOTES
Rhys Conner is a 60 y.o. 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 Have you been to the ER, urgent care clinic since your last visit? Hospitalized since your last visit? No  2. Have you seen or consulted any other health care providers outside of the [address] [place] [address] since your last visit? Include any pap smears or colon screening.   No

## 2020-01-22 NOTE — Clinical Note
Giovana-this pt has a swallow study ordered as well, are you able to assist in scheduling? He prefers [facility] if available. Thanks!

## 2020-01-29 ENCOUNTER — HOSPITAL ENCOUNTER (OUTPATIENT)
Dept: INFUSION THERAPY | Age: 65
Discharge: HOME | End: 2020-01-29
Payer: COMMERCIAL

## 2020-01-29 VITALS
TEMPERATURE: 99.4 F | RESPIRATION RATE: 18 BRPM | SYSTOLIC BLOOD PRESSURE: 118 MMHG | HEART RATE: 115 BPM | DIASTOLIC BLOOD PRESSURE: 67 MMHG | OXYGEN SATURATION: 100 %

## 2020-01-29 DIAGNOSIS — C09.9 TONSIL CANCER (HCC): Primary | ICD-10-CM

## 2020-01-29 PROCEDURE — 74011250636 HC RX REV CODE- 250/636: Performed by: REGISTERED NURSE

## 2020-01-29 PROCEDURE — 96360 HYDRATION IV INFUSION INIT: CPT

## 2020-01-29 PROCEDURE — 74011250636 HC RX REV CODE- 250/636: Performed by: INTERNAL MEDICINE

## 2020-01-29 PROCEDURE — 77030012965 HC NDL HUBR BBMI -A

## 2020-01-29 RX ORDER — SODIUM CHLORIDE 0.9 % (FLUSH) 0.9 %
5-10 SYRINGE (ML) INJECTION AS NEEDED
Status: DISCONTINUED | OUTPATIENT
Start: 2020-01-29 | End: 2020-01-30 | Stop reason: HOSPADM

## 2020-01-29 RX ORDER — HEPARIN 100 UNIT/ML
500 SYRINGE INTRAVENOUS AS NEEDED
Status: DISCONTINUED | OUTPATIENT
Start: 2020-01-29 | End: 2020-01-30 | Stop reason: HOSPADM

## 2020-01-29 RX ORDER — SODIUM CHLORIDE 9 MG/ML
10 INJECTION INTRAMUSCULAR; INTRAVENOUS; SUBCUTANEOUS AS NEEDED
Status: DISCONTINUED | OUTPATIENT
Start: 2020-01-29 | End: 2020-01-30 | Stop reason: HOSPADM

## 2020-01-29 RX ADMIN — Medication 10 ML: at 11:39

## 2020-01-29 RX ADMIN — SODIUM CHLORIDE 10 ML: 9 INJECTION INTRAMUSCULAR; INTRAVENOUS; SUBCUTANEOUS at 10:37

## 2020-01-29 RX ADMIN — Medication 500 UNITS: at 11:39

## 2020-01-29 RX ADMIN — SODIUM CHLORIDE 1000 ML: 900 INJECTION, SOLUTION INTRAVENOUS at 10:39

## 2020-01-29 NOTE — PROGRESS NOTES
[facility] Short Note                       Date: 2020    Name: Eri Hansen    MRN: [id number]         : 1955      1030 Pt admit to [facility] for hydration ambulatory in stable condition. Assessment completed. No new concerns voiced. Port accessed with positive blood return and connected to NS for hydration. Mr. Betty Espinoza vitals were reviewed prior to and after treatment. Patient Vitals for the past 12 hrs:   Temp Pulse Resp BP SpO2   20 1032 99.4 °F (37.4 °C) (!) 115 18 118/67 100 %     Medications given:   Medications Administered     0.9% sodium chloride injection 10 mL     Admin Date  2020 Action  Given Dose  10 mL Route  IntraVENous Administered By  Efe Corral RN          heparin (porcine) pf 500 Units     Admin Date  2020 Action  Given Dose  500 Units Route  IntraVENous Administered By  Efe Corral RN          sodium chloride (NS) flush 5-10 mL     Admin Date  2020 Action  Given Dose  10 mL Route  IntraVENous Administered By  Efe Corral RN          sodium chloride 0.9 % bolus infusion 1,000 mL     Admin Date  2020 Action  New Bag Dose  1000 mL Rate  1,000 mL/hr Route  IntraVENous Administered By  Efe Corral RN                Port maintained positive blood return. Port flushed, heparinized and de-accessed per protocol. Mr. Alcides Myers tolerated the infusion, had no complaints, and was discharged from [place] [address] in stable condition at 1140.      Future Appointments   Date Time Provider Mayda Miguel   2020  9:00 AM [facility]   2020  9:45 AM Yrn Hillman  [address], RN  2020  4:24 PM

## 2020-02-05 ENCOUNTER — HOSPITAL ENCOUNTER (OUTPATIENT)
Dept: INFUSION THERAPY | Age: 65
Discharge: HOME | End: 2020-02-05
Payer: COMMERCIAL

## 2020-02-05 ENCOUNTER — OFFICE VISIT (OUTPATIENT)
Dept: ONCOLOGY | Age: 65
End: 2020-02-05

## 2020-02-05 VITALS
TEMPERATURE: 97.3 F | SYSTOLIC BLOOD PRESSURE: 126 MMHG | OXYGEN SATURATION: 98 % | RESPIRATION RATE: 18 BRPM | DIASTOLIC BLOOD PRESSURE: 79 MMHG | HEART RATE: 109 BPM

## 2020-02-05 VITALS
HEART RATE: 93 BPM | SYSTOLIC BLOOD PRESSURE: 115 MMHG | HEIGHT: 69 IN | BODY MASS INDEX: 21.56 KG/M2 | WEIGHT: 145.6 LBS | OXYGEN SATURATION: 98 % | TEMPERATURE: 98 F | DIASTOLIC BLOOD PRESSURE: 69 MMHG

## 2020-02-05 DIAGNOSIS — R13.10 DYSPHAGIA, UNSPECIFIED TYPE: ICD-10-CM

## 2020-02-05 DIAGNOSIS — C76.0 HEAD AND NECK CANCER (HCC): ICD-10-CM

## 2020-02-05 DIAGNOSIS — C09.9 TONSIL CANCER (HCC): Primary | ICD-10-CM

## 2020-02-05 DIAGNOSIS — I82.90 ACUTE DEEP VEIN THROMBOSIS (DVT) OF NON-EXTREMITY VEIN: ICD-10-CM

## 2020-02-05 PROCEDURE — 74011250636 HC RX REV CODE- 250/636: Performed by: INTERNAL MEDICINE

## 2020-02-05 PROCEDURE — 96360 HYDRATION IV INFUSION INIT: CPT

## 2020-02-05 PROCEDURE — 74011250636 HC RX REV CODE- 250/636: Performed by: REGISTERED NURSE

## 2020-02-05 PROCEDURE — 77030012965 HC NDL HUBR BBMI -A

## 2020-02-05 RX ORDER — HEPARIN 100 UNIT/ML
500 SYRINGE INTRAVENOUS AS NEEDED
Status: CANCELLED | OUTPATIENT
Start: 2020-04-23

## 2020-02-05 RX ORDER — SODIUM CHLORIDE 9 MG/ML
10 INJECTION INTRAMUSCULAR; INTRAVENOUS; SUBCUTANEOUS AS NEEDED
Status: CANCELLED | OUTPATIENT
Start: 2020-04-23

## 2020-02-05 RX ORDER — SODIUM CHLORIDE 9 MG/ML
10 INJECTION INTRAMUSCULAR; INTRAVENOUS; SUBCUTANEOUS AS NEEDED
Status: DISCONTINUED | OUTPATIENT
Start: 2020-02-05 | End: 2020-02-06 | Stop reason: HOSPADM

## 2020-02-05 RX ORDER — HEPARIN 100 UNIT/ML
500 SYRINGE INTRAVENOUS AS NEEDED
Status: DISCONTINUED | OUTPATIENT
Start: 2020-02-05 | End: 2020-02-06 | Stop reason: HOSPADM

## 2020-02-05 RX ORDER — SODIUM CHLORIDE 0.9 % (FLUSH) 0.9 %
5-10 SYRINGE (ML) INJECTION AS NEEDED
Status: CANCELLED | OUTPATIENT
Start: 2020-04-23

## 2020-02-05 RX ORDER — SODIUM CHLORIDE 0.9 % (FLUSH) 0.9 %
5-10 SYRINGE (ML) INJECTION AS NEEDED
Status: DISCONTINUED | OUTPATIENT
Start: 2020-02-05 | End: 2020-02-06 | Stop reason: HOSPADM

## 2020-02-05 RX ADMIN — SODIUM CHLORIDE 1000 ML: 900 INJECTION, SOLUTION INTRAVENOUS at 09:27

## 2020-02-05 RX ADMIN — SODIUM CHLORIDE, PRESERVATIVE FREE 500 UNITS: 5 INJECTION INTRAVENOUS at 10:37

## 2020-02-05 NOTE — PROGRESS NOTES
Jj Rahman is a 60 y.o. 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 Have you been to the ER, urgent care clinic since your last visit? Hospitalized since your last visit? No  2. Have you seen or consulted any other health care providers outside of the [address] [place] [address] since your last visit? Include any pap smears or colon screening.  No

## 2020-02-05 NOTE — PROGRESS NOTES
[facility] [place] at [address], [address] [place] [address] Road, [address] [place] [address] Parul  W: [phone]  F: [phone]    Reason for Visit:   Parag Melo is a 60 y.o. 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 Size is measuring up to 2.5 cm in short axis. Right internal jugular vein is nearly fully compressed, no contralateral adenopathy seen. · 9/5/2019-ENT exam showed very inflamed tonsil on the right side with right Epley and surface, reportedly invading the pharyngeal wall-biopsy of the right tonsil showed invasive nonkeratinizing squamous cell carcinoma moderately differentiated  · 9/2019: PET CT Right tonsillar mass with hypermetabolic right cervical lymphadenopathy  · 11/6/19: cycle 1 cisplatin + RT  · 12/27/19: radiation completed     History of Present Illness:   Patient is a 60 y.o. male seen for SCC of the R tonsil    3 months ago he noted a small R neck swelling. Saw Dr. Jackson [facility]. Tried antibiotics for 10 days. Returned 8/9/19 and had an USG that showed adenopathy and then referred to Dr. Jacob Miles. This led to above mentioned diagnosis. Pt comes today for follow-up and IV hydration. He completed radiation on 12/27/19. He is feeling better today. He was given a course of steroids due to ongoing neck swelling secondary to RT and feels much better after this. Has no mouth sores. Dysphagia has improved however he wishes to still obtain swallow study. He is eating very little by mouth and taking in 6 cans/day via PEG. He is hoping to start PO intake soon as food smells good to him now. His weight is stable today. He has increased secretions some days. Otherwise, has no complaints. He has no bleeding on Xarelto.      He quit smoking 10 years ago, had smoked 1 ppd for 28 years    Sister had stage IV endometrial cancer in her 46s  Another sister  of ovarian cancer in her 62s     [address] and [address] reviewed above    [address]   Reviewed under HPI      Reviewed under HPI    Current Outpatient Medications   Medication Sig    rivaroxaban (XARELTO) 20 mg tab tablet Take 1 Tab by mouth daily (with breakfast).  nystatin (MYCOSTATIN) 100,000 unit/mL suspension Take 5 mL by mouth four (4) times daily. swish and swallow    pantoprazole (PROTONIX) 40 mg tablet Take 1 Tab by mouth daily.  saliva substitution (BIOTENE DRY MOUTH ORAL RINSE) mwsh mouthwash 15 mL by Mucous Membrane route as needed.  cyanocobalamin 1,000 mcg tablet Take 1,000 mcg by mouth daily.  scopolamine (TRANSDERM-SCOP) 1 mg over 3 days pt3d 1 Patch by TransDERmal route every seventy-two (72) hours.  methylPREDNISolone (MEDROL) 8 mg tablet Take 5 Tabs by mouth See Admin Instructions. Take 5 tabs by mouth (40mg) on days 2&3 of chemotherapy    nut tx, lact-reduced, iron (BOOST VHC) 0.09-2.25 gram-kcal/mL liqd 6 Cans by Per G Tube route daily.  baclofen (LIORESAL) 10 mg tablet Take 1 Tab by mouth three (3) times daily. (Patient taking differently: Take 10 mg by mouth three (3) times daily as needed for Other (Hiccups). )    aluminum-magnesium hydroxide 200-200 mg/5 mL susp 5 mL, diphenhydrAMINE 12.5 mg/5 mL liqd 12.5 mg, lidocaine 2 % soln 5 mL 5 mL by Swish and Spit route two (2) times a day. Maalox  Lidocaine 2% viscous   Diphenhydramine oral solution     Pharmacy to mix equal portions of ingredients to a total volume as indicated in the dispense amount. Swish&spit 5mL (1 tsp) 4 times daily as needed    ondansetron (ZOFRAN ODT) 8 mg disintegrating tablet Take 1 Tab by mouth every eight (8) hours as needed for Nausea.  hydroCHLOROthiazide (HYDRODIURIL) 12.5 mg tablet Take 12.5 mg by mouth daily.  atorvastatin (LIPITOR) 20 mg tablet Take  by mouth daily.  multivitamin (ONE A DAY) tablet Take 1 Tab by mouth daily.      No current facility-administered medications for this visit. Facility-Administered Medications Ordered in Other Visits   Medication Dose Route Frequency    heparin (porcine) pf 500 Units  500 Units IntraVENous PRN    sodium chloride (NS) flush 5-10 mL  5-10 mL IntraVENous PRN    0.9% sodium chloride injection 10 mL  10 mL IntraVENous PRN      No Known Allergies     Review of Systems: A complete review of systems was obtained, negative except as described above. Physical Exam:     Visit Vitals  /69 (BP 1 Location: Right arm)   Pulse 93   Temp 98 °F (36.7 °C)   Ht 5' 9\" (1.753 m)   Wt 145 lb 9.6 oz (66 kg)   SpO2 98%   BMI 21.50 kg/m²     ECOG PS: 1  General: No distress  Eyes: PERRLA, anicteric sclerae  Neck: Supple, PAC site looks good, not accessed today   Lymphatic: L cervical node barely palpable  Respiratory: CTAB, normal respiratory effort  CV: Normal rate, regular rhythm, no murmurs, no peripheral edema  GI: Soft, nontender, nondistended, no masses, no hepatomegaly, no splenomegaly, PEG tube in place  MS: Normal gait and station. Digits without clubbing or cyanosis. Skin: No rashes, ecchymoses, or petechiae. Normal temperature, turgor, and texture. Psych: Alert, oriented, appropriate affect, normal judgment/insight    Results:     Lab Results   Component Value Date/Time    WBC 4.4 01/08/2020 10:26 AM    HGB 7.3 (L) 01/08/2020 10:26 AM    HCT 22.9 (L) 01/08/2020 10:26 AM    PLATELET 439 38/38/5564 10:26 AM    MCV 73.2 (L) 01/08/2020 10:26 AM    ABS.  NEUTROPHILS 2.9 01/08/2020 10:26 AM     Lab Results   Component Value Date/Time    Sodium 143 01/08/2020 10:26 AM    Potassium 4.2 01/08/2020 10:26 AM    Chloride 111 (H) 01/08/2020 10:26 AM    CO2 28 01/08/2020 10:26 AM    Glucose 97 01/08/2020 10:26 AM    BUN 17 01/08/2020 10:26 AM    Creatinine 0.64 (L) 01/08/2020 10:26 AM    GFR est AA >60 01/08/2020 10:26 AM    GFR est non-AA >60 01/08/2020 10:26 AM    Calcium 8.3 (L) 01/08/2020 10:26 AM     Lab Results Component Value Date/Time    Bilirubin, total 0.2 01/08/2020 10:26 AM    ALT (SGPT) 18 01/08/2020 10:26 AM    AST (SGOT) 13 (L) 01/08/2020 10:26 AM    Alk. phosphatase 81 01/08/2020 10:26 AM    Protein, total 5.7 (L) 01/08/2020 10:26 AM    Albumin 2.8 (L) 01/08/2020 10:26 AM    Globulin 2.9 01/08/2020 10:26 AM     Records reviewed and summarized above. Pathology report(s) reviewed above. Radiology report(s) reviewed above. CT neck 12/3/19: IMPRESSION:   1. Extensive right cervical lymphadenopathy as previously described, with a  very slight interval decrease in size but continued heterogeneous enhancement  consistent with necrosis. There is no abscess formation or liquefaction,  however. 2. Stranding of surrounding fat which may be related to treatment and or venous  congestion. 3. Intraluminal thrombus in the right internal jugular vein, new since the prior  study    Assessment:   1) R tonsil SCC  Per discussion with Dr. Андрей Segura the tonsillar mass may be involving the pharyngeal wall  CT suggests multiple ipsilateral nodes , atleast one ~3  cm and none > 6 cm  PET CT shows no distant mets  HPV positive    Clinically at least T2N1- Though could be T3N1 - HPV postive- stage I-II    Completed 2 cycles of Cisplatin (pt refused 3rd cycle) and completed RT on 12/26/19. Continues to have mild dysphagia but otherwise toxicities have resolved. Will plan to repeat PET 9-12 weeks after completion of RT (due early March). He wishes to stop hydration now.      2) H/O Smoking  Quit    3)  FH of Cancers  Should consider genetic counseling    4) Dysphagia  Grade 1  Instructed him to swish and swallow magic mouthwash  Continue using PEG tube, 6 cans/day   Swallow test pending, will f/u with scheduling   No OP thrush noted or lesions     5) Right IJ thrombus  On Xarelto-will need this until port is removed or for atleast 3 months which ever comes later  Started 12/3/19     Plan:     · IVF today but then stop at pt request · Magic mouthwash PRN; swish and swallow   · Continue feeds via PEG tube, 6 cans/day  · Continue Xarelto for as long as PAC is in  · Swallow study   · PET/CT 9-12 weeks after completion of radiation, due early/mid March    RTC in 6 weeks for port flush and PET review or sooner if indicated     Seen in conjunction with Kasia Valentin NP    I appreciate the opportunity to participate in [facility]. [facility].     Signed By: Paulino Sainz MD

## 2020-02-05 NOTE — PROGRESS NOTES
Outpatient Infusion Center Progress Note    2291 Pt admit to [facility] for Hydration ambulatory in stable condition. Assessment completed. No new concerns voiced. Port accessed with positive blood return. Patient connected to NS per orders. Visit Vitals  /79 (BP 1 Location: Right arm, BP Patient Position: Sitting)   Pulse (!) 109   Temp 97.3 °F (36.3 °C)   Resp 18   SpO2 98%       Medications Administered     sodium chloride 0.9 % bolus infusion 1,000 mL     Admin Date  02/05/2020 Action  New Bag Dose  1000 mL Rate  1,000 mL/hr Route  IntraVENous Administered By  Odin Trevin                1030 Pt tolerated treatment well. Port maintained positive blood return throughout the course of treatment. Port was flushed, heparinized and de-accessed per protocol. D/c home ambulatory in no distress. This was patient's last hydration appointment.      Colten Miller RN

## 2020-02-17 DIAGNOSIS — R06.6 HICCUPS: ICD-10-CM

## 2020-02-17 RX ORDER — BACLOFEN 10 MG/1
10 TABLET ORAL
Qty: 21 TAB | Refills: 0 | Status: SHIPPED | OUTPATIENT
Start: 2020-02-17 | End: 2020-03-10 | Stop reason: SDUPTHER

## 2020-02-18 ENCOUNTER — HOSPITAL ENCOUNTER (OUTPATIENT)
Dept: CT IMAGING | Age: 65
Discharge: HOME | End: 2020-02-18
Attending: RADIOLOGY
Payer: COMMERCIAL

## 2020-02-18 DIAGNOSIS — C09.0 MALIGNANT NEOPLASM OF TONSILLAR FOSSA (HCC): ICD-10-CM

## 2020-02-18 PROCEDURE — 74011636320 HC RX REV CODE- 636/320: Performed by: RADIOLOGY

## 2020-02-18 PROCEDURE — 74011000258 HC RX REV CODE- 258: Performed by: RADIOLOGY

## 2020-02-18 PROCEDURE — 70491 CT SOFT TISSUE NECK W/DYE: CPT

## 2020-02-18 RX ORDER — SODIUM CHLORIDE 0.9 % (FLUSH) 0.9 %
10 SYRINGE (ML) INJECTION
Status: COMPLETED | OUTPATIENT
Start: 2020-02-18 | End: 2020-02-18

## 2020-02-18 RX ADMIN — IOPAMIDOL 100 ML: 612 INJECTION, SOLUTION INTRAVENOUS at 12:49

## 2020-02-18 RX ADMIN — Medication 10 ML: at 12:49

## 2020-02-18 RX ADMIN — SODIUM CHLORIDE 100 ML: 900 INJECTION, SOLUTION INTRAVENOUS at 12:49

## 2020-03-09 ENCOUNTER — TELEPHONE (OUTPATIENT)
Dept: ONCOLOGY | Age: 65
End: 2020-03-09

## 2020-03-09 DIAGNOSIS — R06.6 HICCUPS: ICD-10-CM

## 2020-03-09 DIAGNOSIS — C09.9 TONSIL CANCER (HCC): ICD-10-CM

## 2020-03-09 NOTE — TELEPHONE ENCOUNTER
p2p completed for PET scan. MD states approval [facility] code will be available by tomorrow.    PET is not scheduled--please ensure this is scheduled prior to [address] on 3/19

## 2020-03-10 ENCOUNTER — TELEPHONE (OUTPATIENT)
Dept: ONCOLOGY | Age: 65
End: 2020-03-10

## 2020-03-10 RX ORDER — PANTOPRAZOLE SODIUM 40 MG/1
40 TABLET, DELAYED RELEASE ORAL DAILY
Qty: 30 TAB | Refills: 2 | Status: SHIPPED | OUTPATIENT
Start: 2020-03-10 | End: 2020-11-16

## 2020-03-10 RX ORDER — BACLOFEN 10 MG/1
10 TABLET ORAL
Qty: 21 TAB | Refills: 0 | Status: SHIPPED | OUTPATIENT
Start: 2020-03-10 | End: 2020-11-16

## 2020-03-10 NOTE — TELEPHONE ENCOUNTER
Called patient and confirmed x2 identifiers   Informed patient that Peer to Peer was completed and approved   Provided scheduling number     Patient verbalized understanding   No new questions or concerns at this time

## 2020-03-13 ENCOUNTER — HOSPITAL ENCOUNTER (OUTPATIENT)
Dept: PET IMAGING | Age: 65
Discharge: HOME | End: 2020-03-13
Attending: REGISTERED NURSE
Payer: COMMERCIAL

## 2020-03-13 VITALS — WEIGHT: 140 LBS | HEIGHT: 69 IN | BODY MASS INDEX: 20.73 KG/M2

## 2020-03-13 DIAGNOSIS — C09.9 TONSIL CANCER (HCC): ICD-10-CM

## 2020-03-13 PROCEDURE — A9552 F18 FDG: HCPCS

## 2020-03-13 RX ORDER — SODIUM CHLORIDE 0.9 % (FLUSH) 0.9 %
10 SYRINGE (ML) INJECTION
Status: COMPLETED | OUTPATIENT
Start: 2020-03-13 | End: 2020-03-13

## 2020-03-13 RX ADMIN — Medication 10 ML: at 07:50

## 2020-03-18 ENCOUNTER — TELEPHONE (OUTPATIENT)
Dept: ONCOLOGY | Age: 65
End: 2020-03-18

## 2020-03-18 DIAGNOSIS — C09.9 TONSIL CANCER (HCC): Primary | ICD-10-CM

## 2020-03-18 NOTE — TELEPHONE ENCOUNTER
PET CT shows no cancer!  That is great    He needs to see me in 3 months with cbc, cmp, TSH in [facility]  He can get his port out   He should follow with Dr. Óscar Peace for exams

## 2020-03-19 NOTE — TELEPHONE ENCOUNTER
Patient returned my call. Verbalized understanding of PET scan results. To avoid exposure to COVID-19 we will cancel his appointment today. He feels great and wants PEG & port removed. Will RTC in 3 months with labs which need to be mailed to his house.

## 2020-03-30 ENCOUNTER — HOSPITAL ENCOUNTER (OUTPATIENT)
Dept: INTERVENTIONAL RADIOLOGY/VASCULAR | Age: 65
Discharge: HOME | End: 2020-03-30
Attending: RADIOLOGY | Admitting: RADIOLOGY

## 2020-03-30 VITALS
TEMPERATURE: 98.9 F | OXYGEN SATURATION: 100 % | HEART RATE: 110 BPM | DIASTOLIC BLOOD PRESSURE: 64 MMHG | SYSTOLIC BLOOD PRESSURE: 133 MMHG | RESPIRATION RATE: 20 BRPM

## 2020-03-30 DIAGNOSIS — C09.9 TONSIL CANCER (HCC): ICD-10-CM

## 2020-04-01 ENCOUNTER — HOSPITAL ENCOUNTER (OUTPATIENT)
Dept: INTERVENTIONAL RADIOLOGY/VASCULAR | Age: 65
Discharge: HOME | End: 2020-04-01
Attending: RADIOLOGY | Admitting: RADIOLOGY
Payer: COMMERCIAL

## 2020-04-01 VITALS
TEMPERATURE: 98.3 F | DIASTOLIC BLOOD PRESSURE: 66 MMHG | OXYGEN SATURATION: 100 % | SYSTOLIC BLOOD PRESSURE: 119 MMHG | RESPIRATION RATE: 10 BRPM | HEART RATE: 85 BPM

## 2020-04-01 DIAGNOSIS — C09.9 TONSIL CANCER (HCC): ICD-10-CM

## 2020-04-01 PROCEDURE — 36590 REMOVAL TUNNELED CV CATH: CPT

## 2020-04-01 PROCEDURE — 74011250636 HC RX REV CODE- 250/636: Performed by: RADIOLOGY

## 2020-04-01 PROCEDURE — 77030010507 HC ADH SKN DERMBND J&J -B

## 2020-04-01 PROCEDURE — 77030031139 HC SUT VCRL2 J&J -A

## 2020-04-01 PROCEDURE — 77030011893 HC TY CUT DN TRIS -B

## 2020-04-01 PROCEDURE — 74011000250 HC RX REV CODE- 250: Performed by: RADIOLOGY

## 2020-04-01 RX ORDER — MIDAZOLAM HYDROCHLORIDE 1 MG/ML
5 INJECTION, SOLUTION INTRAMUSCULAR; INTRAVENOUS
Status: DISCONTINUED | OUTPATIENT
Start: 2020-04-01 | End: 2020-04-01

## 2020-04-01 RX ORDER — SODIUM CHLORIDE 0.9 % (FLUSH) 0.9 %
10 SYRINGE (ML) INJECTION AS NEEDED
Status: DISCONTINUED | OUTPATIENT
Start: 2020-04-01 | End: 2020-04-01

## 2020-04-01 RX ORDER — LIDOCAINE HYDROCHLORIDE AND EPINEPHRINE 10; 10 MG/ML; UG/ML
1.5 INJECTION, SOLUTION INFILTRATION; PERINEURAL ONCE
Status: COMPLETED | OUTPATIENT
Start: 2020-04-01 | End: 2020-04-01

## 2020-04-01 RX ORDER — FENTANYL CITRATE 50 UG/ML
200 INJECTION, SOLUTION INTRAMUSCULAR; INTRAVENOUS
Status: DISCONTINUED | OUTPATIENT
Start: 2020-04-01 | End: 2020-04-01

## 2020-04-01 RX ORDER — SODIUM CHLORIDE 9 MG/ML
500 INJECTION, SOLUTION INTRAVENOUS CONTINUOUS
Status: DISCONTINUED | OUTPATIENT
Start: 2020-04-01 | End: 2020-04-01

## 2020-04-01 RX ORDER — LIDOCAINE HYDROCHLORIDE 20 MG/ML
20 INJECTION, SOLUTION INFILTRATION; PERINEURAL ONCE
Status: COMPLETED | OUTPATIENT
Start: 2020-04-01 | End: 2020-04-01

## 2020-04-01 RX ADMIN — FENTANYL CITRATE 25 MCG: 50 INJECTION INTRAMUSCULAR; INTRAVENOUS at 12:51

## 2020-04-01 RX ADMIN — LIDOCAINE HYDROCHLORIDE,EPINEPHRINE BITARTRATE 15 MG: 10; .01 INJECTION, SOLUTION INFILTRATION; PERINEURAL at 12:54

## 2020-04-01 RX ADMIN — MIDAZOLAM 1 MG: 1 INJECTION INTRAMUSCULAR; INTRAVENOUS at 12:51

## 2020-04-01 RX ADMIN — LIDOCAINE HYDROCHLORIDE 200 MG: 20 INJECTION, SOLUTION INFILTRATION; PERINEURAL at 12:54

## 2020-04-01 RX ADMIN — FENTANYL CITRATE 25 MCG: 50 INJECTION INTRAMUSCULAR; INTRAVENOUS at 13:03

## 2020-04-01 RX ADMIN — SODIUM CHLORIDE 500 ML: 900 INJECTION, SOLUTION INTRAVENOUS at 12:48

## 2020-04-01 RX ADMIN — MIDAZOLAM 1 MG: 1 INJECTION INTRAMUSCULAR; INTRAVENOUS at 12:43

## 2020-04-01 RX ADMIN — FENTANYL CITRATE 50 MCG: 50 INJECTION INTRAMUSCULAR; INTRAVENOUS at 12:43

## 2020-04-01 NOTE — H&P
Radiology History and Physical    Patient: Luis Fernando Winkler 63 y.o. male       Chief Complaint: No chief complaint on file. History of Present Illness: port removal    History:    Past Medical History:   Diagnosis Date    Cancer ([address]     Throat     No family history on file.   Social History     Socioeconomic History    Marital status:      Spouse name: Not on file    Number of children: Not on file    Years of education: Not on file    Highest education level: Not on file   Occupational History    Not on file   Social Needs    Financial resource strain: Not on file    Food insecurity     Worry: Not on file     Inability: Not on file    Transportation needs     Medical: Not on file     Non-medical: Not on file   Tobacco Use    Smoking status: Former Smoker    Smokeless tobacco: Never Used   Substance and Sexual Activity    Alcohol use: Not on file     Comment: Socially    Drug use: Never    Sexual activity: Not on file   Lifestyle    Physical activity     Days per week: Not on file     Minutes per session: Not on file    Stress: Not on file   Relationships    Social connections     Talks on phone: Not on file     Gets together: Not on file     Attends Evangelical service: Not on file     Active member of club or organization: Not on file     Attends meetings of clubs or organizations: Not on file     Relationship status: Not on file    Intimate partner violence     Fear of current or ex partner: Not on file     Emotionally abused: Not on file     Physically abused: Not on file     Forced sexual activity: Not on file   Other Topics Concern    Not on file   Social History Narrative    Not on file       Allergies: No Known Allergies    Current Medications:  Current Facility-Administered Medications   Medication Dose Route Frequency    lidocaine (XYLOCAINE) 20 mg/mL (2 %) injection 400 mg  20 mL SubCUTAneous ONCE    lidocaine-EPINEPHrine (XYLOCAINE) 1 %-1:100,000 injection 15 mg  1.5 mL IntraDERMal ONCE    midazolam (VERSED) injection 5 mg  5 mg IntraVENous Multiple    fentaNYL citrate (PF) injection 200 mcg  200 mcg IntraVENous Multiple    0.9% sodium chloride infusion 500 mL  500 mL IntraVENous CONTINUOUS    saline peripheral flush soln 10 mL  10 mL InterCATHeter PRN        Physical Exam:  Blood pressure 120/55, pulse 93, temperature 98.3 °F (36.8 °C), resp. rate 16, SpO2 100 %. LUNG: clear to auscultation bilaterally, HEART: regular rate and rhythm      Alerts:    Hospital Problems  Date Reviewed: 2/5/2020    None          Laboratory:    No results for input(s): HGB, HCT, WBC, PLT, INR, BUN, CREA, K, CRCLT, HGBEXT, HCTEXT, PLTEXT, INREXT in the last 72 hours. No lab exists for component: PTT, PT      Plan of Care/Planned Procedure:  Risks, benefits, and alternatives reviewed with patient and he agrees to proceed with the procedure.        Deon Rosario MD

## 2020-04-01 NOTE — DISCHARGE INSTRUCTIONS
[address] [facility]  Department of Interventional Radiology  [facility]  [phone]    DRAIN/PORT/CATHETER REMOVAL  DISCHARGE INSTRUCTIONS    General Information:     Your doctor has ordered for us to remove your feeding tube and port. . This could be that you do not need it anymore, it is not doing its job, your physician has decided on another plan for your treatment and/or it may need replacing. Home Care Instructions: You can resume your regular diet and medication regimen. Do not drink alcohol, drive, or make any important legal decisions in the next 24 hours. Do not lift anything heavier than a gallon of milk, or do anything strenuous for the next 24 hours. You will notice a dressing over the site of the removal. This dressing should stay in place until the site is healed. The dressing should be changed at least every 48 hours. You should change the dressing sooner if it becomes soiled or wet. The nurse who discharges you to home should review with you any wound care instructions. Resume your normal level of activity slowly. You may shower after 24 hours, but do not take a bath, swim or immerse yourself in water until the site has healed, and keep the dressing dry with plastic wrap while showering. The site may ooze for a couple of days. This drainage should lessen with each passing day. Call If:     You should call your Physician and/or the Radiology Nurse if you have any bleeding other than a small spot on your bandage. Call if you have any signs of infection, fever, or increased pain at the site of the tube. Call if the oozing increases, if it changes color, or begins to have an odor. Follow-Up Instructions: Please see your ordering doctor as he/she has requested. To Reach Us:   Side effects of sedation medications and other medications used today have been reviewed.   Notify us of nausea, itching, hives, dizziness, or anything else out of the ordinary. Should you experience any of these significant changes, please call [phone] between the hours of 7:30 am and 10 pm or [phone] after hours.  After hours, ask the  to page the [address] Technologist, and describe the problem to the technologist.                 Patient Signature:  Date: 4/1/2020  Discharging Nurse: Omi Carlton RN

## 2020-04-07 ENCOUNTER — TELEPHONE (OUTPATIENT)
Dept: ONCOLOGY | Age: 65
End: 2020-04-07

## 2020-04-07 ENCOUNTER — DOCUMENTATION ONLY (OUTPATIENT)
Dept: ONCOLOGY | Age: 65
End: 2020-04-07

## 2020-04-07 NOTE — PROGRESS NOTES
This note will not be viewable in [address] 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 Advance Directives: Patient does not have an advanced medical directive, and did not express interest in completing one today. Narrative:   Please note initial assessment completed on 9/25/19. Received a referral from colleague Patrick Hager NP. Called the patient, and reached him on his home phone P#[phone]. Patient explained that he had his PEG tube removed last week, and planned to go back to work his week. However, due to COVID-19, he is extending his short term disability through his employer, which will now be considered long term disability through his employer. He will lose his insurance through this process. Suggested he apply for Medicaid, but then learned that he is not a citizen, and therefore ineligible. COBRA would cost the patient $1095/month, which would not be affordable for him. Emailed a copy of the [facility], with his permission   [email or web address]. Offered active listening and emotional support as the patient shared that his wife lost her job as a  at [employer] due to [phone]; she had worked there for 27 years. The patient will receive $740/two weeks from his long term disability policy. He is attempting to eat soft foods, along with nutritional supplements. He has some Glucerna that someone gave him, and plans to purchase more supplements through [facility]. Referral placed to colleague Lenore Collins, Registered Dietitian for follow up purposes. Barriers to Care:   1. Losing insurance  2. Limited income while on long term disability     Assessment/Action:  Ongoing psychosocial support as desired by patient.       Plan/Referral:   Insurance/Entitlements referral  Financial/Medication assistance referral CORWIN HaileW

## 2020-04-07 NOTE — TELEPHONE ENCOUNTER
Received call from pt insurance company . She states she spoke with patient has week and he had a couple concerns. He said he is still having some dysphagia. Per insurance rep, PEG tube had been removed but I do not see that this was done. I advised I would have RD follow up with patient as he never had swallow study done, and unsure if this could be done at this time due to current COVID-19 pandemic. She also stated pt was concerned about losing health insurance as he is still not able to work. She stated he was requesting to speak with SW. I will notify our SW in office. She had no additional questions/concerns at this time.

## 2020-04-07 NOTE — TELEPHONE ENCOUNTER
Cancer [place] at [place] [address] [address]: [phone]  F: [phone]    Medical Nutrition Therapy    Nutrition Encounter:    Called and left a message on home and cell phone.       Signed By: Filomena Keene, [address], [address] [place] , [address] [facility] [address]

## 2020-04-08 ENCOUNTER — TELEPHONE (OUTPATIENT)
Dept: ONCOLOGY | Age: 65
End: 2020-04-08

## 2020-04-08 NOTE — TELEPHONE ENCOUNTER
Cancer [place] at [address], [address]: [phone]  F: [phone]    Medical Nutrition Therapy    Nutrition Encounter:    Spoke with patient. He reports the feeding tube has been removed. He struggles with swallowing certain foods, but is able to get down things like pasta. He is drinking 5-6 [employer] brand supplements. He has whey protein at home which he mixes with almond milk and fruit. Encouraged him to use whole milk and dry milk powder in addition to whey protein powder. The use of whole milk and dry milk powder will provide additional calories and protein instead of use almond milk. [place] milk provides about 1-2 grams of protein per 1 cup, Cow's milk or soy milk provides 8 grams of protein per 1 cup. We discussed other low cost strategies for getting enough calories. He is concerned about the price of supplements and wonders if the [employer] brand is fine. Explained any generic brand is fine. Will email him high calorie/protein recipes as he is not active on [employer]. It will be difficult to find him free products given the pandemic and products are usually not available unless he is using a feeding tube. He was scheduled to meet with SLP prior to treatment back in November but there are no notes in the computer from visit. He would benefit from follow up to evaluate swallow and provided appropriate exercises to strength his swallow. Call placed to [address] [place] [address]- awaiting a call back. Will ask for records and determine if they are seeing patients currently. Plan:  - Referral to [facility] arms for post-treatment swallow evaluation   - Continue with 5-6 nutrition supplements per day  - Continue to try soft & solid food.    - Will email him high calorie/protein recipes     Signed By: Jimmie Juarez, [address] [facility] [address], [address] [place] , [address] [facility] [address]

## 2020-04-15 ENCOUNTER — TELEPHONE (OUTPATIENT)
Dept: ONCOLOGY | Age: 65
End: 2020-04-15

## 2020-04-15 DIAGNOSIS — B37.0 ORAL THRUSH: Primary | ICD-10-CM

## 2020-04-15 RX ORDER — FLUCONAZOLE 200 MG/1
200 TABLET ORAL DAILY
Qty: 7 TAB | Refills: 0 | Status: SHIPPED | OUTPATIENT
Start: 2020-04-15 | End: 2020-04-22

## 2020-04-15 NOTE — TELEPHONE ENCOUNTER
Received call from patient. He states he has white patches in his mouth and some dysphagia, he believes it is oral thrush. Explained I would send fluconazole to his pharmacy on file, he may crush this as he has difficulty swallowing pills at times. Denies fevers/chills. His eating has improved. He was wondering about his swallow study. I told him I would reach out to RD however things are likely delayed due to COVID-19. He understands and has no additional questions/concerns. He has an upcoming ENT apt.

## 2020-04-16 ENCOUNTER — TELEPHONE (OUTPATIENT)
Dept: ONCOLOGY | Age: 65
End: 2020-04-16

## 2020-04-16 NOTE — TELEPHONE ENCOUNTER
Cancer [place] at [address]   [address] [place] [address], [address] [place] [address] [address]: [phone]  F: [phone]    Medical Nutrition Therapy    Nutrition Encounter:    Called and left a message with patient. [facility].       Signed By: Leonie Alejandra, [address] [facility] [address], [address] [place] , [address] [facility] [address]

## 2020-04-17 ENCOUNTER — DOCUMENTATION ONLY (OUTPATIENT)
Dept: ONCOLOGY | Age: 65
End: 2020-04-17

## 2020-04-17 NOTE — PROGRESS NOTES
This note will not be viewable in [address] 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      Advance Directives: Patient does not have an advanced medical directive, and did not express interest in completing one today. Narrative:   Patient dropped off his Care Card application and supportive documentation. Submitted this to the hospital financial counselors for scanning. Barriers to Care:   1. Losing insurance  2. Limited income while on long term disability     Assessment/Action:  Ongoing psychosocial support as desired by patient.       Plan/Referral:   Insurance/Entitlements referral  Financial/Medication assistance referral   Daniel Thomas, CORWINW

## 2020-04-20 ENCOUNTER — TELEPHONE (OUTPATIENT)
Dept: ONCOLOGY | Age: 65
End: 2020-04-20

## 2020-04-21 ENCOUNTER — HOSPITAL ENCOUNTER (OUTPATIENT)
Age: 65
Setting detail: OBSERVATION
Discharge: HOME | End: 2020-04-22
Attending: EMERGENCY MEDICINE | Admitting: FAMILY MEDICINE
Payer: COMMERCIAL

## 2020-04-21 ENCOUNTER — APPOINTMENT (OUTPATIENT)
Dept: GENERAL RADIOLOGY | Age: 65
End: 2020-04-21
Attending: EMERGENCY MEDICINE
Payer: COMMERCIAL

## 2020-04-21 DIAGNOSIS — Z79.01 ANTICOAGULATED: ICD-10-CM

## 2020-04-21 DIAGNOSIS — D64.9 SYMPTOMATIC ANEMIA: Primary | ICD-10-CM

## 2020-04-21 DIAGNOSIS — K92.1 BLOOD IN STOOL: ICD-10-CM

## 2020-04-21 PROBLEM — K92.2 GI BLEED: Status: ACTIVE | Noted: 2020-04-21

## 2020-04-21 LAB
ANION GAP SERPL CALC-SCNC: 5 MMOL/L (ref 5–15)
BASOPHILS # BLD: 0 K/UL (ref 0–0.1)
BASOPHILS NFR BLD: 0 % (ref 0–1)
BUN SERPL-MCNC: 18 MG/DL (ref 6–20)
BUN/CREAT SERPL: 19 (ref 12–20)
CALCIUM SERPL-MCNC: 9.5 MG/DL (ref 8.5–10.1)
CHLORIDE SERPL-SCNC: 104 MMOL/L (ref 97–108)
CO2 SERPL-SCNC: 29 MMOL/L (ref 21–32)
COMMENT, HOLDF: NORMAL
CREAT SERPL-MCNC: 0.93 MG/DL (ref 0.7–1.3)
D DIMER PPP FEU-MCNC: 0.29 MG/L FEU (ref 0–0.65)
DIFFERENTIAL METHOD BLD: ABNORMAL
EOSINOPHIL # BLD: 0.4 K/UL (ref 0–0.4)
EOSINOPHIL NFR BLD: 10 % (ref 0–7)
ERYTHROCYTE [DISTWIDTH] IN BLOOD BY AUTOMATED COUNT: 18.6 % (ref 11.5–14.5)
FERRITIN SERPL-MCNC: 4 NG/ML (ref 26–388)
GLUCOSE SERPL-MCNC: 103 MG/DL (ref 65–100)
HCT VFR BLD AUTO: 22.6 % (ref 36.6–50.3)
HGB BLD-MCNC: 6.7 G/DL (ref 12.1–17)
IMM GRANULOCYTES # BLD AUTO: 0 K/UL (ref 0–0.04)
IMM GRANULOCYTES NFR BLD AUTO: 0 % (ref 0–0.5)
LYMPHOCYTES # BLD: 1 K/UL (ref 0.8–3.5)
LYMPHOCYTES NFR BLD: 25 % (ref 12–49)
MCH RBC QN AUTO: 18.4 PG (ref 26–34)
MCHC RBC AUTO-ENTMCNC: 29.6 G/DL (ref 30–36.5)
MCV RBC AUTO: 62.1 FL (ref 80–99)
MONOCYTES # BLD: 0.6 K/UL (ref 0–1)
MONOCYTES NFR BLD: 15 % (ref 5–13)
NEUTS SEG # BLD: 2.1 K/UL (ref 1.8–8)
NEUTS SEG NFR BLD: 50 % (ref 32–75)
NRBC # BLD: 0 K/UL (ref 0–0.01)
NRBC BLD-RTO: 0 PER 100 WBC
PLATELET # BLD AUTO: 371 K/UL (ref 150–400)
PMV BLD AUTO: 9.9 FL (ref 8.9–12.9)
POTASSIUM SERPL-SCNC: 4.4 MMOL/L (ref 3.5–5.1)
RBC # BLD AUTO: 3.64 M/UL (ref 4.1–5.7)
RBC MORPH BLD: ABNORMAL
SAMPLES BEING HELD,HOLD: NORMAL
SODIUM SERPL-SCNC: 138 MMOL/L (ref 136–145)
TROPONIN I SERPL-MCNC: <0.05 NG/ML
WBC # BLD AUTO: 4.1 K/UL (ref 4.1–11.1)
WBC MORPH BLD: ABNORMAL

## 2020-04-21 PROCEDURE — 84484 ASSAY OF TROPONIN QUANT: CPT

## 2020-04-21 PROCEDURE — 71045 X-RAY EXAM CHEST 1 VIEW: CPT

## 2020-04-21 PROCEDURE — 85379 FIBRIN DEGRADATION QUANT: CPT

## 2020-04-21 PROCEDURE — 36415 COLL VENOUS BLD VENIPUNCTURE: CPT

## 2020-04-21 PROCEDURE — 99218 HC RM OBSERVATION: CPT

## 2020-04-21 PROCEDURE — 82728 ASSAY OF FERRITIN: CPT

## 2020-04-21 PROCEDURE — 85025 COMPLETE CBC W/AUTO DIFF WBC: CPT

## 2020-04-21 PROCEDURE — 80048 BASIC METABOLIC PNL TOTAL CA: CPT

## 2020-04-21 PROCEDURE — 86923 COMPATIBILITY TEST ELECTRIC: CPT

## 2020-04-21 PROCEDURE — 93005 ELECTROCARDIOGRAM TRACING: CPT

## 2020-04-21 PROCEDURE — 86900 BLOOD TYPING SEROLOGIC ABO: CPT

## 2020-04-21 PROCEDURE — 87635 SARS-COV-2 COVID-19 AMP PRB: CPT

## 2020-04-21 PROCEDURE — 99284 EMERGENCY DEPT VISIT MOD MDM: CPT

## 2020-04-21 RX ORDER — SODIUM CHLORIDE 0.9 % (FLUSH) 0.9 %
5-10 SYRINGE (ML) INJECTION AS NEEDED
Status: CANCELLED | OUTPATIENT
Start: 2020-10-08

## 2020-04-21 RX ORDER — SODIUM CHLORIDE 0.9 % (FLUSH) 0.9 %
5-10 SYRINGE (ML) INJECTION AS NEEDED
Status: CANCELLED | OUTPATIENT
Start: 2020-06-18

## 2020-04-21 RX ORDER — SODIUM CHLORIDE 9 MG/ML
10 INJECTION INTRAMUSCULAR; INTRAVENOUS; SUBCUTANEOUS AS NEEDED
Status: CANCELLED | OUTPATIENT
Start: 2020-06-18

## 2020-04-21 RX ORDER — HEPARIN 100 UNIT/ML
500 SYRINGE INTRAVENOUS AS NEEDED
Status: CANCELLED | OUTPATIENT
Start: 2020-09-10

## 2020-04-21 RX ORDER — SODIUM CHLORIDE 9 MG/ML
10 INJECTION INTRAMUSCULAR; INTRAVENOUS; SUBCUTANEOUS AS NEEDED
Status: CANCELLED | OUTPATIENT
Start: 2020-08-13

## 2020-04-21 RX ORDER — HEPARIN 100 UNIT/ML
500 SYRINGE INTRAVENOUS AS NEEDED
Status: CANCELLED | OUTPATIENT
Start: 2020-07-16

## 2020-04-21 RX ORDER — ACETAMINOPHEN 650 MG/1
650 SUPPOSITORY RECTAL
Status: DISCONTINUED | OUTPATIENT
Start: 2020-04-21 | End: 2020-04-22 | Stop reason: HOSPADM

## 2020-04-21 RX ORDER — ACETAMINOPHEN 325 MG/1
650 TABLET ORAL
Status: DISCONTINUED | OUTPATIENT
Start: 2020-04-21 | End: 2020-04-22 | Stop reason: HOSPADM

## 2020-04-21 RX ORDER — HEPARIN 100 UNIT/ML
500 SYRINGE INTRAVENOUS AS NEEDED
Status: CANCELLED | OUTPATIENT
Start: 2020-08-13

## 2020-04-21 RX ORDER — SODIUM CHLORIDE 9 MG/ML
10 INJECTION INTRAMUSCULAR; INTRAVENOUS; SUBCUTANEOUS AS NEEDED
Status: CANCELLED | OUTPATIENT
Start: 2020-09-10

## 2020-04-21 RX ORDER — SODIUM CHLORIDE 0.9 % (FLUSH) 0.9 %
5-10 SYRINGE (ML) INJECTION AS NEEDED
Status: CANCELLED | OUTPATIENT
Start: 2020-07-16

## 2020-04-21 RX ORDER — SODIUM CHLORIDE 0.9 % (FLUSH) 0.9 %
5-10 SYRINGE (ML) INJECTION AS NEEDED
Status: CANCELLED | OUTPATIENT
Start: 2020-05-21

## 2020-04-21 RX ORDER — SODIUM CHLORIDE 9 MG/ML
250 INJECTION, SOLUTION INTRAVENOUS AS NEEDED
Status: DISCONTINUED | OUTPATIENT
Start: 2020-04-21 | End: 2020-04-22 | Stop reason: HOSPADM

## 2020-04-21 RX ORDER — SODIUM CHLORIDE 0.9 % (FLUSH) 0.9 %
5-10 SYRINGE (ML) INJECTION AS NEEDED
Status: CANCELLED | OUTPATIENT
Start: 2020-08-13

## 2020-04-21 RX ORDER — SODIUM CHLORIDE 9 MG/ML
10 INJECTION INTRAMUSCULAR; INTRAVENOUS; SUBCUTANEOUS AS NEEDED
Status: CANCELLED | OUTPATIENT
Start: 2020-05-21

## 2020-04-21 RX ORDER — HEPARIN 100 UNIT/ML
500 SYRINGE INTRAVENOUS AS NEEDED
Status: CANCELLED | OUTPATIENT
Start: 2020-06-18

## 2020-04-21 RX ORDER — SODIUM CHLORIDE 9 MG/ML
10 INJECTION INTRAMUSCULAR; INTRAVENOUS; SUBCUTANEOUS AS NEEDED
Status: CANCELLED | OUTPATIENT
Start: 2020-07-16

## 2020-04-21 RX ORDER — SODIUM CHLORIDE 9 MG/ML
10 INJECTION INTRAMUSCULAR; INTRAVENOUS; SUBCUTANEOUS AS NEEDED
Status: CANCELLED | OUTPATIENT
Start: 2020-10-08

## 2020-04-21 RX ORDER — HEPARIN 100 UNIT/ML
500 SYRINGE INTRAVENOUS AS NEEDED
Status: CANCELLED | OUTPATIENT
Start: 2020-05-21

## 2020-04-21 RX ORDER — SODIUM CHLORIDE 0.9 % (FLUSH) 0.9 %
5-10 SYRINGE (ML) INJECTION AS NEEDED
Status: CANCELLED | OUTPATIENT
Start: 2020-09-10

## 2020-04-21 RX ORDER — HEPARIN 100 UNIT/ML
500 SYRINGE INTRAVENOUS AS NEEDED
Status: CANCELLED | OUTPATIENT
Start: 2020-10-08

## 2020-04-21 NOTE — TELEPHONE ENCOUNTER
Called patient and confirmed x2 identifiers   Patient stated he did receive call from MD Chad Schulte regarding low Hct and has stopped Xarelto  Patient denies bleeding but stated he feels \"weak\"    Informed patient per MD Jim that plan will be to have labs re-drawn in 2-3 days in [facility] with type and cross and possible blood transfusion   Iron studies would also be done to see if IV iron would be needed later as well     Patient verbalized understanding and agreed with plan   No new questions or concerns at this time

## 2020-04-21 NOTE — ED NOTES
Bedside and Verbal shift change report given to [facility] (oncoming nurse) by Maricel Liriano (offgoing nurse). Report included the following information SBAR, ED Summary, MAR and Recent Results.

## 2020-04-21 NOTE — ED TRIAGE NOTES
TRIAGE NOTE:  Patient arrives ambulatory with mask with c/o fatigue and patient states \"it feels like no blood is going to the side of my head\". Patient reports Dr. Christiano Ireland called because Hct was 22, and informed patient not to take xarelto. Patient being seen by Dr. Cristina Crowell from oncology for throat cancer which patient reports completed round of chemo and radiation for.

## 2020-04-21 NOTE — ED PROVIDER NOTES
61-year-old male with history of COPD and tonsillar cancer, status post removal of his PEG tube and port after successful completion of chemoradiation is here with shortness of breath, worse when going upstairs for the last several days. His doctor noted that his hematocrit was 22 and the plan was to have him go to the transfusion center within 2 days for additional blood work and possibly for blood transfusion. He was told to stop his Xarelto and he did. He had seen a small amount of blood in his stool several days ago, but he was very constipated. This has stopped now that he is no longer constipated. He has not bleeding from anywhere currently. No chest pain, no cough, no fever. ENT is Dr. Frantz Ching is Dr. Cami Coyne. Past Medical History:   Diagnosis Date    Cancer [facility])     Throat       Past Surgical History:   Procedure Laterality Date    IR INSERT GASTROSTOMY TUBE PERC  10/10/2019    IR INSERT TUNL CVC W PORT OVER 5 YEARS  10/10/2019    IR REMOVE TUNL CVAD W PORT/PUMP  4/1/2020    IR REPLACE GASTRO/CECOSTOMY TUBE PERC  1/8/2020         History reviewed. No pertinent family history.     Social History     Socioeconomic History    Marital status:      Spouse name: Not on file    Number of children: Not on file    Years of education: Not on file    Highest education level: Not on file   Occupational History    Not on file   Social Needs    Financial resource strain: Not on file    Food insecurity     Worry: Not on file     Inability: Not on file    Transportation needs     Medical: Not on file     Non-medical: Not on file   Tobacco Use    Smoking status: Former Smoker    Smokeless tobacco: Never Used   Substance and Sexual Activity    Alcohol use: Not on file     Comment: Socially    Drug use: Never    Sexual activity: Not on file   Lifestyle    Physical activity     Days per week: Not on file     Minutes per session: Not on file    Stress: Not on file   Relationships    Social connections     Talks on phone: Not on file     Gets together: Not on file     Attends Voodoo service: Not on file     Active member of club or organization: Not on file     Attends meetings of clubs or organizations: Not on file     Relationship status: Not on file    Intimate partner violence     Fear of current or ex partner: Not on file     Emotionally abused: Not on file     Physically abused: Not on file     Forced sexual activity: Not on file   Other Topics Concern    Not on file   Social History Narrative    Not on file         ALLERGIES: Patient has no known allergies. Review of Systems   Constitutional: Negative for fever. HENT: Negative for rhinorrhea. Eyes: Negative for visual disturbance. Respiratory: Positive for shortness of breath. Negative for cough. Cardiovascular: Negative for chest pain. Gastrointestinal: Negative for abdominal pain. Genitourinary: Negative for difficulty urinating. Musculoskeletal: Negative for gait problem. Skin: Negative for rash. Neurological: Negative for headaches. Hematological: Bruises/bleeds easily. Psychiatric/Behavioral: Negative for sleep disturbance. Vitals:    04/21/20 1837   BP: 131/77   Pulse: (!) 106   Resp: 18   Temp: 98 °F (36.7 °C)   SpO2: 100%            Physical Exam  Vitals signs and nursing note reviewed. Constitutional:       Appearance: He is well-developed. HENT:      Head: Normocephalic and atraumatic. Eyes:      Conjunctiva/sclera: Conjunctivae normal.   Neck:      Musculoskeletal: Normal range of motion. Cardiovascular:      Rate and Rhythm: Tachycardia present. Pulmonary:      Effort: Pulmonary effort is normal. No respiratory distress. Abdominal:      Palpations: Abdomen is soft. Musculoskeletal: Normal range of motion. Skin:     General: Skin is warm and dry. Coloration: Skin is pale. Neurological:      Mental Status: He is alert and oriented to person, place, and time. Psychiatric:         Behavior: Behavior normal.          MDM  Number of Diagnoses or Management Options  Anticoagulated:   Blood in stool:   Symptomatic anemia:   Diagnosis management comments: Hospitalist Lance Serve for Admission  8:09 PM    ED Room Number: ER08/08  Patient Name and age:  Margot Shoemaker 63 y.o.  male  Working Diagnosis: Symptomatic anemia  (primary encounter diagnosis)  Blood in stool  Anticoagulated    COVID-19 Suspicion:  no    Readmission: no  Isolation Requirements:  no  Recommended Level of Care:  med/surg  Department:[facility] Adult ED - 21   Other:  Recent tonsil CA chemo/rad, now finished. On xarelto, told by Jim to stop.  hgb 6.7. Had some trace blood in stool days ago w/ constipation, now gone.              Procedures

## 2020-04-21 NOTE — TELEPHONE ENCOUNTER
Dr. Aristides Gomez stating his Hct was low at 22    He should not take Xarelto  Please call him and see if he has a bleed.

## 2020-04-22 VITALS
BODY MASS INDEX: 21.36 KG/M2 | SYSTOLIC BLOOD PRESSURE: 125 MMHG | RESPIRATION RATE: 16 BRPM | WEIGHT: 144.62 LBS | DIASTOLIC BLOOD PRESSURE: 70 MMHG | OXYGEN SATURATION: 100 % | TEMPERATURE: 98.1 F | HEART RATE: 88 BPM

## 2020-04-22 LAB
ANION GAP SERPL CALC-SCNC: 5 MMOL/L (ref 5–15)
ATRIAL RATE: 90 BPM
BUN SERPL-MCNC: 14 MG/DL (ref 6–20)
BUN/CREAT SERPL: 16 (ref 12–20)
CALCIUM SERPL-MCNC: 9.6 MG/DL (ref 8.5–10.1)
CALCULATED P AXIS, ECG09: 65 DEGREES
CALCULATED R AXIS, ECG10: 54 DEGREES
CALCULATED T AXIS, ECG11: 59 DEGREES
CHLORIDE SERPL-SCNC: 108 MMOL/L (ref 97–108)
CO2 SERPL-SCNC: 25 MMOL/L (ref 21–32)
CREAT SERPL-MCNC: 0.87 MG/DL (ref 0.7–1.3)
DIAGNOSIS, 93000: NORMAL
FERRITIN SERPL-MCNC: 5 NG/ML (ref 26–388)
GLUCOSE SERPL-MCNC: 92 MG/DL (ref 65–100)
HCT VFR BLD AUTO: 24.1 % (ref 36.6–50.3)
HGB BLD-MCNC: 7.5 G/DL (ref 12.1–17)
IRON SATN MFR SERPL: 22 % (ref 20–50)
IRON SERPL-MCNC: 93 UG/DL (ref 35–150)
P-R INTERVAL, ECG05: 152 MS
POTASSIUM SERPL-SCNC: 4.1 MMOL/L (ref 3.5–5.1)
Q-T INTERVAL, ECG07: 320 MS
QRS DURATION, ECG06: 74 MS
QTC CALCULATION (BEZET), ECG08: 391 MS
SARS-COV-2, COV2: NOT DETECTED
SODIUM SERPL-SCNC: 138 MMOL/L (ref 136–145)
SPECIMEN SOURCE, FCOV2M: NORMAL
TIBC SERPL-MCNC: 420 UG/DL (ref 250–450)
TROPONIN I SERPL-MCNC: <0.05 NG/ML
VENTRICULAR RATE, ECG03: 90 BPM

## 2020-04-22 PROCEDURE — 84484 ASSAY OF TROPONIN QUANT: CPT

## 2020-04-22 PROCEDURE — 74011250637 HC RX REV CODE- 250/637: Performed by: FAMILY MEDICINE

## 2020-04-22 PROCEDURE — 83540 ASSAY OF IRON: CPT

## 2020-04-22 PROCEDURE — P9016 RBC LEUKOCYTES REDUCED: HCPCS

## 2020-04-22 PROCEDURE — 74011250636 HC RX REV CODE- 250/636: Performed by: FAMILY MEDICINE

## 2020-04-22 PROCEDURE — 36415 COLL VENOUS BLD VENIPUNCTURE: CPT

## 2020-04-22 PROCEDURE — 85018 HEMOGLOBIN: CPT

## 2020-04-22 PROCEDURE — 77030040361 HC SLV COMPR DVT MDII -B

## 2020-04-22 PROCEDURE — C9113 INJ PANTOPRAZOLE SODIUM, VIA: HCPCS | Performed by: FAMILY MEDICINE

## 2020-04-22 PROCEDURE — 82728 ASSAY OF FERRITIN: CPT

## 2020-04-22 PROCEDURE — 99218 HC RM OBSERVATION: CPT

## 2020-04-22 PROCEDURE — 96374 THER/PROPH/DIAG INJ IV PUSH: CPT

## 2020-04-22 PROCEDURE — 74011000250 HC RX REV CODE- 250: Performed by: FAMILY MEDICINE

## 2020-04-22 PROCEDURE — 80048 BASIC METABOLIC PNL TOTAL CA: CPT

## 2020-04-22 PROCEDURE — 36430 TRANSFUSION BLD/BLD COMPNT: CPT

## 2020-04-22 RX ORDER — SODIUM CHLORIDE 9 MG/ML
75 INJECTION, SOLUTION INTRAVENOUS CONTINUOUS
Status: DISCONTINUED | OUTPATIENT
Start: 2020-04-22 | End: 2020-04-22 | Stop reason: HOSPADM

## 2020-04-22 RX ORDER — BACLOFEN 10 MG/1
10 TABLET ORAL
Status: DISCONTINUED | OUTPATIENT
Start: 2020-04-22 | End: 2020-04-22 | Stop reason: HOSPADM

## 2020-04-22 RX ORDER — SODIUM CHLORIDE 0.9 % (FLUSH) 0.9 %
5-40 SYRINGE (ML) INJECTION EVERY 8 HOURS
Status: DISCONTINUED | OUTPATIENT
Start: 2020-04-22 | End: 2020-04-22 | Stop reason: HOSPADM

## 2020-04-22 RX ORDER — SODIUM CHLORIDE 0.9 % (FLUSH) 0.9 %
5-40 SYRINGE (ML) INJECTION AS NEEDED
Status: DISCONTINUED | OUTPATIENT
Start: 2020-04-22 | End: 2020-04-22 | Stop reason: HOSPADM

## 2020-04-22 RX ORDER — ATORVASTATIN CALCIUM 20 MG/1
20 TABLET, FILM COATED ORAL DAILY
Status: DISCONTINUED | OUTPATIENT
Start: 2020-04-22 | End: 2020-04-22 | Stop reason: HOSPADM

## 2020-04-22 RX ADMIN — SODIUM CHLORIDE 40 MG: 9 INJECTION INTRAMUSCULAR; INTRAVENOUS; SUBCUTANEOUS at 11:08

## 2020-04-22 RX ADMIN — SODIUM CHLORIDE 75 ML/HR: 900 INJECTION, SOLUTION INTRAVENOUS at 06:00

## 2020-04-22 NOTE — PROGRESS NOTES
At 2054, this RN received report from [facility] ([place] At 2115, orders were placed for the patient to be tested for COVID and placed on droplet plus precautions. The patient then arrived to [address] [place] [address] at 2120. Orders received for transfer of patient to [address] [place] [address]. This RN gave report to [employer].

## 2020-04-22 NOTE — PROGRESS NOTES
Problem: Falls - Risk of  Goal: *Absence of Falls  Description: Document Danney Mess Fall Risk and appropriate interventions in the flowsheet.   Outcome: Progressing Towards Goal  Note: Fall Risk Interventions:   Patient to call for assistance  Gripper socks on patient  Call bell within reach                             Problem: Anemia Care Plan (Adult and Pediatric)  Goal: *Labs within defined limits  Outcome: Progressing Towards Goal  Note: Draw labs (Hgb & Hct) as ordered   Monitor labs     Goal: *Tolerates increased activity  Outcome: Progressing Towards Goal  Note: Patient tolerating activity well

## 2020-04-22 NOTE — PROGRESS NOTES
I have reviewed discharge instructions with the patient. The patient verbalized understanding. Signs, symptoms and side effects discussed. Opportunity for questions and clarification provided.   Patient leaving via private vehicle with wife  Patient discharging to home

## 2020-04-22 NOTE — PROGRESS NOTES
Hospital follow-up PCP tele-health transitional care appointment has been scheduled with Dr. Thomas Saunders for Wednesday, 4/29/20 at 10:00 a.m. Pending patient discharge.   Venu Young, Care Management Specialist.

## 2020-04-22 NOTE — ED NOTES
TRANSFER - OUT REPORT:    Verbal report given to Reynold (name) on Isma Ellis  being transferred to 5E(unit) for routine progression of care       Report consisted of patients Situation, Background, Assessment and   Recommendations(SBAR). Information from the following report(s) SBAR, ED Summary, [facility] and Recent Results was reviewed with the receiving nurse. Lines:   Peripheral IV 04/21/20 Right Antecubital (Active)   Site Assessment Clean;Dry 4/21/2020  7:02 PM   Phlebitis Assessment 0 4/21/2020  7:02 PM   Dressing Status Clean, dry, & intact 4/21/2020  7:02 PM   Dressing Type Transparent 4/21/2020  7:02 PM   Hub Color/Line Status Pink 4/21/2020  7:02 PM        Opportunity for questions and clarification was provided.

## 2020-04-22 NOTE — ROUTINE PROCESS
Bedside shift change report given to [address] [place], [place] [address]juan Wong (oncoming nurse) by Jeaneth Sainz (offgoing nurse). Report included the following information SBAR.

## 2020-04-22 NOTE — PROGRESS NOTES
Per Dr. Anurag Bustamante cancel GI consult.    Patient is going to be discharged per Dr. Anurag Bustamante

## 2020-04-22 NOTE — H&P
[address] [facility]  HISTORY AND PHYSICAL    Name:  Rehana Delgado  MR#:  [id number]  :  1955  ACCOUNT #:  [de-identified]  ADMIT DATE:  2020      CHIEF COMPLAINT:  Shortness of breath. HISTORY OF PRESENT ILLNESS:  The patient is a 29-year-old gentleman with past medical history of COPD and tonsillar cancer, presents to the hospital with the above-mentioned symptoms. The patient has a history of COPD and complains of tonsillar cancer, underwent chemotherapy and radiation for the tonsillar cancer, had PEG tube, which was removed two weeks ago and has completed chemotherapy successfully. The patient reports that for the past week or so he has been having worsening shortness of breath, reports more dyspnea on exertion. Denies any fever, chills, cough, or sick contact with COVID-19 patients. The patient reports that his hemoglobin has been dropping and it was noted that his hematocrit was 22 and he was going to get transfusion about 2 days ago, he was told to stop his Xarelto. He saw a small amount of blood several days ago, but he was very constipated at that point. Since then, he has not had any black stool. The patient reports that he is no longer constipated at this point of time. The patient reports that he has no other complaints or problems. Denies any headaches, blurry vision, sore throat, trouble swallowing, trouble with speech, any chest pain, abdominal pain, constipation, diarrhea, urinary symptoms, focal or generalized neurological weakness, recent travel, sick contacts, falls, injuries, hematemesis, melena, hemoptysis, hematuria, or any other concerns or problems. PAST MEDICAL HISTORY:  See above. HOME MEDICATIONS:  Currently, the patient is on,  1. Lipitor 20 mg daily. 2.  Fluconazole 200 mg daily. 3.  Pantoprazole 40 mg daily. 4.  Xarelto 20 mg daily. 5.  Cyanocobalamin as needed. 6.  Medrol pack  7. Multivitamin one tablet daily.     SOCIAL HISTORY: Former smoker. No alcohol. No IV drug abuse. ALLERGIES:  NO KNOWN DRUG ALLERGIES. REVIEW OF SYMPTOMS:  All systems were reviewed and found to be essentially negative except for the symptoms mentioned above. FAMILY HISTORY:  Discussed. No history of cancers in the family. PHYSICAL EXAMINATION:  VITAL SIGNS:  Temperature 98, pulse 88, respiratory rate 24, blood pressure 128/78, pulse oximetry 96% on room air. GENERAL:  Alert x3, awake. No acute distress, resting in bed, pleasant male, appears to be stated age. HEENT:  Pupils are equal and reactive to light. Dry mucous membranes. Tympanic membranes are clear. NECK:  Supple. CHEST:  Decreased basilar breath sounds. COR:  S1 and S2 heard. ABDOMEN:  Soft, nontender, and nondistended. Bowel sounds are physiologic. EXTREMITIES:  No clubbing, no cyanosis, no edema. NEURO/PSYCH:  Pleasant mood and affect. Cranial nerves II through XII grossly intact. No focal neurological deficits. SKIN:  Warm. LABORATORY DATA:  White count 4.1, hemoglobin 6.7, hematocrit 22.6, platelets 877. Sodium 138, potassium 4.4, chloride 104, bicarbonate 29, anion gap , glucose 103, BUN 18, creatinine 0.93, calcium 9.5, troponin less than 0.05. X-ray of the chest shows no acute process. EKG shows normal sinus rhythm with nonspecific ST changes. ASSESSMENT AND PLAN:  1. Acute anemia, unclear etiology, possible gastrointestinal bleed versus malignancy versus other etiology. We will provide one unit packed red blood cells. We will put the patient on Protonix. We will get stool for occult blood. We will transfuse further if needed. Gastrointestinal consult. Hemoglobin and hematocrit every 8 hours and further intervention per hospital course. Continue to closely monitor. We will get ferritin and iron panel and monitor for now. 2.  History of tonsillar cancer. Continue home medications and continue to closely monitor.   3.  History of chronic obstructive pulmonary disease, does not look currently in exacerbation. Monitor for now. Continue to monitor. 4.  Dehydration. The patient appears to be mildly dehydrated. Provide gentle IV hydration and repeat labs in the morning. Continue to monitor. 5.  Gastrointestinal and deep vein thrombosis prophylaxis:  The patient will be on sequential compression devices.         Jewell Zepeda MD      [id number]  D:  04/21/2020 21:29  T:  04/21/2020 23:04  JOB #:  [id number]

## 2020-04-22 NOTE — PROGRESS NOTES
Hospitalist paged for hemoglobin of 7.5 after one unit of PRBC. Waiting new orders. 0550- Hold 2nd unit of PRBC for now. Continue to monitor.

## 2020-04-22 NOTE — DISCHARGE INSTRUCTIONS
Discharge Instructions       PATIENT ID: Anuradha Davis  MRN: [id number]   YOB: 1955    DATE OF ADMISSION: 4/21/2020  6:40 PM    DATE OF DISCHARGE: 4/22/2020    PRIMARY CARE PROVIDER: Odessa Hager MD     ATTENDING PHYSICIAN: Terry Palma MD  DISCHARGING PROVIDER: David Cox MD    To contact this individual call [phone] and ask the  to page. If unavailable ask to be transferred the Adult Hospitalist Department. DISCHARGE DIAGNOSES   Anemia    CONSULTATIONS: IP CONSULT TO HOSPITALIST    PROCEDURES/SURGERIES: * No surgery found *    PENDING TEST RESULTS:   At the time of discharge the following test results are still pending: none    FOLLOW UP APPOINTMENTS:   Follow-up Information     Follow up With Specialties Details Why Contact Info    Odessa Hager MD Family Practice In 1 week  [address] [facility] 7 [address] [facility] 21 [facility]      Martine Clark MD Hematology and Oncology, Internal Medicine, Hematology, Oncology In 1 week  [address]  [phone]             ADDITIONAL CARE RECOMMENDATIONS:   Follow up with PMD /Oncology in 1 week    DIET: Regular Diet    ACTIVITY: Activity as tolerated      DISCHARGE MEDICATIONS:   See Medication Reconciliation Form    · It is important that you take the medication exactly as they are prescribed. · Keep your medication in the bottles provided by the pharmacist and keep a list of the medication names, dosages, and times to be taken in your wallet. · Do not take other medications without consulting your doctor. NOTIFY YOUR PHYSICIAN FOR ANY OF THE FOLLOWING:   Fever over 101 degrees for 24 hours. Chest pain, shortness of breath, fever, chills, nausea, vomiting, diarrhea, change in mentation, falling, weakness, bleeding. Severe pain or pain not relieved by medications. Or, any other signs or symptoms that you may have questions about.       DISPOSITION:  x  Home With:   OT  PT [facility]  RN       SNF/Inpatient Rehab/LTAC    Independent/assisted living    Hospice    Other:     CDMP Checked:   Yes x     PROBLEM LIST Updated:  Yes x       Signed:   Monique Truong MD  4/22/2020  9:41 AM

## 2020-04-22 NOTE — PROGRESS NOTES
[address] [facility] Adult  Hospitalist Group                                                                                          Hospitalist Progress Note  Areli Hudson MD  Answering service: [phone] OR [phone] from in house phone        Date of Service:  2020  NAME:  Rich Cherry  :  1955  MRN:  [id number]      Admission Summary:   The patient is a 77-year-old gentleman with past medical history of COPD and tonsillar cancer, presents to the hospital with SOB. The patient has a history of COPD and complains of tonsillar cancer, underwent chemotherapy and radiation for the tonsillar cancer, had PEG tube, which was removed two weeks ago and has completed chemotherapy successfully. The patient reports that for the past week or so he has been having worsening shortness of breath, reports more dyspnea on exertion. Denies any fever, chills, cough, or sick contact with COVID-19 patients. The patient reports that his hemoglobin has been dropping and it was noted that his hematocrit was 22 and he was going to get transfusion about 2 days ago, he was told to stop his Xarelto. He saw a small amount of blood several days ago, but he was very constipated at that point. Since then, he has not had any black stool. The patient reports that he is no longer constipated at this point of time. The patient reports that he has no other complaints or problems.   Denies any headaches, blurry vision, sore throat, trouble swallowing, trouble with speech, any chest pain, abdominal pain, constipation, diarrhea, urinary symptoms, focal or generalized neurological weakness, recent travel, sick contacts, falls, injuries, hematemesis, melena, hemoptysis, hematuria, or any other concerns or problems.       Interval history / Subjective:     F/u SOB   Feeling fine  No SOB, chest pain, nausea or vomiting  Assessment & Plan:     Acute anemia  - unclear etiology, possible gastrointestinal bleed versus malignancy versus chemo/radiation  -Will discuss with kayley Sifuentes  -S/p 1 unit PRBC  -Monitor  -Transfuse for hb<7    SOB  -sec to above  -CXR 4/21 no acute process  -now improved  -Awaiting COVID testing, very low suspicion    History of tonsillar cancer/SCC  -s/p chemotherapy/radiation  -family history of cancers  -Outpatient follow up with Dr Jessy Ortiz and Dr Toni Boogie    History of chronic obstructive pulmonary disease, not in exacerbation  -stable    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  -PORT was taken out 2 weeks  -was asked to be off anticoagulation since then    History of dysphagia   -sec to above  -was on PEG tube, PEG taken off 2 weeks ago    Dehydration, mild  -on IV fluids    History of smoking    Regular diet    Code status: FULL CODE  DVT prophylaxis: scd  PTA: home  Baseline: Independent    Plan: Will consider discharge if tolerating diet    Care Plan discussed with: Patient/Family  Anticipated Disposition: Home w/Family  Anticipated Discharge: Less than 24 hours     Hospital Problems  Date Reviewed: 4/22/2020          Codes Class Noted POA    * (Principal) GI bleed ICD-10-CM: K92.2  ICD-9-CM: 578.9  4/21/2020 Yes                Review of Systems:   A comprehensive review of systems was negative except for that written in the HPI. Vital Signs:    Last 24hrs VS reviewed since prior progress note. Most recent are:  Visit Vitals  /69 (BP 1 Location: Left arm, BP Patient Position: At rest)   Pulse 87   Temp 98 °F (36.7 °C)   Resp 16   Wt 65.6 kg (144 lb 10 oz)   SpO2 98%   BMI 21.36 kg/m²       No intake or output data in the 24 hours ending 04/22/20 0750     Physical Examination:             Constitutional:  No acute distress, cooperative, pleasant    ENT:  Oral mucosa moist, oropharynx benign. Resp:  CTA bilaterally. No wheezing/rhonchi/rales. No accessory muscle use   CV:  Regular rhythm, normal rate, no murmurs, gallops, rubs    GI:  Soft, non distended, non tender.  normoactive bowel sounds, no hepatosplenomegaly     Musculoskeletal:  No edema, warm, 2+ pulses throughout    Neurologic:  Moves all extremities. AAOx3, CN II-XII reviewed     Skin:  Good turgor, no rashes or ulcers       Data Review:    Review and/or order of clinical lab test      Labs:     Recent Labs     04/22/20  0508 04/21/20 1855   WBC  --  4.1   HGB 7.5* 6.7*   HCT 24.1* 22.6*   PLT  --  371     Recent Labs     04/22/20  0600 04/21/20  1855    138   K 4.1 4.4    104   CO2 25 29   BUN 14 18   CREA 0.87 0.93   GLU 92 103*   CA 9.6 9.5     No results for input(s): SGOT, GPT, ALT, AP, TBIL, TBILI, TP, ALB, GLOB, GGT, AML, LPSE in the last 72 hours. No lab exists for component: AMYP, HLPSE  No results for input(s): INR, PTP, APTT, INREXT in the last 72 hours. Recent Labs     04/22/20  0610 04/21/20 1855   TIBC 420  --    PSAT 22  --    FERR 5* 4*      No results found for: FOL, RBCF   No results for input(s): PH, PCO2, PO2 in the last 72 hours.   Recent Labs     04/22/20  0559 04/21/20 1855   TROIQ <0.05 <0.05     No results found for: CHOL, CHOLX, CHLST, CHOLV, HDL, HDLP, LDL, LDLC, DLDLP, TGLX, TRIGL, TRIGP, CHHD, CHHDX  No results found for: GLUCPOC  No results found for: COLOR, APPRN, SPGRU, REFSG, ISELA, PROTU, GLUCU, KETU, BILU, UROU, LENORE, LEUKU, GLUKE, EPSU, BACTU, WBCU, RBCU, CASTS, UCRY      Medications Reviewed:     Current Facility-Administered Medications   Medication Dose Route Frequency    atorvastatin (LIPITOR) tablet 20 mg  20 mg Oral DAILY    baclofen (LIORESAL) tablet 10 mg  10 mg Oral TID PRN    sodium chloride (NS) flush 5-40 mL  5-40 mL IntraVENous Q8H    sodium chloride (NS) flush 5-40 mL  5-40 mL IntraVENous PRN    0.9% sodium chloride infusion  75 mL/hr IntraVENous CONTINUOUS    pantoprazole (PROTONIX) 40 mg in 0.9% sodium chloride 10 mL injection  40 mg IntraVENous Q12H    0.9% sodium chloride infusion 250 mL  250 mL IntraVENous PRN    acetaminophen (TYLENOL) tablet 650 mg  650 mg Oral Q6H PRN    Or    acetaminophen (TYLENOL) suppository 650 mg  650 mg Rectal Q6H PRN     ______________________________________________________________________  EXPECTED LENGTH OF STAY: - - -  ACTUAL LENGTH OF STAY:          Kamlesh Knight MD

## 2020-04-22 NOTE — DISCHARGE SUMMARY
Discharge Summary       PATIENT ID: Tiffanie Flor  MRN: [id number]   YOB: 1955    DATE OF ADMISSION: 4/21/2020  6:40 PM    DATE OF DISCHARGE: 4/22/2020   PRIMARY CARE PROVIDER: Adiel Wong MD     ATTENDING PHYSICIAN: Dr Tevin Clarke  DISCHARGING PROVIDER: Tevin Clarke MD    To contact this individual call [phone] and ask the  to page. If unavailable ask to be transferred the Adult Hospitalist Department. CONSULTATIONS: IP CONSULT TO HOSPITALIST    PROCEDURES/SURGERIES: * No surgery found *    ADMITTING DIAGNOSES & HOSPITAL COURSE:   Acute anemia  - unclear etiology, possible gastrointestinal bleed versus malignancy versus chemo/radiation  -Will discuss with kayley Villalobos  -S/p 1 unit PRBC  -Monitor  -Transfuse for hb<7     SOB  -sec to above  -CXR 4/21 no acute process  -now improved  -Awaiting COVID testing, very low suspicion     History of tonsillar cancer/SCC  -s/p chemotherapy/radiation  -family history of cancers  -Outpatient follow up with Dr Clara Breen and Dr Louisa Nova     History of chronic obstructive pulmonary disease, not in exacerbation  -stable     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   Anemia     ADDITIONAL CARE RECOMMENDATIONS:   Follow up with PMD/oncology     PENDING TEST RESULTS:   At the time of discharge the following test results are still pending: none    FOLLOW UP APPOINTMENTS:    Follow-up Information     Follow up With Specialties Details Why Contact Info    Adiel Wong MD Family Practice In 1 week  [address] [facility] [address]      Rizvi Free, MD Hematology and Oncology, Internal Medicine, Hematology, Oncology In 1 week  [address] [id number] [facility]  [phone]               DIET: Regular Diet    ACTIVITY: Activity as tolerated      DISCHARGE MEDICATIONS:  Current Discharge Medication List      CONTINUE these medications which have NOT CHANGED    Details   fluconazole (DIFLUCAN) 200 mg tablet Take 1 Tab by mouth daily for 7 days. FDA advises cautious prescribing of oral fluconazole in pregnancy. Qty: 7 Tab, Refills: 0    Associated Diagnoses: Oral thrush      baclofen (LIORESAL) 10 mg tablet Take 1 Tab by mouth three (3) times daily as needed for Other (Hiccups). Qty: 21 Tab, Refills: 0    Associated Diagnoses: Hiccups      pantoprazole (PROTONIX) 40 mg tablet Take 1 Tab by mouth daily. Qty: 30 Tab, Refills: 2    Associated Diagnoses: Tonsil cancer ([address]      saliva substitution (BIOTENE DRY MOUTH ORAL RINSE) mwsh mouthwash 15 mL by Mucous Membrane route as needed. cyanocobalamin 1,000 mcg tablet Take 1,000 mcg by mouth daily. scopolamine (TRANSDERM-SCOP) 1 mg over 3 days pt3d 1 Patch by TransDERmal route every seventy-two (72) hours. Qty: 6 Patch, Refills: 2    Associated Diagnoses: Tonsil cancer (HCC)      methylPREDNISolone (MEDROL) 8 mg tablet Take 5 Tabs by mouth See Admin Instructions. Take 5 tabs by mouth (40mg) on days 2&3 of chemotherapy  Qty: 30 Tab, Refills: 0    Associated Diagnoses: Tonsil cancer ([address]      nut tx, lact-reduced, iron (BOOST VHC) 0.09-2.25 gram-kcal/mL liqd 6 Cans by Per G Tube route daily. Qty: 180 Bottle, Refills: 3    Comments: Bolus feed- 1 can 6 times a day. Flush 120ml water before and after bolus feeds. aluminum-magnesium hydroxide 200-200 mg/5 mL susp 5 mL, diphenhydrAMINE 12.5 mg/5 mL liqd 12.5 mg, lidocaine 2 % soln 5 mL 5 mL by Swish and Spit route two (2) times a day.  Maalox  Lidocaine 2% viscous   Diphenhydramine oral solution     Pharmacy to mix equal portions of ingredients to a total volume as indicated in the dispense amount. Swish&spit 5mL (1 tsp) 4 times daily as needed  Qty: 480 mL, Refills: 3    Associated Diagnoses: Tonsil cancer ([address]      ondansetron (ZOFRAN ODT) 8 mg disintegrating tablet Take 1 Tab by mouth every eight (8) hours as needed for Nausea. Qty: 30 Tab, Refills: 2    Associated Diagnoses: Tonsil cancer ([facility]      hydroCHLOROthiazide (HYDRODIURIL) 12.5 mg tablet Take 12.5 mg by mouth daily. atorvastatin (LIPITOR) 20 mg tablet Take  by mouth daily. multivitamin (ONE A DAY) tablet Take 1 Tab by mouth daily. STOP taking these medications       rivaroxaban (XARELTO) 20 mg tab tablet Comments:   Reason for Stopping:                 NOTIFY YOUR PHYSICIAN FOR ANY OF THE FOLLOWING:   Fever over 101 degrees for 24 hours. Chest pain, shortness of breath, fever, chills, nausea, vomiting, diarrhea, change in mentation, falling, weakness, bleeding. Severe pain or pain not relieved by medications. Or, any other signs or symptoms that you may have questions about.     DISPOSITION:   x Home With:   OT  PT  HH  RN       Long term SNF/Inpatient Rehab    Independent/assisted living    Hospice    Other:       PATIENT CONDITION AT DISCHARGE:     Functional status    Poor     Deconditioned    x Independent      Cognition   x  Lucid     Forgetful     Dementia      Catheters/lines (plus indication)    Calle     PICC     PEG    x None      Code status   x  Full code     DNR      PHYSICAL EXAMINATION AT DISCHARGE:  Please see progress note      CHRONIC MEDICAL DIAGNOSES:  Problem List as of 4/22/2020 Date Reviewed: 4/22/2020          Codes Class Noted - Resolved    * (Principal) GI bleed ICD-10-CM: K92.2  ICD-9-CM: 578.9  4/21/2020 - Present        Tonsil cancer ([facility] ICD-10-CM: C09.9  ICD-9-CM: 146.0  9/25/2019 - Present        Smoking ICD-10-CM: F17.200  ICD-9-CM: 305.1  9/25/2019 - Present              Greater than 35 minutes were spent with the patient on counseling and coordination of care    Signed:   Tavo Shen MD  4/22/2020  9:41 AM

## 2020-04-23 ENCOUNTER — HOSPITAL ENCOUNTER (OUTPATIENT)
Dept: INFUSION THERAPY | Age: 65
End: 2020-04-23

## 2020-04-23 ENCOUNTER — PATIENT OUTREACH (OUTPATIENT)
Dept: CASE MANAGEMENT | Age: 65
End: 2020-04-23

## 2020-04-23 LAB
ABO + RH BLD: NORMAL
BLD PROD TYP BPU: NORMAL
BLOOD GROUP ANTIBODIES SERPL: NORMAL
BPU ID: NORMAL
CROSSMATCH RESULT,%XM: NORMAL
SPECIMEN EXP DATE BLD: NORMAL
STATUS OF UNIT,%ST: NORMAL
UNIT DIVISION, %UDIV: 0

## 2020-04-23 NOTE — TELEPHONE ENCOUNTER
Called patient and confirmed x2 identifiers   Provided swab test results to patient and the following plan:  Weekly lab draws that will include CBC and type and cross   [facility]   Patient agreed with plan and also requested possible fluids if needed     No new questions or concerns at this time

## 2020-04-30 ENCOUNTER — HOSPITAL ENCOUNTER (OUTPATIENT)
Dept: INFUSION THERAPY | Age: 65
Discharge: HOME | End: 2020-04-30
Payer: COMMERCIAL

## 2020-04-30 ENCOUNTER — DOCUMENTATION ONLY (OUTPATIENT)
Dept: ONCOLOGY | Age: 65
End: 2020-04-30

## 2020-04-30 ENCOUNTER — OFFICE VISIT (OUTPATIENT)
Dept: ONCOLOGY | Age: 65
End: 2020-04-30

## 2020-04-30 VITALS
OXYGEN SATURATION: 99 % | HEIGHT: 69 IN | HEART RATE: 98 BPM | SYSTOLIC BLOOD PRESSURE: 146 MMHG | WEIGHT: 135 LBS | DIASTOLIC BLOOD PRESSURE: 72 MMHG | TEMPERATURE: 97.6 F | BODY MASS INDEX: 19.99 KG/M2

## 2020-04-30 VITALS
DIASTOLIC BLOOD PRESSURE: 79 MMHG | RESPIRATION RATE: 18 BRPM | SYSTOLIC BLOOD PRESSURE: 120 MMHG | TEMPERATURE: 97.6 F | HEART RATE: 121 BPM

## 2020-04-30 DIAGNOSIS — C09.9 TONSIL CANCER (HCC): ICD-10-CM

## 2020-04-30 DIAGNOSIS — K92.2 GASTROINTESTINAL HEMORRHAGE, UNSPECIFIED GASTROINTESTINAL HEMORRHAGE TYPE: Primary | ICD-10-CM

## 2020-04-30 DIAGNOSIS — D50.9 IRON DEFICIENCY ANEMIA, UNSPECIFIED IRON DEFICIENCY ANEMIA TYPE: ICD-10-CM

## 2020-04-30 DIAGNOSIS — C09.9 TONSIL CANCER (HCC): Primary | ICD-10-CM

## 2020-04-30 LAB
ABO + RH BLD: NORMAL
BASOPHILS # BLD: 0 K/UL (ref 0–0.1)
BASOPHILS NFR BLD: 1 % (ref 0–1)
BLOOD GROUP ANTIBODIES SERPL: NORMAL
DIFFERENTIAL METHOD BLD: ABNORMAL
EOSINOPHIL # BLD: 0.5 K/UL (ref 0–0.4)
EOSINOPHIL NFR BLD: 14 % (ref 0–7)
ERYTHROCYTE [DISTWIDTH] IN BLOOD BY AUTOMATED COUNT: 22.5 % (ref 11.5–14.5)
HCT VFR BLD AUTO: 26.2 % (ref 36.6–50.3)
HGB BLD-MCNC: 7.9 G/DL (ref 12.1–17)
IMM GRANULOCYTES # BLD AUTO: 0 K/UL (ref 0–0.04)
IMM GRANULOCYTES NFR BLD AUTO: 0 % (ref 0–0.5)
LYMPHOCYTES # BLD: 0.9 K/UL (ref 0.8–3.5)
LYMPHOCYTES NFR BLD: 27 % (ref 12–49)
MCH RBC QN AUTO: 19.2 PG (ref 26–34)
MCHC RBC AUTO-ENTMCNC: 30.2 G/DL (ref 30–36.5)
MCV RBC AUTO: 63.6 FL (ref 80–99)
MONOCYTES # BLD: 0.4 K/UL (ref 0–1)
MONOCYTES NFR BLD: 12 % (ref 5–13)
NEUTS SEG # BLD: 1.5 K/UL (ref 1.8–8)
NEUTS SEG NFR BLD: 46 % (ref 32–75)
NRBC # BLD: 0 K/UL (ref 0–0.01)
NRBC BLD-RTO: 0 PER 100 WBC
PLATELET # BLD AUTO: 297 K/UL (ref 150–400)
PMV BLD AUTO: 9.6 FL (ref 8.9–12.9)
RBC # BLD AUTO: 4.12 M/UL (ref 4.1–5.7)
RBC MORPH BLD: ABNORMAL
SPECIMEN EXP DATE BLD: NORMAL
WBC # BLD AUTO: 3.3 K/UL (ref 4.1–11.1)

## 2020-04-30 PROCEDURE — 36415 COLL VENOUS BLD VENIPUNCTURE: CPT

## 2020-04-30 PROCEDURE — 85025 COMPLETE CBC W/AUTO DIFF WBC: CPT

## 2020-04-30 PROCEDURE — 86900 BLOOD TYPING SEROLOGIC ABO: CPT

## 2020-04-30 RX ORDER — SODIUM CHLORIDE 0.9 % (FLUSH) 0.9 %
10 SYRINGE (ML) INJECTION AS NEEDED
Status: CANCELLED
Start: 2020-05-12

## 2020-04-30 RX ORDER — ALBUTEROL SULFATE 0.83 MG/ML
2.5 SOLUTION RESPIRATORY (INHALATION) AS NEEDED
Status: CANCELLED
Start: 2020-05-12

## 2020-04-30 RX ORDER — EPINEPHRINE 1 MG/ML
0.3 INJECTION, SOLUTION, CONCENTRATE INTRAVENOUS AS NEEDED
Status: CANCELLED | OUTPATIENT
Start: 2020-05-05

## 2020-04-30 RX ORDER — ONDANSETRON 2 MG/ML
8 INJECTION INTRAMUSCULAR; INTRAVENOUS AS NEEDED
Status: CANCELLED | OUTPATIENT
Start: 2020-05-12

## 2020-04-30 RX ORDER — EPINEPHRINE 1 MG/ML
0.3 INJECTION, SOLUTION, CONCENTRATE INTRAVENOUS AS NEEDED
Status: CANCELLED | OUTPATIENT
Start: 2020-05-12

## 2020-04-30 RX ORDER — ACETAMINOPHEN 325 MG/1
650 TABLET ORAL AS NEEDED
Status: CANCELLED
Start: 2020-05-12

## 2020-04-30 RX ORDER — ALBUTEROL SULFATE 0.83 MG/ML
2.5 SOLUTION RESPIRATORY (INHALATION) AS NEEDED
Status: CANCELLED
Start: 2020-05-05

## 2020-04-30 RX ORDER — HYDROCORTISONE SODIUM SUCCINATE 100 MG/2ML
100 INJECTION, POWDER, FOR SOLUTION INTRAMUSCULAR; INTRAVENOUS AS NEEDED
Status: CANCELLED | OUTPATIENT
Start: 2020-05-12

## 2020-04-30 RX ORDER — HEPARIN 100 UNIT/ML
300-500 SYRINGE INTRAVENOUS AS NEEDED
Status: CANCELLED
Start: 2020-05-05

## 2020-04-30 RX ORDER — SODIUM CHLORIDE 9 MG/ML
10 INJECTION INTRAMUSCULAR; INTRAVENOUS; SUBCUTANEOUS AS NEEDED
Status: CANCELLED | OUTPATIENT
Start: 2020-05-12

## 2020-04-30 RX ORDER — SODIUM CHLORIDE 9 MG/ML
10 INJECTION INTRAMUSCULAR; INTRAVENOUS; SUBCUTANEOUS AS NEEDED
Status: CANCELLED | OUTPATIENT
Start: 2020-05-05

## 2020-04-30 RX ORDER — SODIUM CHLORIDE 0.9 % (FLUSH) 0.9 %
10 SYRINGE (ML) INJECTION AS NEEDED
Status: CANCELLED
Start: 2020-05-05

## 2020-04-30 RX ORDER — DIPHENHYDRAMINE HYDROCHLORIDE 50 MG/ML
50 INJECTION, SOLUTION INTRAMUSCULAR; INTRAVENOUS AS NEEDED
Status: CANCELLED
Start: 2020-05-05

## 2020-04-30 RX ORDER — DIPHENHYDRAMINE HYDROCHLORIDE 50 MG/ML
50 INJECTION, SOLUTION INTRAMUSCULAR; INTRAVENOUS AS NEEDED
Status: CANCELLED
Start: 2020-05-12

## 2020-04-30 RX ORDER — HEPARIN 100 UNIT/ML
300-500 SYRINGE INTRAVENOUS AS NEEDED
Status: CANCELLED
Start: 2020-05-12

## 2020-04-30 RX ORDER — ACETAMINOPHEN 325 MG/1
650 TABLET ORAL AS NEEDED
Status: CANCELLED
Start: 2020-05-05

## 2020-04-30 RX ORDER — ONDANSETRON 2 MG/ML
8 INJECTION INTRAMUSCULAR; INTRAVENOUS AS NEEDED
Status: CANCELLED | OUTPATIENT
Start: 2020-05-05

## 2020-04-30 RX ORDER — DIPHENHYDRAMINE HYDROCHLORIDE 50 MG/ML
25 INJECTION, SOLUTION INTRAMUSCULAR; INTRAVENOUS AS NEEDED
Status: CANCELLED
Start: 2020-05-05

## 2020-04-30 RX ORDER — HYDROCORTISONE SODIUM SUCCINATE 100 MG/2ML
100 INJECTION, POWDER, FOR SOLUTION INTRAMUSCULAR; INTRAVENOUS AS NEEDED
Status: CANCELLED | OUTPATIENT
Start: 2020-05-05

## 2020-04-30 RX ORDER — DIPHENHYDRAMINE HYDROCHLORIDE 50 MG/ML
25 INJECTION, SOLUTION INTRAMUSCULAR; INTRAVENOUS AS NEEDED
Status: CANCELLED
Start: 2020-05-12

## 2020-04-30 NOTE — PROGRESS NOTES
Collin Mcclure is a 63 y.o. 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 Have you been to the ER, urgent care clinic since your last visit? Hospitalized since your last visit? Yes, 4/21/20    2. Have you seen or consulted any other health care providers outside of the [address] [place] [address] since your last visit? Include any pap smears or colon screening.  No

## 2020-04-30 NOTE — PROGRESS NOTES
OPIC Short Note                   0915 Pt admit to [facility] for labs/ possible blood transfusion ambulatory in stable condition. Assessment completed. No new concerns voiced. Patient reports getting his PEG tube and port removed recently. Patient reports 0 out of 10 pain. Peripheral IV established in right outer forearm with brisk blood return, labs collected and sent for processing. CBC & type + screen sent. Flushed, capped and secured with coban. 1866 Patient went to MD appointment. 1110 Patient returned to [facility]. Call received from [facility] from Dr. Cindy Alba office. No blood transfusion today or hydration needed. Patient will have iron infusion set up in the future. Patient Vitals for the past 12 hrs:   Temp Pulse Resp BP   04/30/20 0917 97.6 °F (36.4 °C) (!) 121 18 120/79       Lab results were obtained and reviewed. Recent Results (from the past 12 hour(s))   CBC WITH AUTOMATED DIFF    Collection Time: 04/30/20  9:34 AM   Result Value Ref Range    WBC 3.3 (L) 4.1 - 11.1 K/uL    RBC 4.12 4.10 - 5.70 M/uL    HGB 7.9 (L) 12.1 - 17.0 g/dL    HCT 26.2 (L) 36.6 - 50.3 %    MCV 63.6 (L) 80.0 - 99.0 FL    MCH 19.2 (L) 26.0 - 34.0 PG    MCHC 30.2 30.0 - 36.5 g/dL    RDW 22.5 (H) 11.5 - 14.5 %    PLATELET 317 846 - 379 K/uL    MPV 9.6 8.9 - 12.9 FL    NRBC 0.0 0  WBC    ABSOLUTE NRBC 0.00 0.00 - 0.01 K/uL    NEUTROPHILS PENDING %    LYMPHOCYTES PENDING %    MONOCYTES PENDING %    EOSINOPHILS PENDING %    BASOPHILS PENDING %    IMMATURE GRANULOCYTES PENDING %    ABS. NEUTROPHILS PENDING K/UL    ABS. LYMPHOCYTES PENDING K/UL    ABS. MONOCYTES PENDING K/UL    ABS. EOSINOPHILS PENDING K/UL    ABS. BASOPHILS PENDING K/UL    ABS. IMM. GRANS. PENDING K/UL    DF PENDING      IV removed without difficulty per policy. Arm wrapped with 2x2 and coban. Mr. Erendira Avitia was discharged from [address] in stable condition at 1120.  Patient is aware of next appointment on 5/7/2020 at 9:00am.     Future Appointments   Date Time Provider Mayda Myriam   5/7/2020  9:00 AM H2 MIN [facility]   5/14/2020  9:00 AM H2 MIN [facility]   5/21/2020  9:00 AM H2 MIN [facility]   8/20/2020  2:50 PM Joan Scherer MD RDE [place] [id number] [facility] TONIA Reid  April 30, 2020

## 2020-04-30 NOTE — PROGRESS NOTES
[facility] [place] at [place] [address] [address], [address]  W: [phone]  F: [phone]    Reason for Visit:   Onur Cramer is a 63 y.o. 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 Size is measuring up to 2.5 cm in short axis. Right internal jugular vein is nearly fully compressed, no contralateral adenopathy seen. · 9/5/2019-ENT exam showed very inflamed tonsil on the right side with right Epley and surface, reportedly invading the pharyngeal wall-biopsy of the right tonsil showed invasive nonkeratinizing squamous cell carcinoma moderately differentiated  · 9/2019: PET CT Right tonsillar mass with hypermetabolic right cervical lymphadenopathy  · 11/6/19: cycle 1 cisplatin + RT  · 12/27/19: radiation completed     History of Present Illness:   Patient is a 63 y.o. male seen for SCC of the R tonsil    3 months ago he noted a small R neck swelling. Saw Dr. Herman Neville. Tried antibiotics for 10 days. Returned 8/9/19 and had an USG that showed adenopathy and then referred to Dr. Aguila Brenner. This led to above mentioned diagnosis. He went to the ER on Tuesday due to dizziness and shortness of breath. Workup showed ferritin of 5. Per patient, he received 1 unit PRBCs. Hgb 7.9 g/dL today. Last colonoscopy at 50years old. He states his stools are dark and have been since starting treatment. Denies HA, dizziness, shortness of breath, nausea/vomiting, constipation/diarrhea at this time. No other evidence of bleeding. Discussed continued dysphagia and poor PO intake. Per Juice Pichardo RD, instructed patient to continue with supplemental drinks and/or milk shakes with whole milk and powdered milk. Patient still has not received info about swallow screening.  Has been provided with swallow exercises and recipes. He quit smoking 10 years ago, had smoked 1 ppd for 28 years    Sister had stage IV endometrial cancer in her 46s  Another sister  of ovarian cancer in her 62s     [address] reviewed above    [facility] FACILITY   Reviewed under HPI      Reviewed under HPI    Current Outpatient Medications   Medication Sig    baclofen (LIORESAL) 10 mg tablet Take 1 Tab by mouth three (3) times daily as needed for Other (Hiccups).  pantoprazole (PROTONIX) 40 mg tablet Take 1 Tab by mouth daily.  saliva substitution (BIOTENE DRY MOUTH ORAL RINSE) mwsh mouthwash 15 mL by Mucous Membrane route as needed.  cyanocobalamin 1,000 mcg tablet Take 1,000 mcg by mouth daily.  scopolamine (TRANSDERM-SCOP) 1 mg over 3 days pt3d 1 Patch by TransDERmal route every seventy-two (72) hours.  methylPREDNISolone (MEDROL) 8 mg tablet Take 5 Tabs by mouth See Admin Instructions. Take 5 tabs by mouth (40mg) on days 2&3 of chemotherapy    nut tx, lact-reduced, iron (BOOST VHC) 0.09-2.25 gram-kcal/mL liqd 6 Cans by Per G Tube route daily.  aluminum-magnesium hydroxide 200-200 mg/5 mL susp 5 mL, diphenhydrAMINE 12.5 mg/5 mL liqd 12.5 mg, lidocaine 2 % soln 5 mL 5 mL by Swish and Spit route two (2) times a day. Maalox  Lidocaine 2% viscous   Diphenhydramine oral solution     Pharmacy to mix equal portions of ingredients to a total volume as indicated in the dispense amount. Swish&spit 5mL (1 tsp) 4 times daily as needed    ondansetron (ZOFRAN ODT) 8 mg disintegrating tablet Take 1 Tab by mouth every eight (8) hours as needed for Nausea.  hydroCHLOROthiazide (HYDRODIURIL) 12.5 mg tablet Take 12.5 mg by mouth daily.  atorvastatin (LIPITOR) 20 mg tablet Take  by mouth daily.  multivitamin (ONE A DAY) tablet Take 1 Tab by mouth daily. No current facility-administered medications for this visit.        No Known Allergies     Review of Systems: A complete review of systems was obtained, negative except as described above.    Physical Exam:     Visit Vitals  /72   Pulse 98   Temp 97.6 °F (36.4 °C)   Ht 5' 9\" (1.753 m)   Wt 135 lb (61.2 kg)   SpO2 99%   BMI 19.94 kg/m²     ECOG PS: 1  General: No distress  Eyes: PERRLA, anicteric sclerae  Neck: Supple  Respiratory: normal respiratory effort  CV: Normal rate, regular rhythm, no murmurs, no peripheral edema  GI: Soft, nontender, nondistended  MS: Normal gait and station. Digits without clubbing or cyanosis. Skin: No rashes, ecchymoses, or petechiae. Normal temperature, turgor, and texture. Psych: Alert, oriented, appropriate affect, normal judgment/insight    Results:     Lab Results   Component Value Date/Time    WBC 3.3 (L) 04/30/2020 09:34 AM    HGB 7.9 (L) 04/30/2020 09:34 AM    HCT 26.2 (L) 04/30/2020 09:34 AM    PLATELET 542 47/71/5004 09:34 AM    MCV 63.6 (L) 04/30/2020 09:34 AM    ABS. NEUTROPHILS PENDING 04/30/2020 09:34 AM     Lab Results   Component Value Date/Time    Sodium 138 04/22/2020 06:00 AM    Potassium 4.1 04/22/2020 06:00 AM    Chloride 108 04/22/2020 06:00 AM    CO2 25 04/22/2020 06:00 AM    Glucose 92 04/22/2020 06:00 AM    BUN 14 04/22/2020 06:00 AM    Creatinine 0.87 04/22/2020 06:00 AM    GFR est AA >60 04/22/2020 06:00 AM    GFR est non-AA >60 04/22/2020 06:00 AM    Calcium 9.6 04/22/2020 06:00 AM     Lab Results   Component Value Date/Time    Bilirubin, total 0.2 01/08/2020 10:26 AM    ALT (SGPT) 18 01/08/2020 10:26 AM    AST (SGOT) 13 (L) 01/08/2020 10:26 AM    Alk. phosphatase 81 01/08/2020 10:26 AM    Protein, total 5.7 (L) 01/08/2020 10:26 AM    Albumin 2.8 (L) 01/08/2020 10:26 AM    Globulin 2.9 01/08/2020 10:26 AM     Lab Results   Component Value Date/Time    Iron % saturation 22 04/22/2020 06:10 AM    TIBC 420 04/22/2020 06:10 AM    Ferritin 5 (L) 04/22/2020 06:10 AM     Lab Results   Component Value Date/Time    D-dimer 0.29 04/21/2020 06:55 PM       Records reviewed and summarized above. Pathology report(s) reviewed above.   Radiology report(s) reviewed above. CT neck 12/3/19: IMPRESSION:   1. Extensive right cervical lymphadenopathy as previously described, with a  very slight interval decrease in size but continued heterogeneous enhancement  consistent with necrosis. There is no abscess formation or liquefaction,  however. 2. Stranding of surrounding fat which may be related to treatment and or venous  congestion. 3. Intraluminal thrombus in the right internal jugular vein, new since the prior  study    Assessment:   1) R tonsil SCC  Per discussion with Dr. Mathew Allen the tonsillar mass may be involving the pharyngeal wall  CT suggests multiple ipsilateral nodes , atleast one ~3  cm and none > 6 cm  PET CT shows no distant mets  HPV positive    Clinically at least T2N1- Though could be T3N1 - HPV postive- stage I-II    Completed 2 cycles of Cisplatin (pt refused 3rd cycle) and completed RT on 12/26/19. PET 12 weeks later showed a CR and he is on surveillance    Continues to have mild dysphagia but otherwise toxicities have resolved. He comes in today for new onset severe anemia; see # 2.     2) Iron Deficiency Anemia  S/p 1 unit PRBCs  Hgb 7.9 g/dL today; no transfusion today. Per patient, black stools starting with treatment  Will order two doses IV injectafer. GI referral for EGD/Colonoscopy      3) Dysphagia  Grade 1  PEG removed. Swallow test pending, will f/u with scheduling   S/p fluconazole for thrush  Following with RD    4) Right IJ thrombus  R/t port; was started on xarelto. Started 12/3/19   Discontinued for possible GI bleed    5) Weight loss  States his appetite is decreased  Eating one meal per day   Supplementing with 4-5 nutritional drinks and snacking on ice cream bars.      6) H/O Smoking  Quit    7)  FH of Cancers  Should consider genetic counseling    Plan:     · IV injectafer x2 ordered   · GI referral for endoscopy/colonoscopy  · Continue to following with RD  · Hold xarelto  · Swallow study - follow up scheduling  · Once anemia has improved resume surveillance     RTC 4 weeks    I appreciate the opportunity to participate in Susanna Reinaldo clayton.     Signed By: Ml Field MD

## 2020-04-30 NOTE — PROGRESS NOTES
This note will not be viewable in [address] [facility] [place]  Social Work Navigator Encounter     Patient Name:  Lynne Christian     Medical History: Tonsil Cancer     Advance Directives: Patient does not have an advanced medical directive, and did not express interest in completing one today. Narrative:   Check on the patient's status. Notified the patient that his Care Card application was submitted. Patient is struggling to cope with job loss, financial challenges, and health challenges. Offered active listening and emotional support. Offered continued support to the patient as needed. Barriers to Care:   1.  Job loss   2. Financial challenges  3. Health challenges     Assessment/Action:  Ongoing psychosocial support as desired by patient.       Plan/Referral:   Insurance/Entitlements referral  Financial/Medication assistance referral   Agatha Hurd LCSW

## 2020-05-01 ENCOUNTER — TELEPHONE (OUTPATIENT)
Dept: ONCOLOGY | Age: 65
End: 2020-05-01

## 2020-05-01 DIAGNOSIS — C76.0 HEAD AND NECK CANCER (HCC): Primary | ICD-10-CM

## 2020-05-01 DIAGNOSIS — R13.10 DYSPHAGIA, UNSPECIFIED TYPE: ICD-10-CM

## 2020-05-01 NOTE — TELEPHONE ENCOUNTER
[facility] at [address], [facility] [place]: [phone]  F: [phone]    Medical Nutrition Therapy    Nutrition Encounter:    Called and spoke with patient. He states \"everyone is gaining up on me, you, Dr Rekha Franco and my wife\". Explained that we are concerned with his ongoing weight loss and want the best for him. He reports he lost his appetite but it has slowly improved. Although as of 11am today, he has not had anything to eat. He states yesterday- he had 4 nutrition shakes, a small serving of lasagna, small tuna casserole. He reports He reports the feeding tube has been removed. He struggles with swallowing certain foods, but is able to get down things like pasta. He reports coughing after eating and drinking occasionally. He has lost a significant amount of weight since treatment ended. Wt Readings from Last 5 Encounters:   04/30/20 135 lb (61.2 kg)   04/22/20 144 lb 10 oz (65.6 kg)   03/13/20 140 lb (63.5 kg)   02/05/20 145 lb 9.6 oz (66 kg)   01/22/20 148 lb 9.6 oz (67.4 kg)       Plan:  - Scheduled for MBS at [facility] at 1pm. He should arrive at 12:30 and go to outpatient registration in the [facility].   - Continue with 5-6 nutrition supplements per day  - He should be eating every 2 hours. Discussed with him and his wife.       Signed By: Leonie Hills, [address] , [address] [facility] [address] Seen in office yesterday by Dr. Figueroa.

## 2020-05-05 ENCOUNTER — HOSPITAL ENCOUNTER (OUTPATIENT)
Dept: GENERAL RADIOLOGY | Age: 65
Discharge: HOME | End: 2020-05-05
Attending: REGISTERED NURSE
Payer: COMMERCIAL

## 2020-05-05 ENCOUNTER — HOSPITAL ENCOUNTER (OUTPATIENT)
Dept: INFUSION THERAPY | Age: 65
Discharge: HOME | End: 2020-05-05
Payer: SELF-PAY

## 2020-05-05 VITALS
HEART RATE: 94 BPM | SYSTOLIC BLOOD PRESSURE: 111 MMHG | DIASTOLIC BLOOD PRESSURE: 71 MMHG | RESPIRATION RATE: 18 BRPM | TEMPERATURE: 98.1 F

## 2020-05-05 DIAGNOSIS — D50.9 IRON DEFICIENCY ANEMIA, UNSPECIFIED IRON DEFICIENCY ANEMIA TYPE: Primary | ICD-10-CM

## 2020-05-05 DIAGNOSIS — C76.0 HEAD AND NECK CANCER (HCC): ICD-10-CM

## 2020-05-05 DIAGNOSIS — C09.9 TONSIL CANCER (HCC): ICD-10-CM

## 2020-05-05 DIAGNOSIS — R13.10 DYSPHAGIA, UNSPECIFIED TYPE: ICD-10-CM

## 2020-05-05 PROCEDURE — 92611 MOTION FLUOROSCOPY/SWALLOW: CPT

## 2020-05-05 PROCEDURE — 74011250636 HC RX REV CODE- 250/636: Performed by: NURSE PRACTITIONER

## 2020-05-05 PROCEDURE — 74230 X-RAY XM SWLNG FUNCJ C+: CPT

## 2020-05-05 PROCEDURE — 96365 THER/PROPH/DIAG IV INF INIT: CPT

## 2020-05-05 PROCEDURE — 92526 ORAL FUNCTION THERAPY: CPT

## 2020-05-05 RX ORDER — SODIUM CHLORIDE 0.9 % (FLUSH) 0.9 %
10 SYRINGE (ML) INJECTION AS NEEDED
Status: DISCONTINUED | OUTPATIENT
Start: 2020-05-05 | End: 2020-05-06 | Stop reason: HOSPADM

## 2020-05-05 RX ORDER — SODIUM CHLORIDE 9 MG/ML
500 INJECTION, SOLUTION INTRAVENOUS ONCE
Status: COMPLETED | OUTPATIENT
Start: 2020-05-05 | End: 2020-05-05

## 2020-05-05 RX ORDER — HEPARIN 100 UNIT/ML
300-500 SYRINGE INTRAVENOUS AS NEEDED
Status: DISCONTINUED | OUTPATIENT
Start: 2020-05-05 | End: 2020-05-06 | Stop reason: HOSPADM

## 2020-05-05 RX ORDER — SODIUM CHLORIDE 9 MG/ML
10 INJECTION INTRAMUSCULAR; INTRAVENOUS; SUBCUTANEOUS AS NEEDED
Status: DISCONTINUED | OUTPATIENT
Start: 2020-05-05 | End: 2020-05-06 | Stop reason: HOSPADM

## 2020-05-05 RX ADMIN — SODIUM CHLORIDE 500 ML: 900 INJECTION, SOLUTION INTRAVENOUS at 14:52

## 2020-05-05 RX ADMIN — SODIUM CHLORIDE 750 MG: 900 INJECTION, SOLUTION INTRAVENOUS at 14:52

## 2020-05-05 NOTE — PROGRESS NOTES
Federico Herbert  [address] [place] [address] STUDY  Patient: Nino Jalloh (82 y.o. male)  Date: 5/5/2020  Referring Provider:  Cherylyn Essex, NP    SUBJECTIVE:   Pt reportedly is s/p chemo/radiation for tonsillar cancer. He reports difficulty swallowing both pills and solid consistencies. States that he feels things getting stuck. OBJECTIVE:   Past Medical History:   Past Medical History:   Diagnosis Date    Cancer [facility])     Throat     Past Surgical History:   Procedure Laterality Date    IR INSERT GASTROSTOMY TUBE PERC  10/10/2019    IR INSERT TUNL CVC W PORT OVER 5 YEARS  10/10/2019    IR REMOVE TUNL CVAD W PORT/PUMP  4/1/2020    IR REPLACE GASTRO/CECOSTOMY TUBE PERC  1/8/2020     Current Dietary Status:  Regular diet/thin liquids but chart review shows that he is not eating a lot  Radiologist: Dr. Saravanan Ramirez: Lateral;Fluoro  Patient Position: upright in hausted chair    Trial 1:   Consistency Presented: Thin liquid; Nectar thick liquid;Puree   How Presented: Self-fed/presented;Cup/sip;Spoon; Successive swallows   Consistency Amount: (150 cc thin Ba; 20 cc nectar-thick Ba; 2 tsp Ba paste)   Bolus Acceptance: No impairment   Bolus Formation/Control: No impairment:     Propulsion: No impairment   Oral Residue: None   Initiation of Swallow: Triggered at vallecula   Timing: No impairment   Penetration: After swallow; To laryngeal vestibule;From residual   Aspiration/Timing: No evidence of aspiration   Pharyngeal Clearance: Vallecular residue;Pyriform residue ;10-50%   Attempted Modifications: Alternate liquids/solids   Effective Modifications: Alternate liquids/solids(to an extent)       Comments: nasopharyngeal reflux from residual       Decreased Tongue Base Retraction?: Yes  Laryngeal Elevation: Inadequate epiglottic inversion; Incomplete laryngeal closure;Minimal movement of larynx/epiglottis  Aspiration/Penetration Score: 4 (Penetration/No residue-Contrast contacts the folds, and is ejected)  Pharyngeal Symmetry: Not assessed  Pharyngeal-Esophageal Segment: Decreased relaxation of upper esophageal segment  Pharyngeal Dysfunction: Decreased strength;Decreased tongue base retraction;Decreased elevation/closure     Dysphagia Treatment:  Pt seen immediately following MBS for education. Utilized images from study to discuss pt's swallow dysfunction. Spoke about aspiration precautions and strategies to help him swallow easier. Also discussed importance of following up with SLP outpatient. Pt stated that he would be happy to follow up and ask that I contact his RD. SLP called RD and left a message. Awaiting a return call. Oral Phase Severity: No impairment  Pharyngeal Phase Severity: Moderate    ASSESSMENT :  Based on the objective data described above, the patient presents with functional oral and moderate pharyngeal dysphagia. Pharyngeal dysphagia characterized by significant pharyngeal weakness including minimal hyolaryngeal excursion and incomplete epiglottic retroflexion. Because of this weakness and reduced epiglottic retroflexion, pt with a moderate amount of vallecular residue with all consistencies. Some of this residue spills over into the laryngeal vestibule, and then patient is sensate is clears airway of penetrated contents. The residue that does not enter the esophagus sits in the valleculae as patient swallows multiple times to clear valleculae. Ultimately, pt able to get some of the residue down the esophagus, however, some of it escapes up through the velopharyngeal port and into the nasal cavity. No aspiration appreciated. Suspect pt's current swallow dysfunction is the direct result of radiation to the neck area with ?fibrotic tissue forming and fullness present throughout pharynx.   At this juncture, would recommend pt initiate diet as outlined below, however, concerned that pt will not be able to maintain his nutrition. Appreciate further RD input. Furthermore, would strongly recommend outpatient SLP follow-up so that patient can learn laryngeal strengthening exercises to utilize swallow muscles so they do not become more stiff. PLAN/RECOMMENDATIONS :  --Ground/pureed diet with thin liquids  --small single sips/bites  --alternate liquids/solids  --fully upright during meals  --pt will clear his throat- this is his sensation to penetration and should be encouraged  --follow-up OP SLP (patient requests [facility] in [place])  --meds crushed   --appreciate continued RD input as concern about pt maintaining nutrition given difficulty swallowing     COMMUNICATION/EDUCATION:   The above findings and recommendations were faxed to referring NP. SLP called RD to discuss and awaiting call-back.      Thank you for this referral.  BRANT Moody  Time Calculation: 41 mins

## 2020-05-05 NOTE — PROGRESS NOTES
Outpatient Infusion Center Progress Note    1410 Pt admit to [facility] ambulatory in stable condition. Assessment completed. No new concerns voiced. Peripheral IV established in the RIGHT arm with positive blood return. Patient connected to KVO fluids and medications ordered from the pharmacy. While waiting for medications, patient was provided with first dose instructions. Pt verbalized understanding. Visit Vitals  /64 (BP 1 Location: Right arm, BP Patient Position: Sitting)   Pulse 98   Temp 96.8 °F (36 °C)   Resp 18       Medications Administered     0.9% sodium chloride infusion 500 mL     Admin Date  05/05/2020 Action  New Bag Dose  500 mL Rate  25 mL/hr Route  IntraVENous Administered By  Kaylie Joe          ferric carboxymaltose (INJECTAFER) 750 mg in 0.9% sodium chloride 250 mL, overfill volume 25 mL IVPB     Admin Date  05/05/2020 Action  Given Dose  750 mg Rate  870 mL/hr Route  IntraVENous Administered By  Kaylie Joe                1600 Pt tolerated treatment well. IV maintained positive blood return throughout the course of treatment and was removed at the conclusion of therapy. D/c home ambulatory in no distress.  Pt aware of next appointment scheduled for 05/09/20    Brooks Barthel, RN

## 2020-05-06 ENCOUNTER — TELEPHONE (OUTPATIENT)
Dept: ONCOLOGY | Age: 65
End: 2020-05-06

## 2020-05-06 NOTE — TELEPHONE ENCOUNTER
[facility] [place] [address] [place]: [phone]  F: [phone]    Medical Nutrition Therapy    Nutrition Encounter:    MBS completed yesterday. Attempted to reach out to SLP- awaiting call. SLP recommended outpatient follow up. Reached out to [facility] regarding financial assistance. He has lost a significant amount of weight since treatment ended. Wt Readings from Last 5 Encounters:   04/30/20 135 lb (61.2 kg)   04/22/20 144 lb 10 oz (65.6 kg)   03/13/20 140 lb (63.5 kg)   02/05/20 145 lb 9.6 oz (66 kg)   01/22/20 148 lb 9.6 oz (67.4 kg)       Plan:  - Determine if [facility] still provides financial assistance and if the application is the same.    - RD reached out to [facility] rep. - Plan to discuss MBS outcome with SLP. Once above figured out, will call patient.       Signed By: Alexandra Gomez, [address], [address] [place] , [address] [facility]

## 2020-05-08 ENCOUNTER — TELEPHONE (OUTPATIENT)
Dept: ONCOLOGY | Age: 65
End: 2020-05-08

## 2020-05-08 NOTE — TELEPHONE ENCOUNTER
[facility] at [address]  [place]: [phone]  F: [phone]    Medical Nutrition Therapy    Nutrition Encounter:    MBS completed on 5/5. Spoke with SLP and reached out to [address] [place] [address] regarding financial assistance. They offer a financial assistance on a case by case basis, application required. Called patient to discuss filling out the application. He states that he called his insurance company and his insurance is still active. Will attempt to him get scheduled at [facility]. Initial referral was faxed on 4/16. Reached out to [address] [place] [address] for assistance on getting this patient scheduled. Also called and spoke with rep and stated they did not receive the fax but was able to schedule appointment for May 22nd at 10:15 at [facility]. He has lost a significant amount of weight since treatment ended. Wt Readings from Last 5 Encounters:   04/30/20 135 lb (61.2 kg)   04/22/20 144 lb 10 oz (65.6 kg)   03/13/20 140 lb (63.5 kg)   02/05/20 145 lb 9.6 oz (66 kg)   01/22/20 148 lb 9.6 oz (67.4 kg)       Plan:  - Scheduled for appointment at [address] [place] [address] at [facility] office on May 22nd at 10:15am. Called and told patient.    - Re-faxing referral     Signed By: Camryn Tejeda, [address] [place] [address], [address] [place] [address] , [address] [facility]

## 2020-05-12 ENCOUNTER — HOSPITAL ENCOUNTER (OUTPATIENT)
Dept: INFUSION THERAPY | Age: 65
Discharge: HOME | End: 2020-05-12
Payer: SELF-PAY

## 2020-05-12 VITALS
TEMPERATURE: 97.2 F | RESPIRATION RATE: 18 BRPM | SYSTOLIC BLOOD PRESSURE: 122 MMHG | HEART RATE: 86 BPM | DIASTOLIC BLOOD PRESSURE: 73 MMHG

## 2020-05-12 DIAGNOSIS — C09.9 TONSIL CANCER (HCC): ICD-10-CM

## 2020-05-12 DIAGNOSIS — D50.9 IRON DEFICIENCY ANEMIA, UNSPECIFIED IRON DEFICIENCY ANEMIA TYPE: Primary | ICD-10-CM

## 2020-05-12 PROCEDURE — 96365 THER/PROPH/DIAG IV INF INIT: CPT

## 2020-05-12 PROCEDURE — 74011250636 HC RX REV CODE- 250/636: Performed by: NURSE PRACTITIONER

## 2020-05-12 PROCEDURE — 96375 TX/PRO/DX INJ NEW DRUG ADDON: CPT

## 2020-05-12 PROCEDURE — 74011000250 HC RX REV CODE- 250: Performed by: NURSE PRACTITIONER

## 2020-05-12 RX ORDER — HYDROCORTISONE SODIUM SUCCINATE 100 MG/2ML
100 INJECTION, POWDER, FOR SOLUTION INTRAMUSCULAR; INTRAVENOUS AS NEEDED
Status: DISPENSED | OUTPATIENT
Start: 2020-05-12 | End: 2020-05-12

## 2020-05-12 RX ORDER — SODIUM CHLORIDE 9 MG/ML
500 INJECTION, SOLUTION INTRAVENOUS ONCE
Status: COMPLETED | OUTPATIENT
Start: 2020-05-12 | End: 2020-05-12

## 2020-05-12 RX ORDER — DIPHENHYDRAMINE HYDROCHLORIDE 50 MG/ML
25 INJECTION, SOLUTION INTRAMUSCULAR; INTRAVENOUS AS NEEDED
Status: DISPENSED | OUTPATIENT
Start: 2020-05-12 | End: 2020-05-12

## 2020-05-12 RX ADMIN — DIPHENHYDRAMINE HYDROCHLORIDE 25 MG: 50 INJECTION INTRAMUSCULAR; INTRAVENOUS at 11:56

## 2020-05-12 RX ADMIN — SODIUM CHLORIDE 20 MG: 9 INJECTION INTRAMUSCULAR; INTRAVENOUS; SUBCUTANEOUS at 11:51

## 2020-05-12 RX ADMIN — SODIUM CHLORIDE 500 ML: 900 INJECTION, SOLUTION INTRAVENOUS at 11:48

## 2020-05-12 RX ADMIN — HYDROCORTISONE SODIUM SUCCINATE 100 MG: 100 INJECTION, POWDER, FOR SOLUTION INTRAMUSCULAR; INTRAVENOUS at 11:53

## 2020-05-12 RX ADMIN — SODIUM CHLORIDE 750 MG: 900 INJECTION, SOLUTION INTRAVENOUS at 12:18

## 2020-05-12 NOTE — PROGRESS NOTES
Outpatient Infusion Center Progress Note    5504 Pt admit to [place] for Ferric CARBOXYMALTOSE (Injectafer) ambulatory in stable condition. Assessment completed PIV established in the [facility] with good blood return. Patient stated \"I don't want any Iron infusion today, that made me itch for a long time last time I got one. \" I called Khadijah Lazaro NP and she was able to talk to the patient over the phone. They both agreed to try the iron infusion with IV benadryl, solucortef, and Pepcid as pre meds. He tolerated the infusion well and he waited the 30 minutes post infusion without incident of itching    Visit Vitals  /73   Pulse 86   Temp 97.2 °F (36.2 °C)   Resp 18       Medications:  Medications Administered     0.9% sodium chloride infusion 500 mL     Admin Date  05/12/2020 Action  New Bag Dose  500 mL Rate  25 mL/hr Route  IntraVENous Administered By  Nolan Peraza RN          diphenhydrAMINE (BENADRYL) injection 25 mg     Admin Date  05/12/2020 Action  Given Dose  25 mg Route  IntraVENous Administered By  Nolan Peraza RN          famotidine (PF) (PEPCID) 20 mg in 0.9% sodium chloride 10 mL injection     Admin Date  05/12/2020 Action  Given Dose  20 mg Route  IntraVENous Administered By  Nolan Peraza RN          ferric carboxymaltose (INJECTAFER) 750 mg in 0.9% sodium chloride 250 mL, overfill volume 25 mL IVPB     Admin Date  05/12/2020 Action  Given Dose  750 mg Rate  870 mL/hr Route  IntraVENous Administered By  Nolan Peraza RN          hydrocortisone Sod Succ (PF) (SOLU-CORTEF) injection 100 mg     Admin Date  05/12/2020 Action  Given Dose  100 mg Route  IntraVENous Administered By  Nolan Peraza RN                2483 Pt tolerated treatment well. D/c home ambulatory in no distress. Pt aware of next appointment scheduled for 5/14/2020 (for lab draw and possible transfusion).

## 2020-05-14 ENCOUNTER — HOSPITAL ENCOUNTER (OUTPATIENT)
Dept: INFUSION THERAPY | Age: 65
Discharge: HOME | End: 2020-05-14
Payer: SELF-PAY

## 2020-05-14 VITALS
SYSTOLIC BLOOD PRESSURE: 122 MMHG | RESPIRATION RATE: 16 BRPM | TEMPERATURE: 97.1 F | HEART RATE: 86 BPM | DIASTOLIC BLOOD PRESSURE: 70 MMHG

## 2020-05-14 DIAGNOSIS — K92.2 GASTROINTESTINAL HEMORRHAGE, UNSPECIFIED GASTROINTESTINAL HEMORRHAGE TYPE: Primary | ICD-10-CM

## 2020-05-14 DIAGNOSIS — C09.9 TONSIL CANCER (HCC): ICD-10-CM

## 2020-05-14 DIAGNOSIS — R13.10 DYSPHAGIA, UNSPECIFIED TYPE: ICD-10-CM

## 2020-05-14 DIAGNOSIS — C76.0 HEAD AND NECK CANCER (HCC): Primary | ICD-10-CM

## 2020-05-14 LAB
BASOPHILS # BLD: 0 K/UL (ref 0–0.1)
BASOPHILS NFR BLD: 0 % (ref 0–1)
DIFFERENTIAL METHOD BLD: ABNORMAL
EOSINOPHIL # BLD: 0.4 K/UL (ref 0–0.4)
EOSINOPHIL NFR BLD: 10 % (ref 0–7)
ERYTHROCYTE [DISTWIDTH] IN BLOOD BY AUTOMATED COUNT: 27.6 % (ref 11.5–14.5)
HCT VFR BLD AUTO: 30.3 % (ref 36.6–50.3)
HGB BLD-MCNC: 9 G/DL (ref 12.1–17)
IMM GRANULOCYTES # BLD AUTO: 0 K/UL (ref 0–0.04)
IMM GRANULOCYTES NFR BLD AUTO: 0 % (ref 0–0.5)
LYMPHOCYTES # BLD: 0.8 K/UL (ref 0.8–3.5)
LYMPHOCYTES NFR BLD: 20 % (ref 12–49)
MCH RBC QN AUTO: 19.7 PG (ref 26–34)
MCHC RBC AUTO-ENTMCNC: 29.7 G/DL (ref 30–36.5)
MCV RBC AUTO: 66.2 FL (ref 80–99)
MONOCYTES # BLD: 0.4 K/UL (ref 0–1)
MONOCYTES NFR BLD: 11 % (ref 5–13)
NEUTS SEG # BLD: 2.2 K/UL (ref 1.8–8)
NEUTS SEG NFR BLD: 59 % (ref 32–75)
NRBC # BLD: 0 K/UL (ref 0–0.01)
NRBC BLD-RTO: 0 PER 100 WBC
PLATELET # BLD AUTO: 246 K/UL (ref 150–400)
PMV BLD AUTO: 10 FL (ref 8.9–12.9)
RBC # BLD AUTO: 4.58 M/UL (ref 4.1–5.7)
RBC MORPH BLD: ABNORMAL
WBC # BLD AUTO: 3.8 K/UL (ref 4.1–11.1)

## 2020-05-14 PROCEDURE — 36415 COLL VENOUS BLD VENIPUNCTURE: CPT

## 2020-05-14 PROCEDURE — 85025 COMPLETE CBC W/AUTO DIFF WBC: CPT

## 2020-05-14 RX ORDER — FLUCONAZOLE 200 MG/1
200 TABLET ORAL DAILY
Qty: 7 TAB | Refills: 0 | Status: SHIPPED | OUTPATIENT
Start: 2020-05-14 | End: 2020-05-21

## 2020-05-14 NOTE — PROGRESS NOTES
Outpatient Infusion Center Short Visit Progress Note    2156 Pt admit to [facility] for labs/hydration/possible transfusion ambulatory in stable condition. Assessment completed. No new concerns voiced. PIV access established in R arm with positive blood return; labs drawn and sent for processing; line capped. Awaiting lab results. Pt declined today's hydration. Patient Vitals for the past 12 hrs:   Temp Pulse Resp BP   05/14/20 0917 97.1 °F (36.2 °C) 86 16 122/70         1000 Labs resulted; Hgb, 9.0, per orders no transfusion needed; discussed w/ patient, verbalized understanding. PIV removed at discharge. D/c home ambulatory in no distress. Pt aware of next appointment scheduled for 05/21/2020. Recent Results (from the past 12 hour(s))   CBC WITH AUTOMATED DIFF    Collection Time: 05/14/20  9:23 AM   Result Value Ref Range    WBC 3.8 (L) 4.1 - 11.1 K/uL    RBC 4.58 4.10 - 5.70 M/uL    HGB 9.0 (L) 12.1 - 17.0 g/dL    HCT 30.3 (L) 36.6 - 50.3 %    MCV 66.2 (L) 80.0 - 99.0 FL    MCH 19.7 (L) 26.0 - 34.0 PG    MCHC 29.7 (L) 30.0 - 36.5 g/dL    RDW 27.6 (H) 11.5 - 14.5 %    PLATELET 403 694 - 852 K/uL    MPV 10.0 8.9 - 12.9 FL    NRBC 0.0 0  WBC    ABSOLUTE NRBC 0.00 0.00 - 0.01 K/uL    NEUTROPHILS 59 32 - 75 %    LYMPHOCYTES 20 12 - 49 %    MONOCYTES 11 5 - 13 %    EOSINOPHILS 10 (H) 0 - 7 %    BASOPHILS 0 0 - 1 %    IMMATURE GRANULOCYTES 0 0.0 - 0.5 %    ABS. NEUTROPHILS 2.2 1.8 - 8.0 K/UL    ABS. LYMPHOCYTES 0.8 0.8 - 3.5 K/UL    ABS. MONOCYTES 0.4 0.0 - 1.0 K/UL    ABS. EOSINOPHILS 0.4 0.0 - 0.4 K/UL    ABS. BASOPHILS 0.0 0.0 - 0.1 K/UL    ABS. IMM.  GRANS. 0.0 0.00 - 0.04 K/UL    DF SMEAR SCANNED      RBC COMMENTS HYPOCHROMIA  4+        RBC COMMENTS ANISOCYTOSIS  3+        RBC COMMENTS MICROCYTOSIS  2+

## 2020-05-21 ENCOUNTER — HOSPITAL ENCOUNTER (OUTPATIENT)
Dept: INFUSION THERAPY | Age: 65
Discharge: HOME | End: 2020-05-21
Payer: SELF-PAY

## 2020-05-21 VITALS
SYSTOLIC BLOOD PRESSURE: 123 MMHG | HEART RATE: 85 BPM | TEMPERATURE: 97.1 F | RESPIRATION RATE: 16 BRPM | DIASTOLIC BLOOD PRESSURE: 80 MMHG

## 2020-05-21 LAB
ABO + RH BLD: NORMAL
BASOPHILS # BLD: 0 K/UL (ref 0–0.1)
BASOPHILS NFR BLD: 1 % (ref 0–1)
BLOOD GROUP ANTIBODIES SERPL: NORMAL
DIFFERENTIAL METHOD BLD: ABNORMAL
EOSINOPHIL # BLD: 0.4 K/UL (ref 0–0.4)
EOSINOPHIL NFR BLD: 9 % (ref 0–7)
ERYTHROCYTE [DISTWIDTH] IN BLOOD BY AUTOMATED COUNT: 29.3 % (ref 11.5–14.5)
HCT VFR BLD AUTO: 34 % (ref 36.6–50.3)
HGB BLD-MCNC: 10.3 G/DL (ref 12.1–17)
IMM GRANULOCYTES # BLD AUTO: 0 K/UL (ref 0–0.04)
IMM GRANULOCYTES NFR BLD AUTO: 0 % (ref 0–0.5)
LYMPHOCYTES # BLD: 1.1 K/UL (ref 0.8–3.5)
LYMPHOCYTES NFR BLD: 26 % (ref 12–49)
MCH RBC QN AUTO: 21.1 PG (ref 26–34)
MCHC RBC AUTO-ENTMCNC: 30.3 G/DL (ref 30–36.5)
MCV RBC AUTO: 69.5 FL (ref 80–99)
MONOCYTES # BLD: 0.5 K/UL (ref 0–1)
MONOCYTES NFR BLD: 12 % (ref 5–13)
NEUTS SEG # BLD: 2.4 K/UL (ref 1.8–8)
NEUTS SEG NFR BLD: 52 % (ref 32–75)
NRBC # BLD: 0 K/UL (ref 0–0.01)
NRBC BLD-RTO: 0 PER 100 WBC
PLATELET # BLD AUTO: 206 K/UL (ref 150–400)
RBC # BLD AUTO: 4.89 M/UL (ref 4.1–5.7)
RBC MORPH BLD: ABNORMAL
SPECIMEN EXP DATE BLD: NORMAL
WBC # BLD AUTO: 4.4 K/UL (ref 4.1–11.1)

## 2020-05-21 PROCEDURE — 36415 COLL VENOUS BLD VENIPUNCTURE: CPT

## 2020-05-21 PROCEDURE — 85025 COMPLETE CBC W/AUTO DIFF WBC: CPT

## 2020-05-21 PROCEDURE — 86900 BLOOD TYPING SEROLOGIC ABO: CPT

## 2020-05-21 NOTE — ROUTINE PROCESS
0910 Pt admit to [facility] for Labs/Possible blood transfusion ambulatory in stable condition. Assessment completed. No new concerns voiced. Labs drawn peripherally per order and sent for processing. Labs reviewed,  Hgb 10.3 per order transfusion not required. Visit Vitals  /80   Pulse 85   Temp 97.1 °F (36.2 °C)   Resp 16         1010 Pt tolerated treatment well. D/c home ambulatory in no distress. Pt aware of next [facility] appointment scheduled for 5/28/2020. Recent Results (from the past 12 hour(s))   CBC WITH AUTOMATED DIFF    Collection Time: 05/21/20  9:12 AM   Result Value Ref Range    WBC 4.4 4.1 - 11.1 K/uL    RBC 4.89 4. 10 - 5.70 M/uL    HGB 10.3 (L) 12.1 - 17.0 g/dL    HCT 34.0 (L) 36.6 - 50.3 %    MCV 69.5 (L) 80.0 - 99.0 FL    MCH 21.1 (L) 26.0 - 34.0 PG    MCHC 30.3 30.0 - 36.5 g/dL    RDW 29.3 (H) 11.5 - 14.5 %    PLATELET 369 096 - 868 K/uL    NRBC 0.0 0  WBC    ABSOLUTE NRBC 0.00 0.00 - 0.01 K/uL    NEUTROPHILS 52 32 - 75 %    LYMPHOCYTES 26 12 - 49 %    MONOCYTES 12 5 - 13 %    EOSINOPHILS 9 (H) 0 - 7 %    BASOPHILS 1 0 - 1 %    IMMATURE GRANULOCYTES 0 0.0 - 0.5 %    ABS. NEUTROPHILS 2.4 1.8 - 8.0 K/UL    ABS. LYMPHOCYTES 1.1 0.8 - 3.5 K/UL    ABS. MONOCYTES 0.5 0.0 - 1.0 K/UL    ABS. EOSINOPHILS 0.4 0.0 - 0.4 K/UL    ABS. BASOPHILS 0.0 0.0 - 0.1 K/UL    ABS. IMM.  GRANS. 0.0 0.00 - 0.04 K/UL    DF SMEAR SCANNED      RBC COMMENTS ANISOCYTOSIS  2+        RBC COMMENTS MICROCYTOSIS  2+        RBC COMMENTS HYPOCHROMIA  2+       TYPE & SCREEN    Collection Time: 05/21/20  9:12 AM   Result Value Ref Range    Crossmatch Expiration 05/24/2020     ABO/Rh(D) A POSITIVE     Antibody screen NEG

## 2020-05-28 ENCOUNTER — HOSPITAL ENCOUNTER (OUTPATIENT)
Dept: INFUSION THERAPY | Age: 65
End: 2020-05-28
Payer: SELF-PAY

## 2020-05-28 ENCOUNTER — VIRTUAL VISIT (OUTPATIENT)
Dept: ONCOLOGY | Age: 65
End: 2020-05-28

## 2020-05-28 DIAGNOSIS — D50.9 IRON DEFICIENCY ANEMIA, UNSPECIFIED IRON DEFICIENCY ANEMIA TYPE: ICD-10-CM

## 2020-05-28 DIAGNOSIS — C76.0 HEAD AND NECK CANCER (HCC): Primary | ICD-10-CM

## 2020-05-28 NOTE — PROGRESS NOTES
[facility] [place] at [place] [address] [address] [place]: [phone]  F: [phone]    Reason for Visit:   Cyn Sánchez is a 63 y.o. 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 Size is measuring up to 2.5 cm in short axis. Right internal jugular vein is nearly fully compressed, no contralateral adenopathy seen. · 9/5/2019-ENT exam showed very inflamed tonsil on the right side with right Epley and surface, reportedly invading the pharyngeal wall-biopsy of the right tonsil showed invasive nonkeratinizing squamous cell carcinoma moderately differentiated  · 9/2019: PET CT Right tonsillar mass with hypermetabolic right cervical lymphadenopathy  · 11/6/19: cycle 1 cisplatin + RT  · 12/27/19: radiation completed   · 3/2020: PET with CR    History of Present Illness:   Patient is a 63 y.o. male seen for SCC of the R tonsil    3 months ago he noted a small R neck swelling. Saw Dr. Jeffery Boyd. Tried antibiotics for 10 days. Returned 8/9/19 and had an USG that showed adenopathy and then referred to Dr. Ira Foster. This led to above mentioned diagnosis and treatments and he has now been on surveillance. He did have anemia of blood loss on Xarelto in April 2020 when Xarelto was discontinued. He received Injectafer x 2    Now seen for follow up. Has scans tomorrow but prefers to be seen today. He tolerated injectafer well. He has no bleeding. He has not seen GI yet. His neck is not swollen. He still has dysphagia and had a barium study 5/5/2020 - had evidence of stasis in the oropharynx and hypopharynx. He had an appointment with Dr. Ira Foster and was thought to have thrush - on nystatin. Weight stable.    He quit smoking 10 years ago, had smoked 1 ppd for 28 years    Sister had stage IV endometrial cancer in her 46s  Another sister  of ovarian cancer in her 62s     [address] reviewed above    [address]   Reviewed under HPI      Reviewed under HPI    Current Outpatient Medications   Medication Sig    baclofen (LIORESAL) 10 mg tablet Take 1 Tab by mouth three (3) times daily as needed for Other (Hiccups).  pantoprazole (PROTONIX) 40 mg tablet Take 1 Tab by mouth daily.  saliva substitution (BIOTENE DRY MOUTH ORAL RINSE) mwsh mouthwash 15 mL by Mucous Membrane route as needed.  cyanocobalamin 1,000 mcg tablet Take 1,000 mcg by mouth daily.  scopolamine (TRANSDERM-SCOP) 1 mg over 3 days pt3d 1 Patch by TransDERmal route every seventy-two (72) hours.  methylPREDNISolone (MEDROL) 8 mg tablet Take 5 Tabs by mouth See Admin Instructions. Take 5 tabs by mouth (40mg) on days 2&3 of chemotherapy    nut tx, lact-reduced, iron (BOOST VHC) 0.09-2.25 gram-kcal/mL liqd 6 Cans by Per G Tube route daily.  aluminum-magnesium hydroxide 200-200 mg/5 mL susp 5 mL, diphenhydrAMINE 12.5 mg/5 mL liqd 12.5 mg, lidocaine 2 % soln 5 mL 5 mL by Swish and Spit route two (2) times a day. Maalox  Lidocaine 2% viscous   Diphenhydramine oral solution     Pharmacy to mix equal portions of ingredients to a total volume as indicated in the dispense amount. Swish&spit 5mL (1 tsp) 4 times daily as needed    ondansetron (ZOFRAN ODT) 8 mg disintegrating tablet Take 1 Tab by mouth every eight (8) hours as needed for Nausea.  hydroCHLOROthiazide (HYDRODIURIL) 12.5 mg tablet Take 12.5 mg by mouth daily.  atorvastatin (LIPITOR) 20 mg tablet Take  by mouth daily.  multivitamin (ONE A DAY) tablet Take 1 Tab by mouth daily. No current facility-administered medications for this visit. No Known Allergies     Review of Systems: A complete review of systems was obtained, negative except as described above.     Physical Exam:       Due to this being a TeleHealth evaluation, many elements of the physical examination are unable to be assessed. Constitutional: Appears well-developed and well-nourished in no apparent distress   Mental status: Alert and awake, Oriented to person/place/time, Able to follow commands, Voice is hoarse,  Eyes: EOM normal, Sclera normal, No visible ocular discharge  HENT: Normocephalic, atraumatic; Mouth/Throat: Moist mucous membranes, External Ears normal  Neck: No visualized mass  Pulmonary/Chest: Respiratory effort normal, No visualized signs of difficulty breathing or respiratory distress   Psychiatric: Normal affect, normal judgment/insight. No hallucinations       Results:     Lab Results   Component Value Date/Time    WBC 4.4 05/21/2020 09:12 AM    HGB 10.3 (L) 05/21/2020 09:12 AM    HCT 34.0 (L) 05/21/2020 09:12 AM    PLATELET 732 21/46/8664 09:12 AM    MCV 69.5 (L) 05/21/2020 09:12 AM    ABS. NEUTROPHILS 2.4 05/21/2020 09:12 AM     Lab Results   Component Value Date/Time    Sodium 138 04/22/2020 06:00 AM    Potassium 4.1 04/22/2020 06:00 AM    Chloride 108 04/22/2020 06:00 AM    CO2 25 04/22/2020 06:00 AM    Glucose 92 04/22/2020 06:00 AM    BUN 14 04/22/2020 06:00 AM    Creatinine 0.87 04/22/2020 06:00 AM    GFR est AA >60 04/22/2020 06:00 AM    GFR est non-AA >60 04/22/2020 06:00 AM    Calcium 9.6 04/22/2020 06:00 AM     Lab Results   Component Value Date/Time    Bilirubin, total 0.2 01/08/2020 10:26 AM    ALT (SGPT) 18 01/08/2020 10:26 AM    Alk. phosphatase 81 01/08/2020 10:26 AM    Protein, total 5.7 (L) 01/08/2020 10:26 AM    Albumin 2.8 (L) 01/08/2020 10:26 AM    Globulin 2.9 01/08/2020 10:26 AM     Lab Results   Component Value Date/Time    Iron % saturation 22 04/22/2020 06:10 AM    TIBC 420 04/22/2020 06:10 AM    Ferritin 5 (L) 04/22/2020 06:10 AM     Lab Results   Component Value Date/Time    D-dimer 0.29 04/21/2020 06:55 PM       Records reviewed and summarized above.   Pathology report(s) reviewed above. Radiology report(s) reviewed above. CT neck 12/3/19: IMPRESSION:   1. Extensive right cervical lymphadenopathy as previously described, with a  very slight interval decrease in size but continued heterogeneous enhancement  consistent with necrosis. There is no abscess formation or liquefaction,  however. 2. Stranding of surrounding fat which may be related to treatment and or venous  congestion. 3. Intraluminal thrombus in the right internal jugular vein, new since the prior  study    Assessment:   1) R tonsil SCC  Per discussion with Dr. Rimma Flower the tonsillar mass may be involving the pharyngeal wall  CT suggests multiple ipsilateral nodes , atleast one ~3  cm and none > 6 cm  PET CT shows no distant mets  HPV positive    Clinically at least T2N1- Though could be T3N1 - HPV postive- stage I-II    Completed 2 cycles of Cisplatin (pt refused 3rd cycle) and completed RT on 12/26/19. PET 12 weeks later showed a CR and he is on surveillance    He had a normal exam with ENT  5/21/2020 Blood work reviewed  [address] continue surveillance as below      2) Iron Deficiency Anemia  S/p 1 unit PRBCs  GIB on Xarelto  Off AC, s/p Injecatfer 5/12/2020 and Hb is improving  Colonoscopy EGD with Dr. Clayton Cade      3) Dysphagia  Grade 1  PEG removed. Swallow test reviewed- continue Therapy and follow with RD  Nystatin for thrush    4) Right IJ thrombus  R/t port; was started on xarelto. Started 12/3/19 - stopped with GIB and port removed    5)  FH of Cancers  Should consider genetic counseling at a later date    Plan:     · RTC 3 months with cbc, cmp, TSH  · Follow with GI  · CT tomorrow- will call with results      I appreciate the opportunity to participate in Mr. Ann clayton. Signed By: Mya Avina MD      I was in the office while conducting this encounter. The patient was at his home.     Consent:  He and/or his healthcare decision maker is aware that this patient-initiated Telehealth encounter is a billable service, with coverage as determined by his insurance carrier. He is aware that he may receive a bill and has provided verbal consent to proceed: Yes    Pursuant to the emergency declaration under the [address] and the [place] [address] waiver authority and the [employer] and [employer] Act, this Virtual  Visit was conducted, with patient's (and/or legal guardian's) consent, to reduce the patient's risk of exposure to COVID-19 and provide necessary medical care. Services were provided through a video synchronous discussion virtually to substitute for in-person visit.

## 2020-05-28 NOTE — PROGRESS NOTES
Mani Vogel is a 63 y.o. 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 Have you been to the ER, urgent care clinic since your last visit? Hospitalized since your last visit? No    2. Have you seen or consulted any other health care providers outside of the [address] [place] [address] since your last visit? Include any pap smears or colon screening.  No

## 2020-05-29 ENCOUNTER — HOSPITAL ENCOUNTER (OUTPATIENT)
Dept: CT IMAGING | Age: 65
Discharge: HOME | End: 2020-05-29
Attending: OTOLARYNGOLOGY

## 2020-05-29 DIAGNOSIS — C09.8 MALIGNANT NEOPLASM OF OVERLAPPING SITES OF TONSIL (HCC): ICD-10-CM

## 2020-05-29 PROCEDURE — 74011000258 HC RX REV CODE- 258: Performed by: RADIOLOGY

## 2020-05-29 PROCEDURE — 74011636320 HC RX REV CODE- 636/320: Performed by: RADIOLOGY

## 2020-05-29 PROCEDURE — 70491 CT SOFT TISSUE NECK W/DYE: CPT

## 2020-05-29 RX ORDER — SODIUM CHLORIDE 0.9 % (FLUSH) 0.9 %
10 SYRINGE (ML) INJECTION
Status: COMPLETED | OUTPATIENT
Start: 2020-05-29 | End: 2020-05-29

## 2020-05-29 RX ADMIN — SODIUM CHLORIDE 100 ML: 900 INJECTION, SOLUTION INTRAVENOUS at 11:12

## 2020-05-29 RX ADMIN — Medication 10 ML: at 11:12

## 2020-05-29 RX ADMIN — IOPAMIDOL 100 ML: 612 INJECTION, SOLUTION INTRAVENOUS at 11:12

## 2020-06-01 ENCOUNTER — TELEPHONE (OUTPATIENT)
Dept: ONCOLOGY | Age: 65
End: 2020-06-01

## 2020-06-01 NOTE — TELEPHONE ENCOUNTER
[facility] notes from visit with SLP on 5/22/20.

## 2020-06-09 DIAGNOSIS — B37.0 ORAL THRUSH: ICD-10-CM

## 2020-06-09 DIAGNOSIS — C76.0 HEAD AND NECK CANCER (HCC): Primary | ICD-10-CM

## 2020-06-09 RX ORDER — NYSTATIN 100000 [USP'U]/ML
500000 SUSPENSION ORAL 4 TIMES DAILY
Qty: 480 ML | Refills: 1 | Status: SHIPPED | OUTPATIENT
Start: 2020-06-09 | End: 2020-11-16

## 2020-06-11 ENCOUNTER — HOSPITAL ENCOUNTER (OUTPATIENT)
Dept: INFUSION THERAPY | Age: 65
Discharge: HOME | End: 2020-06-11
Payer: COMMERCIAL

## 2020-06-11 VITALS
DIASTOLIC BLOOD PRESSURE: 73 MMHG | TEMPERATURE: 97.8 F | HEART RATE: 84 BPM | SYSTOLIC BLOOD PRESSURE: 110 MMHG | RESPIRATION RATE: 18 BRPM

## 2020-06-11 DIAGNOSIS — K92.2 GASTROINTESTINAL HEMORRHAGE, UNSPECIFIED GASTROINTESTINAL HEMORRHAGE TYPE: Primary | ICD-10-CM

## 2020-06-11 DIAGNOSIS — C09.9 TONSIL CANCER (HCC): ICD-10-CM

## 2020-06-11 LAB
BASOPHILS # BLD: 0 K/UL (ref 0–0.1)
BASOPHILS NFR BLD: 0 % (ref 0–1)
DIFFERENTIAL METHOD BLD: ABNORMAL
EOSINOPHIL # BLD: 0.5 K/UL (ref 0–0.4)
EOSINOPHIL NFR BLD: 11 % (ref 0–7)
HCT VFR BLD AUTO: 35.5 % (ref 36.6–50.3)
HGB BLD-MCNC: 11.2 G/DL (ref 12.1–17)
IMM GRANULOCYTES # BLD AUTO: 0 K/UL (ref 0–0.04)
IMM GRANULOCYTES NFR BLD AUTO: 0 % (ref 0–0.5)
LYMPHOCYTES # BLD: 1.1 K/UL (ref 0.8–3.5)
LYMPHOCYTES NFR BLD: 24 % (ref 12–49)
MCH RBC QN AUTO: 22.1 PG (ref 26–34)
MCHC RBC AUTO-ENTMCNC: 31.5 G/DL (ref 30–36.5)
MCV RBC AUTO: 70 FL (ref 80–99)
MONOCYTES # BLD: 0.5 K/UL (ref 0–1)
MONOCYTES NFR BLD: 11 % (ref 5–13)
NEUTS SEG # BLD: 2.4 K/UL (ref 1.8–8)
NEUTS SEG NFR BLD: 54 % (ref 32–75)
NRBC # BLD: 0 K/UL (ref 0–0.01)
NRBC BLD-RTO: 0 PER 100 WBC
PLATELET # BLD AUTO: 210 K/UL (ref 150–400)
RBC # BLD AUTO: 5.07 M/UL (ref 4.1–5.7)
RBC MORPH BLD: ABNORMAL
WBC # BLD AUTO: 4.5 K/UL (ref 4.1–11.1)

## 2020-06-11 PROCEDURE — 36415 COLL VENOUS BLD VENIPUNCTURE: CPT

## 2020-06-11 PROCEDURE — 85025 COMPLETE CBC W/AUTO DIFF WBC: CPT

## 2020-06-11 NOTE — PROGRESS NOTES
Outpatient Infusion Center Short Visit Progress Note    8212 Pt admit to [facility] for labs/hydration/possible transfusion ambulatory in stable condition. Assessment completed. No new concerns voiced. Pt declines today's hydration; will not stay for lab results d/t time constraint. Re-assured pt that he will receive a call if transfusion is needed. Patient Vitals for the past 12 hrs:   Temp Pulse Resp BP   06/11/20 1536 97.8 °F (36.6 °C) 84 18 110/73       1545 Pt aware to contact MD office for further [facility] appointments. Please review pending lab results in [address] [place] [address].

## 2020-08-20 ENCOUNTER — VIRTUAL VISIT (OUTPATIENT)
Dept: ENDOCRINOLOGY | Age: 65
End: 2020-08-20
Payer: COMMERCIAL

## 2020-08-20 ENCOUNTER — TELEPHONE (OUTPATIENT)
Dept: ENDOCRINOLOGY | Age: 65
End: 2020-08-20

## 2020-08-20 DIAGNOSIS — E03.9 ACQUIRED HYPOTHYROIDISM: Primary | ICD-10-CM

## 2020-08-20 PROCEDURE — 99244 OFF/OP CNSLTJ NEW/EST MOD 40: CPT | Performed by: INTERNAL MEDICINE

## 2020-08-20 RX ORDER — PREDNISOLONE ACETATE 10 MG/ML
SUSPENSION/ DROPS OPHTHALMIC
COMMUNITY
Start: 2019-01-12 | End: 2020-11-16

## 2020-08-20 RX ORDER — OMEPRAZOLE 20 MG/1
CAPSULE, DELAYED RELEASE ORAL
COMMUNITY
Start: 2019-01-06

## 2020-08-20 RX ORDER — HYDROCODONE BITARTRATE AND ACETAMINOPHEN 5; 325 MG/1; MG/1
1-2 TABLET ORAL
Status: ON HOLD | COMMUNITY
Start: 2019-01-25 | End: 2022-09-22

## 2020-08-20 RX ORDER — PREDNISONE 20 MG/1
TABLET ORAL
COMMUNITY
Start: 2019-01-25 | End: 2020-11-16

## 2020-08-20 NOTE — PROGRESS NOTES
Chief Complaint   Patient presents with   [facility] New Patient     Doxy: [phone]    Thyroid Problem    Other     Pharmacy; [facility]            **THIS IS A VIRTUAL VISIT VIA A VIDEO ENCOUNTER. PATIENT AGREED TO HAVE THEIR CARE DELIVERED OVER VIDEO IN PLACE OF THEIR REGULARLY SCHEDULED OFFICE VISIT**      History of Present Illness: Astrid Jorge is a 63 y.o. male who I was asked to see in consult by Dr. Sandi Beatty for evaluation of thyroid function. Will request records from Dr. Sandi Beatty. Pt was diagnosed with SCC of the right Tonsil in August 2019, he never had resection as it was felt to be to extensive. He did complete Chemotherapy and XRT between September and December 2019. He had 35 rounds or XRT. His most recent PET Scan in March 2020 did not show any evidence of residual cancer or apparent foci of abnormal hypermetabolism. Interval resolution of the right tonsillar abnormal activity and of the right lymph node activity. Because of the XRT and chemotherapy there is concern about possible damage to the thyroid gland. Pt has no known prior hx of thyroid issues. No known family hx of thyroid issues. He has since the therapy, he does get issues of dysphagia and chocking issues. \"Some days are better than others\". He notes he does get thrush on occasion and he notes that he has difficulty with swallowing pills. \"When I take pills, it comes back up\". He notes that if he can crush his pill, he is able to take it. Since October 2019 he has not been able to take any oral medications. He has follow up with Dr. Lei Turcios of [facility] tomorrow. Pt notes that he has also developed issues of anemia an has required PRBC in the past.  He denies issues of heat or cold intolerance, CP, palpitations, tremors. He notes he does get occasional issues of constipation. Because of his swallowing issues he has to \"supplement his diet with ensure 1400 calories per day because I am not eating much\".     Pt has hx of emphysema/COPD    Past Medical History:   Diagnosis Date    Cancer ([facility] [address]     Throat    HPV in male     HTN (hypertension)      Past Surgical History:   Procedure Laterality Date    IR INSERT GASTROSTOMY TUBE PERC  10/10/2019    IR INSERT TUNL CVC W PORT OVER 5 YEARS  10/10/2019    IR REMOVE TUNL CVAD W PORT/PUMP  4/1/2020    IR REPLACE GASTRO/CECOSTOMY TUBE PERC  1/8/2020     Current Outpatient Medications   Medication Sig    prednisoLONE acetate (PRED FORTE) 1 % ophthalmic suspension     saliva substitution (BIOTENE DRY MOUTH ORAL RINSE) mwsh mouthwash 15 mL by Mucous Membrane route as needed.  scopolamine (TRANSDERM-SCOP) 1 mg over 3 days pt3d 1 Patch by TransDERmal route every seventy-two (72) hours.  nut tx, lact-reduced, iron (BOOST VHC) 0.09-2.25 gram-kcal/mL liqd 6 Cans by Per G Tube route daily.  aluminum-magnesium hydroxide 200-200 mg/5 mL susp 5 mL, diphenhydrAMINE 12.5 mg/5 mL liqd 12.5 mg, lidocaine 2 % soln 5 mL 5 mL by Swish and Spit route two (2) times a day. Maalox  Lidocaine 2% viscous   Diphenhydramine oral solution     Pharmacy to mix equal portions of ingredients to a total volume as indicated in the dispense amount. Swish&spit 5mL (1 tsp) 4 times daily as needed    predniSONE (DELTASONE) 20 mg tablet Take 2 tabs by mouth in the morning with food and 1 tab at lunch with food.  omeprazole (PRILOSEC) 20 mg capsule     HYDROcodone-acetaminophen (NORCO) 5-325 mg per tablet Take 1-2 Tabs by mouth every six (6) hours as needed.  nystatin (MYCOSTATIN) 100,000 unit/mL suspension Take 5 mL by mouth four (4) times daily. swish and swallow    baclofen (LIORESAL) 10 mg tablet Take 1 Tab by mouth three (3) times daily as needed for Other (Hiccups).  pantoprazole (PROTONIX) 40 mg tablet Take 1 Tab by mouth daily.  cyanocobalamin 1,000 mcg tablet Take 1,000 mcg by mouth daily.  methylPREDNISolone (MEDROL) 8 mg tablet Take 5 Tabs by mouth See Admin Instructions.  Take 5 tabs by mouth (40mg) on days 2&3 of chemotherapy    ondansetron (ZOFRAN ODT) 8 mg disintegrating tablet Take 1 Tab by mouth every eight (8) hours as needed for Nausea.  hydroCHLOROthiazide (HYDRODIURIL) 12.5 mg tablet Take 12.5 mg by mouth daily.  atorvastatin (LIPITOR) 20 mg tablet Take  by mouth daily.  multivitamin (ONE A DAY) tablet Take 1 Tab by mouth daily. No current facility-administered medications for this visit. No Known Allergies  History reviewed. No pertinent family history. Social History     Socioeconomic History    Marital status:      Spouse name: Not on file    Number of children: Not on file    Years of education: Not on file    Highest education level: Not on file   Occupational History    Not on file   Social Needs    Financial resource strain: Not on file    Food insecurity     Worry: Not on file     Inability: Not on file    Transportation needs     Medical: Not on file     Non-medical: Not on file   Tobacco Use    Smoking status: Former Smoker    Smokeless tobacco: Never Used   Substance and Sexual Activity    Alcohol use:  Yes     Alcohol/week: 2.0 standard drinks     Types: 2 Cans of beer per week     Comment: nightly     Drug use: Never    Sexual activity: Not on file   Lifestyle    Physical activity     Days per week: Not on file     Minutes per session: Not on file    Stress: Not on file   Relationships    Social connections     Talks on phone: Not on file     Gets together: Not on file     Attends Bahai service: Not on file     Active member of club or organization: Not on file     Attends meetings of clubs or organizations: Not on file     Relationship status: Not on file    Intimate partner violence     Fear of current or ex partner: Not on file     Emotionally abused: Not on file     Physically abused: Not on file     Forced sexual activity: Not on file   Other Topics Concern    Not on file   Social History Narrative    Not on file Review of Systems:  As noted in HPI  Physical Examination:  There were no vitals taken for this visit. - GENERAL: NCAT, Appears well nourished   - EYES: EOMI, non-icteric, no proptosis   - Ear/Nose/Throat: NCAT, no visible inflammation or masses   - CARDIOVASCULAR: no cyanosis, no visible JVD   - RESPIRATORY: respiratory effort normal without any distress or labored breathing   - MUSCULOSKELETAL: Normal ROM of neck and upper extremities observed   - SKIN: No rash on face   - NEUROLOGIC:  No facial asymmetry (Cranial nerve 7 motor function), No gaze palsy   - PSYCHIATRIC: Normal affect, Normal insight and judgement   -     Data Reviewed:   EXAM: CT NECK SOFT TISSUE W CONT     INDICATION: Malignant neoplasm of tonsil     COMPARISON: 2/18/2020.     CONTRAST: 100 mL of Isovue-300.     TECHNIQUE: Multislice helical CT was performed from the mid calvarium to the  aortic arch during uneventful rapid bolus intravenous contrast administration. Contiguous 2.5 mm axial images were reconstructed and lung and soft tissue  windows were generated. Coronal and sagittal reformations were generated. CT  dose reduction was achieved through use of a standardized protocol tailored for  this examination and automatic exposure control for dose modulation.      FINDINGS:     The adenopathy in the neck has resolved.     No discrete tonsillar mass is identified.      There is residual edema in the oral pharynx/hypopharynx with narrowing of the  supraglottic airway measuring 6 mm AP. Lenore Maldonado There is residual edema in the right  neck around the right submandibular gland and right carotid jugular vein. .     The carotid and jugular vessels enhance normally.         No abnormalities are identified in the visualized portions of the brain or  orbits.      There is mucosal thickening ethmoidal air cells and sphenoid sinus and slight  mucosal thickening frontal sinus.     There are changes of emphysema.  There is pleural thickening in the apices which  is stable     IMPRESSION  IMPRESSION:   1. No adenopathy is identified in the neck. 2. There is residual low-density edema in the oropharynx/hypopharynx extending  into the right neck without evidence of discrete mass. The supraglottic airway  is narrowed measuring 6 mm AP. 23X     Assessment/Plan:   1. Acquired hypothyroidism    1) We discussed at length thyroid function and structure and how the XRT could have caused damage to the thyroid gland's ability to produce thyroid hormone. Pt is clinically euthyroid. Will start by checking TSH, FT4, TT3 and thyroid Ab panel. Once these results are back we can discuss starting him on a thyroid replacement regimen, if needed. Pt noted that he could swallow a pill, if it can be crushed up so if he does need thyroid replacement, it will need to be with a crushable pill or in a liquid form. Pt voices understanding and agreement with the plan. Copy sent to:  Drs. Shannon Cabot and Oj Limon

## 2020-08-20 NOTE — TELEPHONE ENCOUNTER
Hi Cade,     Per Dr José Miguel Zamora,    Please call new pt and reschedule him for  1:30 this afternoon.

## 2020-08-21 ENCOUNTER — OFFICE VISIT (OUTPATIENT)
Dept: ONCOLOGY | Age: 65
End: 2020-08-21
Payer: COMMERCIAL

## 2020-08-21 VITALS
HEART RATE: 87 BPM | SYSTOLIC BLOOD PRESSURE: 121 MMHG | BODY MASS INDEX: 19.4 KG/M2 | DIASTOLIC BLOOD PRESSURE: 76 MMHG | WEIGHT: 131 LBS | TEMPERATURE: 97.1 F | OXYGEN SATURATION: 98 % | HEIGHT: 69 IN

## 2020-08-21 DIAGNOSIS — C76.0 HEAD AND NECK CANCER (HCC): Primary | ICD-10-CM

## 2020-08-21 DIAGNOSIS — D50.9 IRON DEFICIENCY ANEMIA, UNSPECIFIED IRON DEFICIENCY ANEMIA TYPE: ICD-10-CM

## 2020-08-21 PROCEDURE — 99214 OFFICE O/P EST MOD 30 MIN: CPT | Performed by: INTERNAL MEDICINE

## 2020-08-21 RX ORDER — TADALAFIL 5 MG/1
5 TABLET ORAL AS NEEDED
Qty: 30 TAB | Refills: 1 | Status: SHIPPED | OUTPATIENT
Start: 2020-08-21

## 2020-08-21 NOTE — PROGRESS NOTES
Kapil Madera is a 63 y.o. 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 Have you been to the ER, urgent care clinic since your last visit? Hospitalized since your last visit? No.  2. Have you seen or consulted any other health care providers outside of the [address] [place] [address] since your last visit? Include any pap smears or colon screening.  No.

## 2020-08-21 NOTE — PROGRESS NOTES
[facility] [place] [address] [address], [address], [address] [place] [address] Parul Kendall Media: [phone]  F: [phone]    Reason for Visit:   Lance Simmons is a 63 y.o. 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 Size is measuring up to 2.5 cm in short axis. Right internal jugular vein is nearly fully compressed, no contralateral adenopathy seen. · 9/5/2019-ENT exam showed very inflamed tonsil on the right side with right Epley and surface, reportedly invading the pharyngeal wall-biopsy of the right tonsil showed invasive nonkeratinizing squamous cell carcinoma moderately differentiated  · 9/2019: PET CT Right tonsillar mass with hypermetabolic right cervical lymphadenopathy  · 11/6/19: cycle 1 cisplatin + RT  · 12/27/19: radiation completed   · 3/2020: PET with CR  · 5/2020: CT JEREMI    History of Present Illness:   Patient is a 63 y.o. male seen for SCC of the R tonsil    3 months ago he noted a small R neck swelling. Saw Dr. Osorio Last. Tried antibiotics for 10 days. Returned 8/9/19 and had an USG that showed adenopathy and then referred to Dr. Igor Machado. This led to above mentioned diagnosis and treatments and he has now been on surveillance. He did have anemia of blood loss on Xarelto in April 2020 when Xarelto was discontinued. He received Injectafer x 2. On surveillance  He constantly wants to clear his throat. He has clear sputum, he has no SOB, NO bleeding, he is working again. He has no fevers, chills, fatigue.  He has not the greatest appetite, he is still supplementing with boost. No pain, sees Dr. Igor Machado and saw her last week- exam was normal. Sees Dr. Gamez  with endocrinologist.     He never did have a colonoscopy    Sister had stage IV endometrial cancer in her 46s  Another sister  of ovarian cancer in her 62s     [address] and [address] reviewed above    [address]   Reviewed under HPI      Reviewed under HPI    Current Outpatient Medications   Medication Sig    predniSONE (DELTASONE) 20 mg tablet Take 2 tabs by mouth in the morning with food and 1 tab at lunch with food.  prednisoLONE acetate (PRED FORTE) 1 % ophthalmic suspension     omeprazole (PRILOSEC) 20 mg capsule     nystatin (MYCOSTATIN) 100,000 unit/mL suspension Take 5 mL by mouth four (4) times daily. swish and swallow    pantoprazole (PROTONIX) 40 mg tablet Take 1 Tab by mouth daily.  saliva substitution (BIOTENE DRY MOUTH ORAL RINSE) mwsh mouthwash 15 mL by Mucous Membrane route as needed.  cyanocobalamin 1,000 mcg tablet Take 1,000 mcg by mouth daily.  scopolamine (TRANSDERM-SCOP) 1 mg over 3 days pt3d 1 Patch by TransDERmal route every seventy-two (72) hours.  methylPREDNISolone (MEDROL) 8 mg tablet Take 5 Tabs by mouth See Admin Instructions. Take 5 tabs by mouth (40mg) on days 2&3 of chemotherapy    nut tx, lact-reduced, iron (BOOST VHC) 0.09-2.25 gram-kcal/mL liqd 6 Cans by Per G Tube route daily.  aluminum-magnesium hydroxide 200-200 mg/5 mL susp 5 mL, diphenhydrAMINE 12.5 mg/5 mL liqd 12.5 mg, lidocaine 2 % soln 5 mL 5 mL by Swish and Spit route two (2) times a day. Maalox  Lidocaine 2% viscous   Diphenhydramine oral solution     Pharmacy to mix equal portions of ingredients to a total volume as indicated in the dispense amount. Swish&spit 5mL (1 tsp) 4 times daily as needed    hydroCHLOROthiazide (HYDRODIURIL) 12.5 mg tablet Take 12.5 mg by mouth daily.  atorvastatin (LIPITOR) 20 mg tablet Take  by mouth daily.  multivitamin (ONE A DAY) tablet Take 1 Tab by mouth daily.  HYDROcodone-acetaminophen (NORCO) 5-325 mg per tablet Take 1-2 Tabs by mouth every six (6) hours as needed.     baclofen (LIORESAL) 10 mg tablet Take 1 Tab by mouth three (3) times daily as needed for Other (Hiccups).  ondansetron (ZOFRAN ODT) 8 mg disintegrating tablet Take 1 Tab by mouth every eight (8) hours as needed for Nausea. No current facility-administered medications for this visit. No Known Allergies     Review of Systems: A complete review of systems was obtained, negative except as described above. Physical Exam:     Vitals reviewed     Constitutional: Appears well-developed and well-nourished in no apparent distress   Mental status: Alert and awake, Oriented to person/place/time, Able to follow commands, Voice is hoarse,  Eyes: EOM normal, Sclera normal, No visible ocular discharge  HENT: Normocephalic, atraumatic; Mouth/Throat: Moist mucous membranes, External Ears normal, R side of neck still with some scar tissue  Neck: No visualized mass, has a chest wall keloid  Pulmonary/Chest: Respiratory effort normal, No visualized signs of difficulty breathing or respiratory distress   Psychiatric: Normal affect, normal judgment/insight. No hallucinations       Results:     Lab Results   Component Value Date/Time    WBC 4.5 06/11/2020 03:39 PM    HGB 11.2 (L) 06/11/2020 03:39 PM    HCT 35.5 (L) 06/11/2020 03:39 PM    PLATELET 761 82/68/8778 03:39 PM    MCV 70.0 (L) 06/11/2020 03:39 PM    ABS. NEUTROPHILS 2.4 06/11/2020 03:39 PM     Lab Results   Component Value Date/Time    Sodium 138 04/22/2020 06:00 AM    Potassium 4.1 04/22/2020 06:00 AM    Chloride 108 04/22/2020 06:00 AM    CO2 25 04/22/2020 06:00 AM    Glucose 92 04/22/2020 06:00 AM    BUN 14 04/22/2020 06:00 AM    Creatinine 0.87 04/22/2020 06:00 AM    GFR est AA >60 04/22/2020 06:00 AM    GFR est non-AA >60 04/22/2020 06:00 AM    Calcium 9.6 04/22/2020 06:00 AM     Lab Results   Component Value Date/Time    Bilirubin, total 0.2 01/08/2020 10:26 AM    ALT (SGPT) 18 01/08/2020 10:26 AM    Alk.  phosphatase 81 01/08/2020 10:26 AM    Protein, total 5.7 (L) 01/08/2020 10:26 AM    Albumin 2.8 (L) 01/08/2020 10:26 AM    Globulin 2.9 01/08/2020 10:26 AM     Lab Results   Component Value Date/Time    Iron % saturation 22 04/22/2020 06:10 AM    TIBC 420 04/22/2020 06:10 AM    Ferritin 5 (L) 04/22/2020 06:10 AM     Lab Results   Component Value Date/Time    D-dimer 0.29 04/21/2020 06:55 PM       Records reviewed and summarized above. Pathology report(s) reviewed above. Radiology report(s) reviewed above. CT neck 12/3/19: IMPRESSION:   1. Extensive right cervical lymphadenopathy as previously described, with a  very slight interval decrease in size but continued heterogeneous enhancement  consistent with necrosis. There is no abscess formation or liquefaction,  however. 2. Stranding of surrounding fat which may be related to treatment and or venous  congestion. 3. Intraluminal thrombus in the right internal jugular vein, new since the prior  study    Assessment:   1) R tonsil SCC  Per discussion with Dr. Malka Mae the tonsillar mass may be involving the pharyngeal wall  CT suggests multiple ipsilateral nodes , atleast one ~3  cm and none > 6 cm  PET CT shows no distant mets  HPV positive    Clinically at least T2N1- Though could be T3N1 - HPV postive- stage I-II    Completed 2 cycles of Cisplatin (pt refused 3rd cycle) and completed RT on 12/26/19. PET 12 weeks later showed a CR and he is on surveillance    He had a normal exam with ENT in Aug 2020  Doing well with no recurrence       2) Iron Deficiency Anemia  S/p 1 unit PRBCs  GIB on Xarelto  Off AC, s/p Injecatfer 5/12/2020 and Hb is improving  Colonoscopy EGD with Dr. Emmett Guzman- gave him the name and number to call       3) Dysphagia  Grade 1      4)  FH of Cancers  Should consider genetic counseling at a later date    Plan:     · RTC 3-4 months with cbc, cmp, TSH  · Follow with GI  · Follow with Dr. Malka Mae      I appreciate the opportunity to participate in Mr. Demond clayton.     Signed By: Moris Turcios MD

## 2020-08-22 LAB
T3 SERPL-MCNC: 111 NG/DL (ref 71–180)
T4 FREE SERPL-MCNC: 0.96 NG/DL (ref 0.82–1.77)
THYROGLOB AB SERPL-ACNC: <1 IU/ML (ref 0–0.9)
THYROPEROXIDASE AB SERPL-ACNC: <9 IU/ML (ref 0–34)
TSH SERPL DL<=0.005 MIU/L-ACNC: 8.28 UIU/ML (ref 0.45–4.5)

## 2020-08-24 RX ORDER — LEVOTHYROXINE SODIUM 75 UG/1
75 TABLET ORAL
Qty: 30 TAB | Refills: 3 | Status: SHIPPED | OUTPATIENT
Start: 2020-08-24 | End: 2021-01-04

## 2020-08-28 DIAGNOSIS — C76.0 HEAD AND NECK CANCER (HCC): ICD-10-CM

## 2020-09-03 ENCOUNTER — HOSPITAL ENCOUNTER (OUTPATIENT)
Dept: GENERAL RADIOLOGY | Age: 65
Discharge: HOME | End: 2020-09-03
Payer: COMMERCIAL

## 2020-09-03 DIAGNOSIS — R07.82 INTERCOSTAL PAIN: ICD-10-CM

## 2020-09-03 PROCEDURE — 71046 X-RAY EXAM CHEST 2 VIEWS: CPT | Performed by: FAMILY MEDICINE

## 2020-11-16 ENCOUNTER — VIRTUAL VISIT (OUTPATIENT)
Dept: ENDOCRINOLOGY | Age: 65
End: 2020-11-16
Payer: COMMERCIAL

## 2020-11-16 DIAGNOSIS — E03.9 ACQUIRED HYPOTHYROIDISM: Primary | ICD-10-CM

## 2020-11-16 PROCEDURE — 99213 OFFICE O/P EST LOW 20 MIN: CPT | Performed by: INTERNAL MEDICINE

## 2020-11-16 RX ORDER — DICLOFENAC SODIUM 75 MG/1
75 TABLET, DELAYED RELEASE ORAL 2 TIMES DAILY
COMMUNITY
Start: 2020-10-16

## 2020-11-16 RX ORDER — METHOCARBAMOL 750 MG/1
750 TABLET, FILM COATED ORAL
Status: ON HOLD | COMMUNITY
Start: 2020-10-16 | End: 2022-09-22

## 2020-11-16 NOTE — PROGRESS NOTES
Chief Complaint   Patient presents with    Thyroid Problem     pcp and pharmacy verified            **THIS IS A VIRTUAL VISIT VIA A VIDEO ENCOUNTER. PATIENT AGREED TO HAVE THEIR CARE DELIVERED OVER VIDEO IN PLACE OF THEIR REGULARLY SCHEDULED OFFICE VISIT**      History of Present Illness: Pancho Smiley is a 63 y.o. male here for follow up of hypothyroidism. Pt was diagnosed with SCC of the right Tonsil in August 2019, he never had resection as it was felt to be to extensive. He did complete Chemotherapy and XRT between September and December 2019. He had 35 rounds or XRT. His most recent PET Scan in March 2020 did not show any evidence of residual cancer or apparent foci of abnormal hypermetabolism. Interval resolution of the right tonsillar abnormal activity and of the right lymph node activity. Because of the XRT and chemotherapy he did develop hypothyroidism. At our initial visit in August 2020 his TSH was 8.28 so I started him on LT4 75mcg daily. He had an US of the neck in May 2020 which did not show evidence of thyroid nodules or masses. Pt notes he saw Dr. Mango Darden who told him that he had swelling and erythema in his throat and put him on a course of prednisone, which helped. Pt notes that he is still taking the LT4 75mcg, with his HTN medication and HLD medication, first thing in the morning. He denies issues of CP, SOB, palpitations, tremors, diarrhea, constipation, heat or cold intolerance, syncope or pre-syncope. He notes he will have occasional pain with swallowing, but not chocking issues. there is concern about possible damage to the thyroid gland. Pt has no known prior hx of thyroid issues. No known family hx of thyroid issues. He has follow up with Dr. Bri Hansen of H/O on 11/18/20. Current Outpatient Medications   Medication Sig    methocarbamoL (ROBAXIN) 750 mg tablet Take 750 mg by mouth every eight (8) hours as needed.     diclofenac EC (VOLTAREN) 75 mg EC tablet Take 75 mg by mouth two (2) times a day.  levothyroxine (SYNTHROID) 75 mcg tablet Take 1 Tab by mouth Daily (before breakfast).  tadalafiL (Cialis) 5 mg tablet Take 1 Tab by mouth as needed for Erectile Dysfunction.  omeprazole (PRILOSEC) 20 mg capsule     HYDROcodone-acetaminophen (NORCO) 5-325 mg per tablet Take 1-2 Tabs by mouth every six (6) hours as needed.  methylPREDNISolone (MEDROL) 8 mg tablet Take 5 Tabs by mouth See Admin Instructions. Take 5 tabs by mouth (40mg) on days 2&3 of chemotherapy    nut tx, lact-reduced, iron (BOOST VHC) 0.09-2.25 gram-kcal/mL liqd 6 Cans by Per G Tube route daily. (Patient taking differently: 6 Cans by Per G Tube route daily. ENSURE now)    hydroCHLOROthiazide (HYDRODIURIL) 12.5 mg tablet Take 12.5 mg by mouth daily.  atorvastatin (LIPITOR) 20 mg tablet Take  by mouth daily.  multivitamin (ONE A DAY) tablet Take 1 Tab by mouth daily. No current facility-administered medications for this visit. No Known Allergies  Review of Systems:  - Cardiovascular: no chest pain  - Neurological: no tremors  - Integumentary: skin is normal    Physical Examination:  There were no vitals taken for this visit.   - GENERAL: NCAT, Appears well nourished   - EYES: EOMI, non-icteric, no proptosis   - Ear/Nose/Throat: NCAT, no visible inflammation or masses   - CARDIOVASCULAR: no cyanosis, no visible JVD   - RESPIRATORY: respiratory effort normal without any distress or labored breathing   - MUSCULOSKELETAL: Normal ROM of neck and upper extremities observed   - SKIN: No rash on face   - NEUROLOGIC:  No facial asymmetry (Cranial nerve 7 motor function), No gaze palsy   - PSYCHIATRIC: Normal affect, Normal insight and judgement   -     Data Reviewed:   EXAM: CT NECK SOFT TISSUE W CONT     INDICATION: Malignant neoplasm of tonsil     COMPARISON: 2/18/2020.     CONTRAST: 100 mL of Isovue-300.     TECHNIQUE: Multislice helical CT was performed from the mid calvarium to the  aortic arch during uneventful rapid bolus intravenous contrast administration. Contiguous 2.5 mm axial images were reconstructed and lung and soft tissue  windows were generated. Coronal and sagittal reformations were generated. CT  dose reduction was achieved through use of a standardized protocol tailored for  this examination and automatic exposure control for dose modulation.      FINDINGS:     The adenopathy in the neck has resolved.     No discrete tonsillar mass is identified.      There is residual edema in the oral pharynx/hypopharynx with narrowing of the  supraglottic airway measuring 6 mm AP. Shadia Al There is residual edema in the right  neck around the right submandibular gland and right carotid jugular vein. .     The carotid and jugular vessels enhance normally.         No abnormalities are identified in the visualized portions of the brain or  orbits.      There is mucosal thickening ethmoidal air cells and sphenoid sinus and slight  mucosal thickening frontal sinus.     There are changes of emphysema. There is pleural thickening in the apices which  is stable     IMPRESSION  IMPRESSION:   1. No adenopathy is identified in the neck. 2. There is residual low-density edema in the oropharynx/hypopharynx extending  into the right neck without evidence of discrete mass. The supraglottic airway  is narrowed measuring 6 mm AP. 23X    Assessment/Plan:   1) Hypothyroidism > Pt is clinically euthyroid on the LT4 75mcg daily. Will order TFTs and adjust his dose as needed. RTC based on the above results    Pt voices understanding and agreement with the plan. Pain noted and pt was recommended to call his PCP for further evaluation and treatment, as needed      Copy sent to:  Cindy Kat and Darrell Kim

## 2020-11-18 ENCOUNTER — OFFICE VISIT (OUTPATIENT)
Dept: ONCOLOGY | Age: 65
End: 2020-11-18
Payer: COMMERCIAL

## 2020-11-18 VITALS
SYSTOLIC BLOOD PRESSURE: 109 MMHG | HEART RATE: 94 BPM | DIASTOLIC BLOOD PRESSURE: 76 MMHG | BODY MASS INDEX: 19.67 KG/M2 | TEMPERATURE: 96.5 F | OXYGEN SATURATION: 99 % | HEIGHT: 69 IN | WEIGHT: 132.8 LBS

## 2020-11-18 DIAGNOSIS — D50.9 IRON DEFICIENCY ANEMIA, UNSPECIFIED IRON DEFICIENCY ANEMIA TYPE: ICD-10-CM

## 2020-11-18 DIAGNOSIS — C76.0 HEAD AND NECK CANCER (HCC): Primary | ICD-10-CM

## 2020-11-18 DIAGNOSIS — C09.9 TONSIL CANCER (HCC): ICD-10-CM

## 2020-11-18 PROCEDURE — 99213 OFFICE O/P EST LOW 20 MIN: CPT | Performed by: INTERNAL MEDICINE

## 2020-11-18 NOTE — PROGRESS NOTES
[facility] [place] [address] [address], [address]: [phone]  F: [phone]    Reason for Visit:   Jordi Price is a 63 y.o. 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 Size is measuring up to 2.5 cm in short axis. Right internal jugular vein is nearly fully compressed, no contralateral adenopathy seen. · 9/5/2019-ENT exam showed very inflamed tonsil on the right side with right Epley and surface, reportedly invading the pharyngeal wall-biopsy of the right tonsil showed invasive nonkeratinizing squamous cell carcinoma moderately differentiated  · 9/2019: PET CT Right tonsillar mass with hypermetabolic right cervical lymphadenopathy  · 11/6/19: cycle 1 cisplatin + RT  · 12/27/19: radiation completed   · 3/2020: PET with CR  · 5/2020: CT JEREMI    History of Present Illness:   Patient is a 63 y.o. male seen for SCC of the R tonsil    3 months ago he noted a small R neck swelling. Saw Dr. Carlos Mei. Tried antibiotics for 10 days. Returned 8/9/19 and had an USG that showed adenopathy and then referred to Dr. Nika Nova. This led to above mentioned diagnosis and treatments and he has now been on surveillance. He did have anemia of blood loss on Xarelto in April 2020 when Xarelto was discontinued. He received Injectafer x 2. On surveillance. He is back working now. He is doing well. He has not been able to gain his weight back prior to his diagnosis. He eats well by mouth and takes in 4-5 ensures/day. He saw ENT about 2 weeks ago who said he still had some residual swelling so he was put on a course of steroids. He denies dysphagia. Denies nausea/vomiting or diarrhea/constipation. He is following with endocrinology and is on synthroid.  He denies SOB, CP, cough, fevers/chills, bleeding, or LE swelling. He has no pain. He never did have a colonoscopy    Sister had stage IV endometrial cancer in her 46s  Another sister  of ovarian cancer in her 62s     [address] reviewed above    [address]   Reviewed under HPI      Reviewed under HPI    Current Outpatient Medications   Medication Sig    methocarbamoL (ROBAXIN) 750 mg tablet Take 750 mg by mouth every eight (8) hours as needed.  diclofenac EC (VOLTAREN) 75 mg EC tablet Take 75 mg by mouth two (2) times a day.  levothyroxine (SYNTHROID) 75 mcg tablet Take 1 Tab by mouth Daily (before breakfast).  tadalafiL (Cialis) 5 mg tablet Take 1 Tab by mouth as needed for Erectile Dysfunction.  omeprazole (PRILOSEC) 20 mg capsule     HYDROcodone-acetaminophen (NORCO) 5-325 mg per tablet Take 1-2 Tabs by mouth every six (6) hours as needed.  methylPREDNISolone (MEDROL) 8 mg tablet Take 5 Tabs by mouth See Admin Instructions. Take 5 tabs by mouth (40mg) on days 2&3 of chemotherapy    nut tx, lact-reduced, iron (BOOST VHC) 0.09-2.25 gram-kcal/mL liqd 6 Cans by Per G Tube route daily. (Patient taking differently: 6 Cans by Per G Tube route daily. ENSURE now)    hydroCHLOROthiazide (HYDRODIURIL) 12.5 mg tablet Take 12.5 mg by mouth daily.  atorvastatin (LIPITOR) 20 mg tablet Take  by mouth daily.  multivitamin (ONE A DAY) tablet Take 1 Tab by mouth daily. No current facility-administered medications for this visit. No Known Allergies     Review of Systems: A complete review of systems was obtained, negative except as described above.     Physical Exam:     Vitals reviewed     Constitutional: Appears well-developed and well-nourished in no apparent distress   Mental status: Alert and awake, Oriented to person/place/time, Able to follow commands, Voice is hoarse,  Eyes: EOM normal, Sclera normal, No visible ocular discharge  HENT: Normocephalic, atraumatic; Mouth/Throat: Moist mucous membranes, External Ears normal, no adenopathy palpated   Neck: No visualized mass, has a chest wall keloid  Pulmonary/Chest: Respiratory effort normal, No visualized signs of difficulty breathing or respiratory distress   Psychiatric: Normal affect, normal judgment/insight. No hallucinations       Results:     Lab Results   Component Value Date/Time    WBC 4.5 06/11/2020 03:39 PM    HGB 11.2 (L) 06/11/2020 03:39 PM    HCT 35.5 (L) 06/11/2020 03:39 PM    PLATELET 214 49/18/6361 03:39 PM    MCV 70.0 (L) 06/11/2020 03:39 PM    ABS. NEUTROPHILS 2.4 06/11/2020 03:39 PM     Lab Results   Component Value Date/Time    Sodium 138 04/22/2020 06:00 AM    Potassium 4.1 04/22/2020 06:00 AM    Chloride 108 04/22/2020 06:00 AM    CO2 25 04/22/2020 06:00 AM    Glucose 92 04/22/2020 06:00 AM    BUN 14 04/22/2020 06:00 AM    Creatinine 0.87 04/22/2020 06:00 AM    GFR est AA >60 04/22/2020 06:00 AM    GFR est non-AA >60 04/22/2020 06:00 AM    Calcium 9.6 04/22/2020 06:00 AM     Lab Results   Component Value Date/Time    Bilirubin, total 0.2 01/08/2020 10:26 AM    ALT (SGPT) 18 01/08/2020 10:26 AM    Alk. phosphatase 81 01/08/2020 10:26 AM    Protein, total 5.7 (L) 01/08/2020 10:26 AM    Albumin 2.8 (L) 01/08/2020 10:26 AM    Globulin 2.9 01/08/2020 10:26 AM     Lab Results   Component Value Date/Time    Iron % saturation 22 04/22/2020 06:10 AM    TIBC 420 04/22/2020 06:10 AM    Ferritin 5 (L) 04/22/2020 06:10 AM    TSH 8.280 (H) 08/21/2020 08:47 AM     Lab Results   Component Value Date/Time    D-dimer 0.29 04/21/2020 06:55 PM       Records reviewed and summarized above. Pathology report(s) reviewed above. Radiology report(s) reviewed above. CT neck 12/3/19: IMPRESSION:   1. Extensive right cervical lymphadenopathy as previously described, with a  very slight interval decrease in size but continued heterogeneous enhancement  consistent with necrosis. There is no abscess formation or liquefaction,  however.   2. Stranding of surrounding fat which may be related to treatment and or venous  congestion. 3. Intraluminal thrombus in the right internal jugular vein, new since the prior  study    Assessment:   1) R tonsil SCC  Per discussion with Dr. Brianna Corea the tonsillar mass may be involving the pharyngeal wall  CT suggests multiple ipsilateral nodes , atleast one ~3  cm and none > 6 cm  PET CT shows no distant mets  HPV positive    Clinically at least T2N1- Though could be T3N1 - HPV postive- stage I-II    Completed 2 cycles of Cisplatin (pt refused 3rd cycle) and completed RT on 12/26/19. PET 12 weeks later showed a CR and he is on surveillance    He had a normal exam with ENT in Nov 2020 with some residual swelling  Doing well with no recurrence     2) Iron Deficiency Anemia  S/p 1 unit PRBCs  GIB on Xarelto  Off AC, s/p Injecatfer 5/12/2020 and Hb is improving  Colonoscopy EGD with Dr. Ihsan De Leon- has not done, gave him number again to set up     3) Dysphagia  Grade 1    4)  FH of Cancers  Should consider genetic counseling at a later date    Plan:     · RTC 6 months with cbc, cmp, TSH  · Follow with GI - call for appointment   · Follow with Dr. Brianna Corea  · Follow with Dr. Adilene Mejia     RTC in 6 months     I appreciate the opportunity to participate in Mr. Angie clayton. I performed a history and physical examination of the patient and discussed his management with the NPP.  I reviewed the NPP note and agree with the documented findings and plan of care    Signed By: Ash Crane MD

## 2020-11-18 NOTE — PROGRESS NOTES
Pancho Smiley is a 63 y.o. 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 Have you been to the ER, urgent care clinic since your last visit? Hospitalized since your last visit? no    2. Have you seen or consulted any other health care providers outside of the [address] [place] [address] since your last visit? Include any pap smears or colon screening.  no

## 2020-11-19 LAB
ALBUMIN SERPL-MCNC: 4.4 G/DL (ref 3.8–4.8)
ALBUMIN/GLOB SERPL: 1.7 {RATIO} (ref 1.2–2.2)
ALP SERPL-CCNC: 75 IU/L (ref 39–117)
ALT SERPL-CCNC: 24 IU/L (ref 0–44)
AST SERPL-CCNC: 17 IU/L (ref 0–40)
BASOPHILS # BLD AUTO: 0 X10E3/UL (ref 0–0.2)
BASOPHILS NFR BLD AUTO: 0 %
BILIRUB SERPL-MCNC: 0.3 MG/DL (ref 0–1.2)
BUN SERPL-MCNC: 17 MG/DL (ref 8–27)
BUN/CREAT SERPL: 23 (ref 10–24)
CALCIUM SERPL-MCNC: 9.7 MG/DL (ref 8.6–10.2)
CHLORIDE SERPL-SCNC: 100 MMOL/L (ref 96–106)
CO2 SERPL-SCNC: 30 MMOL/L (ref 20–29)
CREAT SERPL-MCNC: 0.75 MG/DL (ref 0.76–1.27)
EOSINOPHIL # BLD AUTO: 0 X10E3/UL (ref 0–0.4)
EOSINOPHIL NFR BLD AUTO: 1 %
ERYTHROCYTE [DISTWIDTH] IN BLOOD BY AUTOMATED COUNT: 15.1 % (ref 11.6–15.4)
GLOBULIN SER CALC-MCNC: 2.6 G/DL (ref 1.5–4.5)
GLUCOSE SERPL-MCNC: 126 MG/DL (ref 65–99)
HCT VFR BLD AUTO: 38.2 % (ref 37.5–51)
HGB BLD-MCNC: 12.1 G/DL (ref 13–17.7)
IMM GRANULOCYTES # BLD AUTO: 0 X10E3/UL (ref 0–0.1)
IMM GRANULOCYTES NFR BLD AUTO: 1 %
LYMPHOCYTES # BLD AUTO: 1 X10E3/UL (ref 0.7–3.1)
LYMPHOCYTES NFR BLD AUTO: 19 %
MCH RBC QN AUTO: 24.8 PG (ref 26.6–33)
MCHC RBC AUTO-ENTMCNC: 31.7 G/DL (ref 31.5–35.7)
MCV RBC AUTO: 78 FL (ref 79–97)
MONOCYTES # BLD AUTO: 0.5 X10E3/UL (ref 0.1–0.9)
MONOCYTES NFR BLD AUTO: 10 %
NEUTROPHILS # BLD AUTO: 3.7 X10E3/UL (ref 1.4–7)
NEUTROPHILS NFR BLD AUTO: 69 %
PLATELET # BLD AUTO: 244 X10E3/UL (ref 150–450)
POTASSIUM SERPL-SCNC: 4.2 MMOL/L (ref 3.5–5.2)
PROT SERPL-MCNC: 7 G/DL (ref 6–8.5)
RBC # BLD AUTO: 4.88 X10E6/UL (ref 4.14–5.8)
SODIUM SERPL-SCNC: 141 MMOL/L (ref 134–144)
T4 FREE SERPL-MCNC: 1.47 NG/DL (ref 0.82–1.77)
TSH SERPL DL<=0.005 MIU/L-ACNC: 2.01 UIU/ML (ref 0.45–4.5)
WBC # BLD AUTO: 5.2 X10E3/UL (ref 3.4–10.8)

## 2020-11-21 DIAGNOSIS — C76.0 HEAD AND NECK CANCER (HCC): ICD-10-CM

## 2020-11-30 ENCOUNTER — TELEPHONE (OUTPATIENT)
Dept: ONCOLOGY | Age: 65
End: 2020-11-30

## 2020-11-30 DIAGNOSIS — R13.10 DYSPHAGIA, UNSPECIFIED TYPE: ICD-10-CM

## 2020-11-30 DIAGNOSIS — C09.9 TONSIL CANCER (HCC): Primary | ICD-10-CM

## 2020-11-30 RX ORDER — FLUCONAZOLE 200 MG/1
200 TABLET ORAL DAILY
Qty: 7 TAB | Refills: 0 | Status: SHIPPED | OUTPATIENT
Start: 2020-11-30 | End: 2020-12-07

## 2020-11-30 NOTE — TELEPHONE ENCOUNTER
Received call from patient regarding the following:    \"white patches\" on the mouth   Going on for about 4-5 days   No fevers or chills reported   Mouth discomfort     Requesting an Rx to sent in     Informed patient information would be sent to [facility]     Patient verbalized understanding   No new questions or concerns at this time

## 2021-01-11 ENCOUNTER — TELEPHONE (OUTPATIENT)
Dept: ONCOLOGY | Age: 66
End: 2021-01-11

## 2021-02-09 RX ORDER — LEVOTHYROXINE SODIUM 75 UG/1
TABLET ORAL
Qty: 90 TAB | Refills: 1 | Status: SHIPPED | OUTPATIENT
Start: 2021-02-09 | End: 2022-01-19 | Stop reason: SDUPTHER

## 2021-02-09 NOTE — TELEPHONE ENCOUNTER
Your Thyroid hormone levels look excellent. It looks like the Levothyroxine 75mcg every day is the correct dose for you.  Continue taking this dose of the Levothyroxine 75mcg every day.

## 2021-05-13 DIAGNOSIS — C76.0 HEAD AND NECK CANCER (HCC): Primary | ICD-10-CM

## 2021-06-04 LAB
ALBUMIN SERPL-MCNC: 4.6 G/DL (ref 3.8–4.8)
ALBUMIN/GLOB SERPL: 1.8 {RATIO} (ref 1.2–2.2)
ALP SERPL-CCNC: 83 IU/L (ref 48–121)
ALT SERPL-CCNC: 15 IU/L (ref 0–44)
AST SERPL-CCNC: 26 IU/L (ref 0–40)
BASOPHILS # BLD AUTO: 0 X10E3/UL (ref 0–0.2)
BASOPHILS NFR BLD AUTO: 1 %
BILIRUB SERPL-MCNC: 0.3 MG/DL (ref 0–1.2)
BUN SERPL-MCNC: 17 MG/DL (ref 8–27)
BUN/CREAT SERPL: 18 (ref 10–24)
CALCIUM SERPL-MCNC: 9.9 MG/DL (ref 8.6–10.2)
CHLORIDE SERPL-SCNC: 102 MMOL/L (ref 96–106)
CO2 SERPL-SCNC: 28 MMOL/L (ref 20–29)
CREAT SERPL-MCNC: 0.96 MG/DL (ref 0.76–1.27)
EOSINOPHIL # BLD AUTO: 0.3 X10E3/UL (ref 0–0.4)
EOSINOPHIL NFR BLD AUTO: 7 %
ERYTHROCYTE [DISTWIDTH] IN BLOOD BY AUTOMATED COUNT: 15.9 % (ref 11.6–15.4)
GLOBULIN SER CALC-MCNC: 2.5 G/DL (ref 1.5–4.5)
GLUCOSE SERPL-MCNC: 91 MG/DL (ref 65–99)
HCT VFR BLD AUTO: 40.1 % (ref 37.5–51)
HGB BLD-MCNC: 12.8 G/DL (ref 13–17.7)
IMM GRANULOCYTES # BLD AUTO: 0 X10E3/UL (ref 0–0.1)
IMM GRANULOCYTES NFR BLD AUTO: 0 %
LYMPHOCYTES # BLD AUTO: 1.4 X10E3/UL (ref 0.7–3.1)
LYMPHOCYTES NFR BLD AUTO: 35 %
MCH RBC QN AUTO: 23.7 PG (ref 26.6–33)
MCHC RBC AUTO-ENTMCNC: 31.9 G/DL (ref 31.5–35.7)
MCV RBC AUTO: 74 FL (ref 79–97)
MONOCYTES # BLD AUTO: 0.5 X10E3/UL (ref 0.1–0.9)
MONOCYTES NFR BLD AUTO: 13 %
NEUTROPHILS # BLD AUTO: 1.8 X10E3/UL (ref 1.4–7)
NEUTROPHILS NFR BLD AUTO: 44 %
PLATELET # BLD AUTO: 237 X10E3/UL (ref 150–450)
POTASSIUM SERPL-SCNC: 4.6 MMOL/L (ref 3.5–5.2)
PROT SERPL-MCNC: 7.1 G/DL (ref 6–8.5)
RBC # BLD AUTO: 5.41 X10E6/UL (ref 4.14–5.8)
SODIUM SERPL-SCNC: 142 MMOL/L (ref 134–144)
TSH SERPL DL<=0.005 MIU/L-ACNC: 2.73 UIU/ML (ref 0.45–4.5)
WBC # BLD AUTO: 4 X10E3/UL (ref 3.4–10.8)

## 2021-06-08 ENCOUNTER — DOCUMENTATION ONLY (OUTPATIENT)
Dept: ONCOLOGY | Age: 66
End: 2021-06-08

## 2021-06-08 ENCOUNTER — OFFICE VISIT (OUTPATIENT)
Dept: ONCOLOGY | Age: 66
End: 2021-06-08
Payer: COMMERCIAL

## 2021-06-08 VITALS
TEMPERATURE: 98.2 F | OXYGEN SATURATION: 98 % | SYSTOLIC BLOOD PRESSURE: 118 MMHG | BODY MASS INDEX: 20.08 KG/M2 | DIASTOLIC BLOOD PRESSURE: 75 MMHG | WEIGHT: 136 LBS | RESPIRATION RATE: 18 BRPM | HEART RATE: 80 BPM

## 2021-06-08 DIAGNOSIS — D50.9 IRON DEFICIENCY ANEMIA, UNSPECIFIED IRON DEFICIENCY ANEMIA TYPE: Primary | ICD-10-CM

## 2021-06-08 DIAGNOSIS — C09.9 TONSIL CANCER (HCC): ICD-10-CM

## 2021-06-08 PROCEDURE — 99214 OFFICE O/P EST MOD 30 MIN: CPT | Performed by: INTERNAL MEDICINE

## 2021-06-08 NOTE — PROGRESS NOTES
[facility] [place] [address], [address]: [phone]  F: [phone]    Reason for Visit:   Khoi Hazel is a 61 y.o. 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 Size is measuring up to 2.5 cm in short axis. Right internal jugular vein is nearly fully compressed, no contralateral adenopathy seen. · 2019-ENT exam showed very inflamed tonsil on the right side with right Epley and surface, reportedly invading the pharyngeal wall-biopsy of the right tonsil showed invasive nonkeratinizing squamous cell carcinoma moderately differentiated  · 2019: PET CT Right tonsillar mass with hypermetabolic right cervical lymphadenopathy  · 19: cycle 1 cisplatin + RT  · 19: radiation completed   · 3/2020: PET with CR  · 2020: CT JEREMI    History of Present Illness:   Patient is a 61 y.o. male seen for SCC of the R tonsil    3 months ago he noted a small R neck swelling. Saw Dr. Gerardo Azevedo. Tried antibiotics for 10 days. Returned 19 and had an USG that showed adenopathy and then referred to Dr. Brissa Boogie. This led to above mentioned diagnosis and treatments and he has now been on surveillance. He did have anemia of blood loss on Xarelto in 2020 when Xarelto was discontinued. He received Injectafer x 2. Anemia has resolved. He continues surveillance.   He missed his appointment with Dr. Brissa Boogie and will reschedule  He still has some PND, he has no new lumps, sob or bleeding    He never did have a colonoscopy    Sister had stage IV endometrial cancer in her 46s  Another sister  of ovarian cancer in her 62s     PMH and PSH reviewed abov    SH   Reviewed under HPI    [place]  Reviewed under HPI    Current Outpatient Medications Medication Sig    levothyroxine (SYNTHROID) 75 mcg tablet Take 1 tablet on an empty stomach before breakfast    diclofenac EC (VOLTAREN) 75 mg EC tablet Take 75 mg by mouth two (2) times a day.  tadalafiL (Cialis) 5 mg tablet Take 1 Tab by mouth as needed for Erectile Dysfunction.  omeprazole (PRILOSEC) 20 mg capsule     HYDROcodone-acetaminophen (NORCO) 5-325 mg per tablet Take 1-2 Tabs by mouth every six (6) hours as needed.  nut tx, lact-reduced, iron (BOOST VHC) 0.09-2.25 gram-kcal/mL liqd 6 Cans by Per G Tube route daily. (Patient taking differently: 6 Cans by Per G Tube route daily. ENSURE now)    hydroCHLOROthiazide (HYDRODIURIL) 12.5 mg tablet Take 12.5 mg by mouth daily.  atorvastatin (LIPITOR) 20 mg tablet Take  by mouth daily.  multivitamin (ONE A DAY) tablet Take 1 Tab by mouth daily.  magic mouthwash solution Magic mouth wash Maalox Lidocaine 2% viscous Diphenhydramine oral solution Pharmacy to mix equal portions of ingredients to a total volume as indicated in the dispense amount. Swish & swallow 4 times daily as needed for mouth sores    methocarbamoL (ROBAXIN) 750 mg tablet Take 750 mg by mouth every eight (8) hours as needed.  methylPREDNISolone (MEDROL) 8 mg tablet Take 5 Tabs by mouth See Admin Instructions. Take 5 tabs by mouth (40mg) on days 2&3 of chemotherapy     No current facility-administered medications for this visit. No Known Allergies     Review of Systems: A complete review of systems was obtained, negative except as described above.     Physical Exam:     Vitals reviewed     Constitutional: Appears well-developed and well-nourished in no apparent distress   Mental status: Alert and awake, Oriented to person/place/time, Able to follow commands, Voice is hoarse,  Eyes: EOM normal, Sclera normal, No visible ocular discharge  HENT: Normocephalic, atraumatic; Mouth/Throat: Moist mucous membranes, External Ears normal, no adenopathy palpated   Neck: No visualized mass, has a chest wall keloid  Pulmonary/Chest: Respiratory effort normal, No visualized signs of difficulty breathing or respiratory distress   Psychiatric: Normal affect, normal judgment/insight. No hallucinations       Results:     Lab Results   Component Value Date/Time    WBC 4.0 06/03/2021 10:04 AM    HGB 12.8 (L) 06/03/2021 10:04 AM    HCT 40.1 06/03/2021 10:04 AM    PLATELET 713 01/98/5928 10:04 AM    MCV 74 (L) 06/03/2021 10:04 AM    ABS. NEUTROPHILS 1.8 06/03/2021 10:04 AM     Lab Results   Component Value Date/Time    Sodium 142 06/03/2021 10:04 AM    Potassium 4.6 06/03/2021 10:04 AM    Chloride 102 06/03/2021 10:04 AM    CO2 28 06/03/2021 10:04 AM    Glucose 91 06/03/2021 10:04 AM    BUN 17 06/03/2021 10:04 AM    Creatinine 0.96 06/03/2021 10:04 AM    GFR est AA 96 06/03/2021 10:04 AM    GFR est non-AA 83 06/03/2021 10:04 AM    Calcium 9.9 06/03/2021 10:04 AM     Lab Results   Component Value Date/Time    Bilirubin, total 0.3 06/03/2021 10:04 AM    ALT (SGPT) 15 06/03/2021 10:04 AM    Alk. phosphatase 83 06/03/2021 10:04 AM    Protein, total 7.1 06/03/2021 10:04 AM    Albumin 4.6 06/03/2021 10:04 AM    Globulin 2.9 01/08/2020 10:26 AM     Lab Results   Component Value Date/Time    Iron % saturation 22 04/22/2020 06:10 AM    TIBC 420 04/22/2020 06:10 AM    Ferritin 5 (L) 04/22/2020 06:10 AM    TSH 2.730 06/03/2021 10:04 AM     Lab Results   Component Value Date/Time    D-dimer 0.29 04/21/2020 06:55 PM       Records reviewed and summarized above. Pathology report(s) reviewed above. Radiology report(s) reviewed above. CT neck 12/3/19: IMPRESSION:   1. Extensive right cervical lymphadenopathy as previously described, with a  very slight interval decrease in size but continued heterogeneous enhancement  consistent with necrosis. There is no abscess formation or liquefaction,  however. 2. Stranding of surrounding fat which may be related to treatment and or venous  congestion.   3. Intraluminal thrombus in the right internal jugular vein, new since the prior  study    Assessment:   1) R tonsil SCC  Per discussion with Dr. Althea Little the tonsillar mass may be involving the pharyngeal wall  CT suggests multiple ipsilateral nodes , atleast one ~3  cm and none > 6 cm  PET CT shows no distant mets  HPV positive    Clinically at least T2N1- Though could be T3N1 - HPV postive- stage I-II    Completed 2 cycles of Cisplatin (pt refused 3rd cycle) and completed RT on 12/26/19. PET 12 weeks later showed a CR and he is on surveillance    He had a normal exam with ENT in Nov 2020 with some residual swelling. He has not had a follow up yet this year. He will get this scheduled    Doing well with no recurrence     2) Iron Deficiency Anemia  S/p 1 unit PRBCs  GIB on Xarelto  Off AC, s/p Injecatfer 5/12/2020 and Hb is continuing to  improve  Colonoscopy EGD with Dr. Nader Sims- has not done, gave him number again to set up and urged him to get this done as he still has mild anemia    3) Dysphagia  Grade 1    4)  FH of Cancers  Should consider genetic counseling at a later date    Plan:     · RTC 6 months with cbc, cmp, TSH  · Follow with GI - call for appointment   · Follow with Dr. Althea Little      RTC in 6 months     I appreciate the opportunity to participate in Mr. Parish Sharpe care.     Signed By: Mikayla Azevedo MD

## 2021-06-08 NOTE — PROGRESS NOTES
Lise Barnhart is a 61 y.o. 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 Have you been to the ER, urgent care clinic since your last visit? Hospitalized since your last visit? No    2. Have you seen or consulted any other health care providers outside of the [address] [place] [address] since your last visit? Include any pap smears or colon screening.  No

## 2021-06-08 NOTE — PROGRESS NOTES
Referral faxed to MD [facility] per request of  at their office.  stated that they have to speak directly to the patient to schedule the appointment.

## 2021-07-22 RX ORDER — TADALAFIL 5 MG/1
5 TABLET ORAL AS NEEDED
Qty: 30 TABLET | Refills: 1 | Status: CANCELLED | OUTPATIENT
Start: 2021-07-22

## 2021-12-07 ENCOUNTER — TELEPHONE (OUTPATIENT)
Dept: ONCOLOGY | Age: 66
End: 2021-12-07

## 2021-12-09 LAB
BASOPHILS # BLD AUTO: 0 X10E3/UL (ref 0–0.2)
BASOPHILS NFR BLD AUTO: 0 %
EOSINOPHIL # BLD AUTO: 0.2 X10E3/UL (ref 0–0.4)
EOSINOPHIL NFR BLD AUTO: 6 %
ERYTHROCYTE [DISTWIDTH] IN BLOOD BY AUTOMATED COUNT: 15.8 % (ref 11.6–15.4)
HCT VFR BLD AUTO: 39.2 % (ref 37.5–51)
HGB BLD-MCNC: 12.2 G/DL (ref 13–17.7)
IMM GRANULOCYTES # BLD AUTO: 0 X10E3/UL (ref 0–0.1)
IMM GRANULOCYTES NFR BLD AUTO: 0 %
LYMPHOCYTES # BLD AUTO: 1.1 X10E3/UL (ref 0.7–3.1)
LYMPHOCYTES NFR BLD AUTO: 36 %
MCH RBC QN AUTO: 22.3 PG (ref 26.6–33)
MCHC RBC AUTO-ENTMCNC: 31.1 G/DL (ref 31.5–35.7)
MCV RBC AUTO: 72 FL (ref 79–97)
MONOCYTES # BLD AUTO: 0.4 X10E3/UL (ref 0.1–0.9)
MONOCYTES NFR BLD AUTO: 14 %
NEUTROPHILS # BLD AUTO: 1.4 X10E3/UL (ref 1.4–7)
NEUTROPHILS NFR BLD AUTO: 44 %
PLATELET # BLD AUTO: 274 X10E3/UL (ref 150–450)
RBC # BLD AUTO: 5.47 X10E6/UL (ref 4.14–5.8)
TSH SERPL DL<=0.005 MIU/L-ACNC: 1.47 UIU/ML (ref 0.45–4.5)
WBC # BLD AUTO: 3 X10E3/UL (ref 3.4–10.8)

## 2021-12-20 ENCOUNTER — TELEPHONE (OUTPATIENT)
Dept: ONCOLOGY | Age: 66
End: 2021-12-20

## 2021-12-20 NOTE — TELEPHONE ENCOUNTER
Patient stated that he is feeling sick and is having issues in his nose and neck. He has an appointment on the 1/13/22 for a follow up. He said he has gotten his blood work done and wanted to know what he should do and wants to be seen sooner.  Please advise     CB: [phone]

## 2021-12-21 NOTE — TELEPHONE ENCOUNTER
12/21/21 at 4:28 pm - Called the patient and left a message to call this office back at his earliest convenience before 5:00 pm today 12/21/21 or anytime between 8:00 am and 5:00 pm tomorrow 12/22/21.

## 2022-01-13 ENCOUNTER — OFFICE VISIT (OUTPATIENT)
Dept: ONCOLOGY | Age: 67
End: 2022-01-13
Payer: COMMERCIAL

## 2022-01-13 VITALS
DIASTOLIC BLOOD PRESSURE: 98 MMHG | BODY MASS INDEX: 20.23 KG/M2 | TEMPERATURE: 98.2 F | WEIGHT: 137 LBS | SYSTOLIC BLOOD PRESSURE: 148 MMHG | RESPIRATION RATE: 18 BRPM | OXYGEN SATURATION: 98 %

## 2022-01-13 DIAGNOSIS — C09.9 TONSIL CANCER (HCC): ICD-10-CM

## 2022-01-13 PROCEDURE — 99213 OFFICE O/P EST LOW 20 MIN: CPT | Performed by: INTERNAL MEDICINE

## 2022-01-13 NOTE — PROGRESS NOTES
[facility] at [address], [id number] [place] [address] Road, [address] [place] [address] Parul Conrad Come: [phone]  F: [phone]    Reason for Visit:   Nena Hollins is a 61 y.o. 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 Size is measuring up to 2.5 cm in short axis. Right internal jugular vein is nearly fully compressed, no contralateral adenopathy seen. · 2019-ENT exam showed very inflamed tonsil on the right side with right Epley and surface, reportedly invading the pharyngeal wall-biopsy of the right tonsil showed invasive nonkeratinizing squamous cell carcinoma moderately differentiated  · 2019: PET CT Right tonsillar mass with hypermetabolic right cervical lymphadenopathy  · 19: cycle 1 cisplatin + RT  · 19: radiation completed   · 3/2020: PET with CR  · 2020: CT JEREMI 4    History of Present Illness:   Patient is a 61 y.o. male seen for SCC of the R tonsil    3 months ago he noted a small R neck swelling. Saw Dr. Magnolia Abebe. Tried antibiotics for 10 days. Returned 19 and had an USG that showed adenopathy and then referred to Dr. Omar Garcia. This led to above mentioned diagnosis and treatments and he has now been on surveillance. He did have anemia of blood loss on Xarelto in 2020 when Xarelto was discontinued. He received Injectafer x 2. He continues surveillance. Has sialadenitis per ENT. Saw ENT this morning. Denies dysphagia. Eating and drinking well. Denies lumps or bumps. Has not scheduled his colonoscopy yet. No other complaints.      Sister had stage IV endometrial cancer in her 46s  Another sister  of ovarian cancer in her 62s     [address] [facility] and [facility] reviewed [facility]   Reviewed under HPI    [place]  Reviewed under HPI    Current Outpatient Medications Medication Sig    levothyroxine (SYNTHROID) 75 mcg tablet Take 1 tablet on an empty stomach before breakfast    diclofenac EC (VOLTAREN) 75 mg EC tablet Take 75 mg by mouth two (2) times a day.  tadalafiL (Cialis) 5 mg tablet Take 1 Tab by mouth as needed for Erectile Dysfunction.  omeprazole (PRILOSEC) 20 mg capsule     HYDROcodone-acetaminophen (NORCO) 5-325 mg per tablet Take 1-2 Tabs by mouth every six (6) hours as needed.  nut tx, lact-reduced, iron (BOOST VHC) 0.09-2.25 gram-kcal/mL liqd 6 Cans by Per G Tube route daily. (Patient taking differently: 6 Cans by Per G Tube route daily. ENSURE now)    hydroCHLOROthiazide (HYDRODIURIL) 12.5 mg tablet Take 12.5 mg by mouth daily.  atorvastatin (LIPITOR) 20 mg tablet Take  by mouth daily.  multivitamin (ONE A DAY) tablet Take 1 Tab by mouth daily.  magic mouthwash solution Magic mouth wash Maalox Lidocaine 2% viscous Diphenhydramine oral solution Pharmacy to mix equal portions of ingredients to a total volume as indicated in the dispense amount. Swish & swallow 4 times daily as needed for mouth sores    methocarbamoL (ROBAXIN) 750 mg tablet Take 750 mg by mouth every eight (8) hours as needed.  methylPREDNISolone (MEDROL) 8 mg tablet Take 5 Tabs by mouth See Admin Instructions. Take 5 tabs by mouth (40mg) on days 2&3 of chemotherapy     No current facility-administered medications for this visit. No Known Allergies     Review of Systems: A complete review of systems was obtained, negative except as described above.     Physical Exam:     Vitals reviewed     Constitutional: Appears well-developed and well-nourished in no apparent distress   Mental status: Alert and awake, Oriented to person/place/time, Able to follow commands, Voice is hoarse  Eyes: EOM normal, Sclera normal, No visible ocular discharge  HENT: Normocephalic, atraumatic; Mouth/Throat: Moist mucous membranes, External Ears normal, no adenopathy palpated   Neck: No visualized mass  Pulmonary/Chest: Respiratory effort normal, No visualized signs of difficulty breathing or respiratory distress   Psychiatric: Normal affect, normal judgment/insight. No hallucinations       Results:     Lab Results   Component Value Date/Time    WBC 3.0 (L) 12/08/2021 01:32 PM    HGB 12.2 (L) 12/08/2021 01:32 PM    HCT 39.2 12/08/2021 01:32 PM    PLATELET 781 60/84/5262 01:32 PM    MCV 72 (L) 12/08/2021 01:32 PM    ABS. NEUTROPHILS 1.4 12/08/2021 01:32 PM     Lab Results   Component Value Date/Time    Sodium 142 06/03/2021 10:04 AM    Potassium 4.6 06/03/2021 10:04 AM    Chloride 102 06/03/2021 10:04 AM    CO2 28 06/03/2021 10:04 AM    Glucose 91 06/03/2021 10:04 AM    BUN 17 06/03/2021 10:04 AM    Creatinine 0.96 06/03/2021 10:04 AM    GFR est AA 96 06/03/2021 10:04 AM    GFR est non-AA 83 06/03/2021 10:04 AM    Calcium 9.9 06/03/2021 10:04 AM     Lab Results   Component Value Date/Time    Bilirubin, total 0.3 06/03/2021 10:04 AM    ALT (SGPT) 15 06/03/2021 10:04 AM    Alk. phosphatase 83 06/03/2021 10:04 AM    Protein, total 7.1 06/03/2021 10:04 AM    Albumin 4.6 06/03/2021 10:04 AM    Globulin 2.9 01/08/2020 10:26 AM     Lab Results   Component Value Date/Time    Iron % saturation 22 04/22/2020 06:10 AM    TIBC 420 04/22/2020 06:10 AM    Ferritin 5 (L) 04/22/2020 06:10 AM    TSH 1.470 12/08/2021 01:32 PM     Lab Results   Component Value Date/Time    D-dimer 0.29 04/21/2020 06:55 PM       Records reviewed and summarized above. Pathology report(s) reviewed above. Radiology report(s) reviewed above. CT neck 12/3/19: IMPRESSION:   1. Extensive right cervical lymphadenopathy as previously described, with a  very slight interval decrease in size but continued heterogeneous enhancement  consistent with necrosis. There is no abscess formation or liquefaction,  however. 2. Stranding of surrounding fat which may be related to treatment and or venous  congestion.   3. Intraluminal thrombus in the right internal jugular vein, new since the prior  study    Assessment:   1) R tonsil SCC  Per discussion with Dr. Jerry Carlton the tonsillar mass may be involving the pharyngeal wall  CT suggests multiple ipsilateral nodes , atleast one ~3  cm and none > 6 cm  PET CT shows no distant mets  HPV positive    Clinically at least T2N1- Though could be T3N1 - HPV postive- stage I-II    Completed 2 cycles of Cisplatin (pt refused 3rd cycle) and completed RT on 12/26/19. PET 12 weeks later showed a CR and he is on surveillance    He had a normal exam with ENT 1/2022. Doing well with no recurrence     2) Iron Deficiency Anemia  S/p 1 unit PRBCs  GIB on Xarelto  Off AC, s/p Injecatfer 5/12/2020 and Hb is continuing to  improve  Colonoscopy EGD with Dr. Kate Vega- has not done, gave him number again to set up and urged him to get this done as he still has mild anemia    3) Dysphagia  Grade 1    4)  FH of Cancers  Should consider genetic counseling at a later date    Plan:     · RTC 6 months with cbc, cmp, TSH  · Follow with GI - call for appointment   · Follow with Dr. Jerry Carlton    RTC in 6 months     I appreciate the opportunity to participate in Mr. Rolando Webb care. I personally saw and evaluated the patient and performed the key components of medical decision making. The history, physical exam, and documentation were performed by Sachin Valencia NP. I reviewed and verified the above documentation and modified it as needed.       Isabelle Flores has no evidence of recurrence  No adenopathy  Stable anemia  Continue surveillance    Signed By: Donald Pack MD

## 2022-01-13 NOTE — PROGRESS NOTES
Chris Baer is a 61 y.o. 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 Have you been to the ER, urgent care clinic since your last visit? Hospitalized since your last visit? No    2. Have you seen or consulted any other health care providers outside of the [address] [place] [address] since your last visit? Include any pap smears or colon screening. Yes [facility] ENT

## 2022-01-19 ENCOUNTER — PATIENT MESSAGE (OUTPATIENT)
Dept: ENDOCRINOLOGY | Age: 67
End: 2022-01-19

## 2022-01-19 ENCOUNTER — TELEPHONE (OUTPATIENT)
Dept: ENDOCRINOLOGY | Age: 67
End: 2022-01-19

## 2022-01-19 DIAGNOSIS — E03.9 ACQUIRED HYPOTHYROIDISM: Primary | ICD-10-CM

## 2022-01-19 RX ORDER — LEVOTHYROXINE SODIUM 75 UG/1
TABLET ORAL
Qty: 90 TABLET | Refills: 0 | Status: SHIPPED | OUTPATIENT
Start: 2022-01-19

## 2022-01-19 NOTE — TELEPHONE ENCOUNTER
Please call patient to schedule an office visit as soon as possible. He has been advised to expect a call to schedule. Thank you.

## 2022-03-18 PROBLEM — C76.0 HEAD AND NECK CANCER (HCC): Status: ACTIVE | Noted: 2020-05-28

## 2022-03-19 PROBLEM — D50.9 IRON DEFICIENCY ANEMIA: Status: ACTIVE | Noted: 2020-04-30

## 2022-03-19 PROBLEM — K92.2 GI BLEED: Status: ACTIVE | Noted: 2020-04-21

## 2022-03-19 PROBLEM — F17.200 SMOKING: Status: ACTIVE | Noted: 2019-09-25

## 2022-03-20 PROBLEM — C09.9 TONSIL CANCER (HCC): Status: ACTIVE | Noted: 2019-09-25

## 2022-07-11 ENCOUNTER — TELEPHONE (OUTPATIENT)
Dept: ONCOLOGY | Age: 67
End: 2022-07-11

## 2022-07-11 NOTE — TELEPHONE ENCOUNTER
Patient called to reschedule upcoming OV (587/18) due to conflict in schedule.   Patient has confirmed 8/17/22 @ 930am

## 2022-08-16 ENCOUNTER — TELEPHONE (OUTPATIENT)
Dept: ONCOLOGY | Age: 67
End: 2022-08-16

## 2022-09-22 ENCOUNTER — ANESTHESIA (OUTPATIENT)
Dept: ENDOSCOPY | Age: 67
End: 2022-09-22
Payer: MEDICARE

## 2022-09-22 ENCOUNTER — HOSPITAL ENCOUNTER (OUTPATIENT)
Age: 67
Setting detail: OUTPATIENT SURGERY
Discharge: HOME | End: 2022-09-22
Attending: SPECIALIST | Admitting: SPECIALIST
Payer: MEDICARE

## 2022-09-22 ENCOUNTER — ANESTHESIA EVENT (OUTPATIENT)
Dept: ENDOSCOPY | Age: 67
End: 2022-09-22
Payer: MEDICARE

## 2022-09-22 VITALS
TEMPERATURE: 98.4 F | OXYGEN SATURATION: 100 % | HEIGHT: 69 IN | BODY MASS INDEX: 19.77 KG/M2 | SYSTOLIC BLOOD PRESSURE: 145 MMHG | WEIGHT: 133.5 LBS | DIASTOLIC BLOOD PRESSURE: 99 MMHG | HEART RATE: 98 BPM | RESPIRATION RATE: 15 BRPM

## 2022-09-22 PROCEDURE — 74011250636 HC RX REV CODE- 250/636: Performed by: NURSE ANESTHETIST, CERTIFIED REGISTERED

## 2022-09-22 PROCEDURE — 76060000031 HC ANESTHESIA FIRST 0.5 HR: Performed by: SPECIALIST

## 2022-09-22 PROCEDURE — 2709999900 HC NON-CHARGEABLE SUPPLY: Performed by: SPECIALIST

## 2022-09-22 PROCEDURE — 77030039825 HC MSK NSL PAP SUPERNO2VA VYRM -B: Performed by: ANESTHESIOLOGY

## 2022-09-22 PROCEDURE — 76040000019: Performed by: SPECIALIST

## 2022-09-22 PROCEDURE — 77030021593 HC FCPS BIOP ENDOSC BSC -A: Performed by: SPECIALIST

## 2022-09-22 PROCEDURE — 77030020268 HC MISC GENERAL SUPPLY: Performed by: SPECIALIST

## 2022-09-22 PROCEDURE — 88305 TISSUE EXAM BY PATHOLOGIST: CPT

## 2022-09-22 PROCEDURE — 74011000250 HC RX REV CODE- 250: Performed by: NURSE ANESTHETIST, CERTIFIED REGISTERED

## 2022-09-22 RX ORDER — SODIUM CHLORIDE 0.9 % (FLUSH) 0.9 %
5-40 SYRINGE (ML) INJECTION AS NEEDED
Status: DISCONTINUED | OUTPATIENT
Start: 2022-09-22 | End: 2022-09-22 | Stop reason: HOSPADM

## 2022-09-22 RX ORDER — LIDOCAINE HYDROCHLORIDE 20 MG/ML
INJECTION, SOLUTION EPIDURAL; INFILTRATION; INTRACAUDAL; PERINEURAL AS NEEDED
Status: DISCONTINUED | OUTPATIENT
Start: 2022-09-22 | End: 2022-09-22 | Stop reason: HOSPADM

## 2022-09-22 RX ORDER — DEXTROMETHORPHAN/PSEUDOEPHED 2.5-7.5/.8
1.2 DROPS ORAL
Status: DISCONTINUED | OUTPATIENT
Start: 2022-09-22 | End: 2022-09-22 | Stop reason: HOSPADM

## 2022-09-22 RX ORDER — PROPOFOL 10 MG/ML
INJECTION, EMULSION INTRAVENOUS AS NEEDED
Status: DISCONTINUED | OUTPATIENT
Start: 2022-09-22 | End: 2022-09-22 | Stop reason: HOSPADM

## 2022-09-22 RX ORDER — SODIUM CHLORIDE 9 MG/ML
50 INJECTION, SOLUTION INTRAVENOUS CONTINUOUS
Status: DISCONTINUED | OUTPATIENT
Start: 2022-09-22 | End: 2022-09-22 | Stop reason: HOSPADM

## 2022-09-22 RX ORDER — SODIUM CHLORIDE 0.9 % (FLUSH) 0.9 %
5-40 SYRINGE (ML) INJECTION EVERY 8 HOURS
Status: DISCONTINUED | OUTPATIENT
Start: 2022-09-22 | End: 2022-09-22 | Stop reason: HOSPADM

## 2022-09-22 RX ORDER — SODIUM CHLORIDE 9 MG/ML
INJECTION, SOLUTION INTRAVENOUS
Status: DISCONTINUED | OUTPATIENT
Start: 2022-09-22 | End: 2022-09-22 | Stop reason: HOSPADM

## 2022-09-22 RX ORDER — ATROPINE SULFATE 0.1 MG/ML
0.5 INJECTION INTRAVENOUS
Status: DISCONTINUED | OUTPATIENT
Start: 2022-09-22 | End: 2022-09-22 | Stop reason: HOSPADM

## 2022-09-22 RX ORDER — AMOXICILLIN 125 MG/5ML
POWDER, FOR SUSPENSION ORAL 2 TIMES DAILY
COMMUNITY

## 2022-09-22 RX ORDER — EPINEPHRINE 0.1 MG/ML
1 INJECTION INTRACARDIAC; INTRAVENOUS
Status: DISCONTINUED | OUTPATIENT
Start: 2022-09-22 | End: 2022-09-22 | Stop reason: HOSPADM

## 2022-09-22 RX ORDER — FLUMAZENIL 0.1 MG/ML
0.2 INJECTION INTRAVENOUS
Status: DISCONTINUED | OUTPATIENT
Start: 2022-09-22 | End: 2022-09-22 | Stop reason: HOSPADM

## 2022-09-22 RX ORDER — NALOXONE HYDROCHLORIDE 0.4 MG/ML
0.4 INJECTION, SOLUTION INTRAMUSCULAR; INTRAVENOUS; SUBCUTANEOUS
Status: DISCONTINUED | OUTPATIENT
Start: 2022-09-22 | End: 2022-09-22 | Stop reason: HOSPADM

## 2022-09-22 RX ADMIN — PROPOFOL 50 MG: 10 INJECTION, EMULSION INTRAVENOUS at 15:55

## 2022-09-22 RX ADMIN — LIDOCAINE HYDROCHLORIDE 100 MG: 20 INJECTION, SOLUTION EPIDURAL; INFILTRATION; INTRACAUDAL; PERINEURAL at 15:55

## 2022-09-22 RX ADMIN — SODIUM CHLORIDE: 900 INJECTION, SOLUTION INTRAVENOUS at 15:48

## 2022-09-22 RX ADMIN — PROPOFOL 25 MG: 10 INJECTION, EMULSION INTRAVENOUS at 15:56

## 2022-09-22 RX ADMIN — PROPOFOL 25 MG: 10 INJECTION, EMULSION INTRAVENOUS at 15:57

## 2022-09-22 RX ADMIN — PROPOFOL 25 MG: 10 INJECTION, EMULSION INTRAVENOUS at 15:59

## 2022-09-22 RX ADMIN — PROPOFOL 50 MG: 10 INJECTION, EMULSION INTRAVENOUS at 16:00

## 2022-09-22 NOTE — PROGRESS NOTES
Nena Mcmanus  1955  [id number]    Situation:  Verbal report received from: Jackline Fleischer RN  Procedure: Procedure(s):  ESOPHAGOGASTRODUODENOSCOPY (EGD)  ESOPHAGOGASTRODUODENAL (EGD) BIOPSY  ESOPHAGEAL DILATION    Background:    Preoperative diagnosis: DYSPHAGIA, IRON DEFICIENCY ANEMIA, PRIMARY MALIGNANT NEOPLASM OF TONSIL  Postoperative diagnosis: 1. - Dysphagia  2. - Rule Out Candida    :  Dr. Jesusita Sepulveda  Assistant(s): Endoscopy Technician-1: Nancy Barrera  Endoscopy RN-1: Kalpana Ramachandran RN    Specimens:   ID Type Source Tests Collected by Time Destination   1 : Esophageal Biopsies Preservative Jimbo Morales MD 9/22/2022 1558 Pathology     H. Pylori  no    Assessment:  Intra-procedure medications   Anesthesia gave intra-procedure sedation and medications, see anesthesia flow sheet     Intravenous fluids: NS@ KVO     Vital signs stable     Abdominal assessment: round and soft    Recommendation:  Discharge patient per MD order.   Family or Friend - wife  Permission to share finding with family or friend yes

## 2022-09-22 NOTE — DISCHARGE INSTRUCTIONS
Dominga Dey  [id number]  1955    EGD DISCHARGE INSTRUCTIONS  Discomfort:  Sore throat- throat lozenges or warm salt water gargle  redness at IV site- apply warm compress to area; if redness or soreness persist- contact your physician  Gaseous discomfort- walking, belching will help relieve any discomfort    DIET  You may resume your regular diet - however -  remember your colon is empty and a heavy meal will produce gas. Avoid these foods:  vegetables, fried / greasy foods, carbonated drinks  You may not drink alcoholic beverages for at least 12 hours    MEDICATIONS   Regarding Aspirin or Nonsteroidal medications specifically, please see below. ACTIVITY  You may resume your normal daily activities. Spend the remainder of the day resting -  avoid any strenuous activity. You may not operate a vehicle for 12 hours  You may not engage in an occupation involving machinery or appliances for rest of today. Avoid making any critical decisions for at least 24 hour    CALL M.D. ANY SIGN OF   Increasing pain, nausea, vomiting  Abdominal distension (swelling)  New increased bleeding (oral or rectal)  Fever (chills)  Pain in chest area  Bloody discharge from nose or mouth  Shortness of breath    You may not  take any Advil, Aspirin, Ibuprofen, Motrin, Aleve, or Goodys for 10 days, ONLY  Tylenol as needed for pain.     Post procedure diagnosis: 1. - Dysphagia  2. - Rule Out Candida      Follow-up Instructions:   Call Dr. Bj Cohn  Results of procedure / biopsy in 10 days, take Diflucan as prescribed  Telephone #  [phone]        DISCHARGE SUMMARY from Nurse    The following personal items collected during your admission are returned to you:   Dental Appliance: Dental Appliances: None  Vision: Visual Aid: None  Hearing Aid:    Jewelry:    Clothing:    Other Valuables:    Valuables sent to safe:

## 2022-09-22 NOTE — ANESTHESIA PREPROCEDURE EVALUATION
Relevant Problems   RESPIRATORY SYSTEM   (+) Smoking      CARDIOVASCULAR   (+) HTN (hypertension)      HEMATOLOGY   (+) Iron deficiency anemia   (+) Tonsil cancer (HCC)      PERSONAL HX & FAMILY HX OF CANCER   (+) Head and neck cancer (HCC)   (+) Tonsil cancer (HCC)       Anesthetic History   No history of anesthetic complications            Review of Systems / Medical History  Patient summary reviewed, nursing notes reviewed and pertinent labs reviewed    Pulmonary  Within defined limits                 Neuro/Psych   Within defined limits           Cardiovascular  Within defined limits  Hypertension                   GI/Hepatic/Renal  Within defined limits         PUD     Endo/Other  Within defined limits      Cancer     Other Findings              Physical Exam    Airway  Mallampati: II  TM Distance: > 6 cm  Neck ROM: normal range of motion   Mouth opening: Normal     Cardiovascular  Regular rate and rhythm,  S1 and S2 normal,  no murmur, click, rub, or gallop             Dental  No notable dental hx       Pulmonary  Breath sounds clear to auscultation               Abdominal  GI exam deferred       Other Findings            Anesthetic Plan    ASA: 2  Anesthesia type: MAC          Induction: Intravenous  Anesthetic plan and risks discussed with: Patient

## 2022-09-22 NOTE — PROCEDURES
[address] [place]                 NAME:  Luis Hazel   :   1955   MRN:   [id number]     Date/Time:  2022 4:07 PM    Esophagogastroduodenoscopy (EGD) Procedure Note    :  Meagan Hawkins MD    Staff: Endoscopy Technician-1: Falguni Leon  Endoscopy RN-1: Stephan Swift RN     Referring Provider:  Nicolás Li MD    Anethesia/Sedation:  MAC anesthesia Propofol    Preoperative diagnosis: DYSPHAGIA, IRON DEFICIENCY ANEMIA, PRIMARY MALIGNANT NEOPLASM OF TONSIL    Postoperative diagnosis: 1. - Dysphagia  2. - Rule Out Candida    Procedure Details     After infom consent was obtained for the procedure, with all risks and benefits of procedure explained the patient was taken to the endoscopy suite and placed in the left lateral decubitus position. Following sequential administration of sedation as per above, the [id number] gastroscope was inserted into the mouth and advanced under direct vision to second portion of the duodenum. A careful inspection was made as the gastroscope was withdrawn, including a retroflexed view of the proximal stomach; findings and interventions are described below. Findings:  Esophagus:White plaques seen in proximal and mid esophagus consistent with candida, biopsies done. Esophagus dilated with 46 F wire guided Savary dilator  Stomach:normal   Duodenum/jejunum:normal      Therapies:  as above    Specimens: esophageal bx           EBL: None    Complications:   None; patient tolerated the procedure well.            Impression:    See Postoperative diagnosis above    Recommendations:  -Start Diflucan, continue current meds    Discharge disposition:  Home in the company of  when able to ambulate    Meagan Hawkins MD

## 2022-09-22 NOTE — H&P
Pre-endoscopy H and P     The patient was seen and examined in the endoscopy suite. The airway was assessed and docuemented. The problem list and medications were reviewed. 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     Social History     Socioeconomic History    Marital status:      Spouse name: Not on file    Number of children: Not on file    Years of education: Not on file    Highest education level: Not on file   Occupational History    Not on file   Tobacco Use    Smoking status: Former    Smokeless tobacco: Never   Substance and Sexual Activity    Alcohol use: Yes     Alcohol/week: 14.0 standard drinks     Types: 14 Cans of beer per week     Comment: nightly     Drug use: Never    Sexual activity: Not on file   Other Topics Concern    Not on file   Social History Narrative    Not on file     Social Determinants of Health     Financial Resource Strain: Not on file   Food Insecurity: Not on file   Transportation Needs: Not on file   Physical Activity: Not on file   Stress: Not on file   Social Connections: Not on file   Intimate Partner Violence: Not on file   Housing Stability: Not on file     Past Medical History:   Diagnosis Date    Cancer ([facility]     Throat    HPV in male     HTN (hypertension)          Prior to Admission Medications   Prescriptions Last Dose Informant Patient Reported? Taking?   amoxicillin (AMOXIL) 125 mg/5 mL suspension 9/21/2022  Yes Yes   Sig: Take  by mouth two (2) times a day. Not sure of dose   atorvastatin (LIPITOR) 20 mg tablet 9/21/2022  Yes Yes   Sig: Take  by mouth daily. diclofenac EC (VOLTAREN) 75 mg EC tablet 9/21/2022  Yes Yes   Sig: Take 75 mg by mouth two (2) times a day. hydroCHLOROthiazide (HYDRODIURIL) 12.5 mg tablet 9/21/2022  Yes Yes   Sig: Take 12.5 mg by mouth daily. levothyroxine (SYNTHROID) 75 mcg tablet 2022  No Yes   Sig: Take 1 tablet on an empty stomach before breakfast. Must make follow up appointment for further refills. multivitamin (ONE A DAY) tablet   Yes No   Sig: Take 1 Tab by mouth daily. nut tx, lact-reduced, iron (BOOST VHC) 0.09-2.25 gram-kcal/mL liqd 9/15/2022  No Yes   Si Cans by Per G Tube route daily. Patient taking differently: 6 Cans by Per G Tube route daily. ENSURE now   omeprazole (PRILOSEC) 20 mg capsule 2022  Yes Yes   tadalafiL (Cialis) 5 mg tablet 9/15/2022  No Yes   Sig: Take 1 Tab by mouth as needed for Erectile Dysfunction. Facility-Administered Medications: None       Chief complaint, history of present illness, and review of systems and Past medical History are positive for: dysphagia and tonsillar cancer    The heart, lungs and mental status were satisfactory for the administration of sedation and for the procedure. I discussed with the patient the objectives, risks, consequences and alternatives to the procedure. The patient was counseled at length about the risks of keara Covid-19 in the rosi-operative and post-operative states including the recovery window of their procedure. The patient was made aware that keara Covid-19 after a surgical procedure may worsen their prognosis for recovering from the virus and lend to a higher morbidity and or mortality risk. The patient was given the options of postponing their procedure. All of the risks, benefits, and alternatives were discussed. The patient does  wish to proceed with the procedure.     Plan: Endoscopic procedure with sedation     Joss Rodriguez MD   2022  3:42 PM

## 2022-09-22 NOTE — PROGRESS NOTES

## 2022-09-27 NOTE — ANESTHESIA POSTPROCEDURE EVALUATION
Procedure(s):  ESOPHAGOGASTRODUODENOSCOPY (EGD)  ESOPHAGOGASTRODUODENAL (EGD) BIOPSY  ESOPHAGEAL DILATION. MAC    Anesthesia Post Evaluation        Patient location during evaluation: PACU  Note status: Adequate. Level of consciousness: responsive to verbal stimuli and sleepy but conscious  Pain management: satisfactory to patient  Airway patency: patent  Anesthetic complications: no  Cardiovascular status: acceptable  Respiratory status: acceptable  Hydration status: acceptable  Comments: +Post-Anesthesia Evaluation and Assessment    Patient: Miranda Durand MRN: [id number]  SSN: [id number]   YOB: 1955  Age: 74 y.o. Sex: male          Cardiovascular Function/Vital Signs    BP (!) 145/99   Pulse 98   Temp 36.9 °C (98.4 °F)   Resp 15   Ht 5' 8.5\" (1.74 m)   Wt 60.6 kg (133 lb 8 oz)   SpO2 100%   BMI 20.00 kg/m²     Patient is status post Procedure(s):  ESOPHAGOGASTRODUODENOSCOPY (EGD)  ESOPHAGOGASTRODUODENAL (EGD) BIOPSY  ESOPHAGEAL DILATION. Nausea/Vomiting: Controlled. Postoperative hydration reviewed and adequate. Pain:  Pain Scale 1: Numeric (0 - 10) (09/22/22 1538)  Pain Intensity 1: 0 (09/22/22 1538)   Managed. Neurological Status: At baseline. Mental Status and Level of Consciousness: Arousable. Pulmonary Status:   O2 Device: Nasal mask (09/22/22 1603)   Adequate oxygenation and airway patent. Complications related to anesthesia: None    Post-anesthesia assessment completed. No concerns. I have evaluated the patient and the patient is stable and ready to be discharged from PACU .     Signed By: Ike Plasencia MD    9/27/2022        INITIAL Post-op Vital signs:   Vitals Value Taken Time   /99 09/22/22 1620   Temp     Pulse 98 09/22/22 1620   Resp 15 09/22/22 1620   SpO2 100 % 09/22/22 1620

## 2022-10-06 ENCOUNTER — VIRTUAL VISIT (OUTPATIENT)
Dept: ONCOLOGY | Age: 67
End: 2022-10-06
Payer: MEDICARE

## 2022-10-06 DIAGNOSIS — C09.9 TONSIL CANCER (HCC): Primary | ICD-10-CM

## 2022-10-06 PROCEDURE — G8427 DOCREV CUR MEDS BY ELIG CLIN: HCPCS | Performed by: INTERNAL MEDICINE

## 2022-10-06 PROCEDURE — 1101F PT FALLS ASSESS-DOCD LE1/YR: CPT | Performed by: INTERNAL MEDICINE

## 2022-10-06 PROCEDURE — 3017F COLORECTAL CA SCREEN DOC REV: CPT | Performed by: INTERNAL MEDICINE

## 2022-10-06 PROCEDURE — 1123F ACP DISCUSS/DSCN MKR DOCD: CPT | Performed by: INTERNAL MEDICINE

## 2022-10-06 PROCEDURE — G8432 DEP SCR NOT DOC, RNG: HCPCS | Performed by: INTERNAL MEDICINE

## 2022-10-06 PROCEDURE — 99213 OFFICE O/P EST LOW 20 MIN: CPT | Performed by: INTERNAL MEDICINE

## 2022-10-06 PROCEDURE — G8756 NO BP MEASURE DOC: HCPCS | Performed by: INTERNAL MEDICINE

## 2022-10-06 NOTE — PROGRESS NOTES
[facility] [place] [address] Belia Mocent, [address] [address], [address] [place] [address] Parul Barcenas Bunk: [phone]  F: [phone]    Reason for Visit:   Aj Oconnor is a 74 y.o. 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 male who is seen by synchronous (real-time) audio-video technology on 10/6/2022     Treatment History:   2019-CT of the neck enlarged cervical lymph nodes in multiple compartments from levels 1-5. Size is measuring up to 2.5 cm in short axis. Right internal jugular vein is nearly fully compressed, no contralateral adenopathy seen. 2019-ENT exam showed very inflamed tonsil on the right side with right Epley and surface, reportedly invading the pharyngeal wall-biopsy of the right tonsil showed invasive nonkeratinizing squamous cell carcinoma moderately differentiated  2019: PET CT Right tonsillar mass with hypermetabolic right cervical lymphadenopathy  19: cycle 1 cisplatin + RT  19: radiation completed   3/2020: PET with CR  2020: CT JEREMI 4    History of Present Illness:   Patient is a 74 y.o. male seen for SCC of the R tonsil    3 months ago he noted a small R neck swelling. Saw Dr. Katelynn Graves. Tried antibiotics for 10 days. Returned 19 and had an USG that showed adenopathy and then referred to Dr. Darline Steiner. This led to above mentioned diagnosis and treatments and he has now been on surveillance. He did have anemia of blood loss on Xarelto in 2020 when Xarelto was discontinued. He received Injectafer x 2. He continues surveillance. He has been treated for candida esophagitis. He has better swallowing now. Saw Dr. Darline Steiner in Sep 2022.  JEREMI      Sister had stage IV endometrial cancer in her 46s  Another sister  of ovarian cancer in her 62s     PMH and PSH reviewed abov    [address]   Reviewed under HPI    [place]  Reviewed under HPI    Current Outpatient Medications   Medication Sig    amoxicillin (AMOXIL) 125 mg/5 mL suspension Take  by mouth two (2) times a day. Not sure of dose    levothyroxine (SYNTHROID) 75 mcg tablet Take 1 tablet on an empty stomach before breakfast. Must make follow up appointment for further refills. diclofenac EC (VOLTAREN) 75 mg EC tablet Take 75 mg by mouth two (2) times a day. tadalafiL (Cialis) 5 mg tablet Take 1 Tab by mouth as needed for Erectile Dysfunction. omeprazole (PRILOSEC) 20 mg capsule     nut tx, lact-reduced, iron (BOOST VHC) 0.09-2.25 gram-kcal/mL liqd 6 Cans by Per G Tube route daily. (Patient taking differently: 6 Cans by Per G Tube route daily. ENSURE now)    hydroCHLOROthiazide (HYDRODIURIL) 12.5 mg tablet Take 12.5 mg by mouth daily. atorvastatin (LIPITOR) 20 mg tablet Take  by mouth daily. multivitamin (ONE A DAY) tablet Take 1 Tab by mouth daily. No current facility-administered medications for this visit. No Known Allergies     Review of Systems: A complete review of systems was obtained, negative except as described above. Physical Exam:     Vitals reviewed     Constitutional: Appears well-developed and well-nourished in no apparent distress   Mental status: Alert and awake, Oriented to person/place/time, Able to follow commands, Voice is hoarse  Eyes: EOM normal, Sclera normal, No visible ocular discharge  HENT: Normocephalic, atraumatic; Mouth/Throat: Moist mucous membranes, External Ears normal, no adenopathy palpated   Neck: No visualized mass  Pulmonary/Chest: Respiratory effort normal, No visualized signs of difficulty breathing or respiratory distress   Psychiatric: Normal affect, normal judgment/insight.  No hallucinations       Results:     Lab Results   Component Value Date/Time    WBC 3.0 (L) 12/08/2021 01:32 PM    HGB 12.2 (L) 12/08/2021 01:32 PM    HCT 39.2 12/08/2021 01:32 PM    PLATELET 660 58/91/9153 01:32 PM    MCV 72 (L) 12/08/2021 01:32 PM ABS. NEUTROPHILS 1.4 12/08/2021 01:32 PM     Lab Results   Component Value Date/Time    Sodium 142 06/03/2021 10:04 AM    Potassium 4.6 06/03/2021 10:04 AM    Chloride 102 06/03/2021 10:04 AM    CO2 28 06/03/2021 10:04 AM    Glucose 91 06/03/2021 10:04 AM    BUN 17 06/03/2021 10:04 AM    Creatinine 0.96 06/03/2021 10:04 AM    GFR est AA 96 06/03/2021 10:04 AM    GFR est non-AA 83 06/03/2021 10:04 AM    Calcium 9.9 06/03/2021 10:04 AM     Lab Results   Component Value Date/Time    Bilirubin, total 0.3 06/03/2021 10:04 AM    ALT (SGPT) 15 06/03/2021 10:04 AM    Alk. phosphatase 83 06/03/2021 10:04 AM    Protein, total 7.1 06/03/2021 10:04 AM    Albumin 4.6 06/03/2021 10:04 AM    Globulin 2.9 01/08/2020 10:26 AM     Lab Results   Component Value Date/Time    Iron % saturation 22 04/22/2020 06:10 AM    TIBC 420 04/22/2020 06:10 AM    Ferritin 5 (L) 04/22/2020 06:10 AM    TSH 1.470 12/08/2021 01:32 PM     Lab Results   Component Value Date/Time    D-dimer 0.29 04/21/2020 06:55 PM       Records reviewed and summarized above. Pathology report(s) reviewed above. Radiology report(s) reviewed above. CT neck 12/3/19: IMPRESSION:   1. Extensive right cervical lymphadenopathy as previously described, with a  very slight interval decrease in size but continued heterogeneous enhancement  consistent with necrosis. There is no abscess formation or liquefaction,  however. 2. Stranding of surrounding fat which may be related to treatment and or venous  congestion.   3. Intraluminal thrombus in the right internal jugular vein, new since the prior  study    Assessment:   1) R tonsil SCC  Per discussion with Dr. Johnna Selby the tonsillar mass may be involving the pharyngeal wall  CT suggests multiple ipsilateral nodes , atleast one ~3  cm and none > 6 cm  PET CT shows no distant mets  HPV positive    Clinically at least T2N1- Though could be T3N1 - HPV postive- stage I-II    Completed 2 cycles of Cisplatin (pt refused 3rd cycle) and completed RT on 12/26/19. PET 12 weeks later showed a CR and he is on surveillance    He had a normal exam with ENT 9/2022. Doing well with no recurrence     2) Iron Deficiency Anemia  S/p 1 unit PRBCs  GIB on Xarelto  Off AC, s/p Injecatfer 5/12/2020       3) Candida esophagitis  Treated    4)  FH of Cancers  Should consider genetic counseling - referred     Plan:     Referral for genetic counseling  Follow with Dr. Vivian Hennessy    No additional follow up needed with us    I appreciate the opportunity to participate in [facility] care. Signed By: Henrietta Plata MD      The patient was evaluated through a synchronous (real-time) audio-video encounter. The patient (or guardian if applicable) is aware that this is a billable service, which includes applicable co-pays. This Virtual Visit was conducted with patient's (and/or legal guardian's) consent. The visit was conducted pursuant to the emergency declaration under the [address] [place] [address] [facility] authority and the [employer] General Act. Patient identification was verified, and a caregiver was present when appropriate. The patient was located in a state where the provider was licensed to provide care.

## 2022-11-04 NOTE — PROGRESS NOTES
To auto via wheelchair with wife and brother. Abd (G-tube) DDI and right upper chest with dry and intact skin glue. 0 = swallows foods/liquids without difficulty

## 2025-04-21 NOTE — PROGRESS NOTES
Outpatient Infusion Center Progress Note    0817 Pt admit to [facility] for Hydration/labs ambulatory in stable condition. Assessment completed. No new concerns voiced. Port accessed without difficulty and hydration initiated. New orders received for lab work, which will be done at the conclusion of hydration today. Visit Vitals  BP 97/71 (BP 1 Location: Right arm, BP Patient Position: Sitting)   Pulse (!) 101   Temp 98.2 °F (36.8 °C)   Resp 18       Medications Administered     sodium chloride 0.9 % bolus infusion 1,000 mL     Admin Date  01/08/2020 Action  New Bag Dose  1000 mL Rate  1,000 mL/hr Route  IntraVENous Administered By  Sania Strong                1030 Pt tolerated treatment well. Port flushed, heparinized and deaccessed per protocol. See [facility] for lab results D/c home ambulatory in no distress.      Martina Clay RN no

## (undated) DEVICE — Device

## (undated) DEVICE — FORCEPS BX L240CM JAW DIA2.8MM L CAP W/ NDL MIC MESH TOOTH

## (undated) DEVICE — TUBING HYDR IRR --

## (undated) DEVICE — GUIDEWIRE